# Patient Record
Sex: FEMALE | Race: BLACK OR AFRICAN AMERICAN | Employment: UNEMPLOYED | URBAN - METROPOLITAN AREA
[De-identification: names, ages, dates, MRNs, and addresses within clinical notes are randomized per-mention and may not be internally consistent; named-entity substitution may affect disease eponyms.]

---

## 2017-04-10 ENCOUNTER — APPOINTMENT (OUTPATIENT)
Dept: GENERAL RADIOLOGY | Age: 57
End: 2017-04-10
Attending: PHYSICIAN ASSISTANT
Payer: COMMERCIAL

## 2017-04-10 ENCOUNTER — HOSPITAL ENCOUNTER (EMERGENCY)
Age: 57
Discharge: HOME | End: 2017-04-10
Attending: EMERGENCY MEDICINE
Payer: COMMERCIAL

## 2017-04-10 VITALS
TEMPERATURE: 97.9 F | HEIGHT: 66 IN | DIASTOLIC BLOOD PRESSURE: 81 MMHG | HEART RATE: 85 BPM | WEIGHT: 190 LBS | SYSTOLIC BLOOD PRESSURE: 135 MMHG | OXYGEN SATURATION: 100 % | RESPIRATION RATE: 16 BRPM | BODY MASS INDEX: 30.53 KG/M2

## 2017-04-10 DIAGNOSIS — T25.221A BURN, FOOT, SECOND DEGREE, RIGHT, INITIAL ENCOUNTER: Primary | ICD-10-CM

## 2017-04-10 DIAGNOSIS — M25.561 ACUTE PAIN OF RIGHT KNEE: ICD-10-CM

## 2017-04-10 PROCEDURE — 99282 EMERGENCY DEPT VISIT SF MDM: CPT

## 2017-04-10 PROCEDURE — 75810000057 HC BURN CR DRS/DEB SM<5%TBSA

## 2017-04-10 PROCEDURE — 73562 X-RAY EXAM OF KNEE 3: CPT

## 2017-04-10 RX ORDER — IBUPROFEN 200 MG
200 TABLET ORAL
COMMUNITY
End: 2018-06-07

## 2017-04-10 RX ORDER — TRAMADOL HYDROCHLORIDE 50 MG/1
50 TABLET ORAL
Qty: 20 TAB | Refills: 0 | Status: SHIPPED | OUTPATIENT
Start: 2017-04-10 | End: 2018-06-07

## 2017-04-10 NOTE — DISCHARGE INSTRUCTIONS
Clean wound daily with antibacterial soap and water. Apply xeroform over the open area only. (Can add additional neosporin under the xeroform if dressings are sticking to the wound). Cover with gauze. Change dressing at least 2 - 3 times daily. Return to ER if wound becomes red, hot, high fevers occur, or significant drainage. Follow up with your PCP or in the ER weekly until healed. Burns: Care Instructions  Your Care Instructions    Thomas--even minor ones--can be very painful. A minor burn may heal within several days, while a more serious burn may take weeks or even months to heal completely. You may notice that the burned area feels tight and hard while it is healing. It is important to continue to move the area as the burn heals to prevent loss of motion or loss of function in the area. When your skin is damaged by a burn, you have a greater risk of infection. Keep the wound clean and change the bandages regularly to prevent infection and help the burn heal.  Burns can leave permanent scars. Taking good care of the burn as it heals may help prevent bad scars. The doctor has checked you carefully, but problems can develop later. If you notice any problems or new symptoms, get medical treatment right away. Follow-up care is a key part of your treatment and safety. Be sure to make and go to all appointments, and call your doctor if you are having problems. It's also a good idea to know your test results and keep a list of the medicines you take. How can you care for yourself at home? · If your doctor told you how to care for your burn, follow your doctor's instructions. If you did not get instructions, follow this general advice:  ¨ Wash the burn with clean water 2 times a day. Don't use hydrogen peroxide or alcohol, which can slow healing. ¨ Gently pat the burn dry after you wash it.   ¨ You may cover the burn with a thin layer of petroleum jelly, such as Vaseline, and a nonstick bandage. ¨ Apply more petroleum jelly and replace the bandage as needed. · Protect your burn while it is healing. Cover your burn if you are going out in the cold or the sun. ¨ Wear long sleeves if the burn is on your hands or arms. ¨ Wear a hat if the burn is on your face. ¨ Wear socks and shoes if the burn is on your feet. · Do not break blisters open. This increases the chance of infection. If a blister breaks open by itself, blot up the liquid, and leave the skin that covered the blister. This helps protect the new skin. · If your doctor prescribed antibiotics, take them as directed. Do not stop taking them just because you feel better. You need to take the full course of antibiotics. For pain and itching  · Take pain medicines exactly as directed. ¨ If the doctor gave you a prescription medicine for pain, take it as prescribed. ¨ If you are not taking a prescription pain medicine, ask your doctor if you can take an over-the-counter medicine. · If the burn itches, try not to scratch it. Try an over-the-counter antihistamine such as diphenhydramine (Benadryl) or loratadine (Claritin). Read and follow all instructions on the label. When should you call for help? Call your doctor now or seek immediate medical care if:  · Your pain gets worse. · You have symptoms of infection, such as:  ¨ Increased pain, swelling, warmth, or redness near the burn. ¨ Red streaks leading from the burn. ¨ Pus draining from the burn. ¨ A fever. Watch closely for changes in your health, and be sure to contact your doctor if:  · You do not get better as expected. Where can you learn more? Go to http://anais-stef.info/. Enter F098 in the search box to learn more about \"Burns: Care Instructions. \"  Current as of: May 27, 2016  Content Version: 11.2  © 2578-5202 [employer].  Care instructions adapted under license by [employer] (which disclaims liability or warranty for this information). If you have questions about a medical condition or this instruction, always ask your healthcare professional. [address] any warranty or liability for your use of this information. Joint Pain: Care Instructions  Your Care Instructions  Many people have small aches and pains from overuse or injury to muscles and joints. Joint injuries often happen during sports or recreation, work tasks, or projects around the home. An overuse injury can happen when you put too much stress on a joint or when you do an activity that stresses the joint over and over, such as using the computer or rowing a boat. You can take action at home to help your muscles and joints get better. You should feel better in 1 to 2 weeks, but it can take 3 months or more to heal completely. Follow-up care is a key part of your treatment and safety. Be sure to make and go to all appointments, and call your doctor if you are having problems. It's also a good idea to know your test results and keep a list of the medicines you take. How can you care for yourself at home? · Do not put weight on the injured joint for at least a day or two. · For the first day or two after an injury, do not take hot showers or baths, and do not use hot packs. The heat could make swelling worse. · Put ice or a cold pack on the sore joint for 10 to 20 minutes at a time. Try to do this every 1 to 2 hours for the next 3 days (when you are awake) or until the swelling goes down. Put a thin cloth between the ice and your skin. · Wrap the injury in an elastic bandage. Do not wrap it too tightly because this can cause more swelling. · Prop up the sore joint on a pillow when you ice it or anytime you sit or lie down during the next 3 days. Try to keep it above the level of your heart. This will help reduce swelling.   · Take an over-the-counter pain medicine, such as acetaminophen (Tylenol), ibuprofen (Advil, Motrin), or naproxen (Surya). Read and follow all instructions on the label. · After 1 or 2 days of rest, begin moving the joint gently. While the joint is still healing, you can begin to exercise using activities that do not strain or hurt the painful joint. When should you call for help? Call your doctor now or seek immediate medical care if:  · You have signs of infection, such as:  ¨ Increased pain, swelling, warmth, and redness. ¨ Red streaks leading from the joint. ¨ A fever. Watch closely for changes in your health, and be sure to contact your doctor if:  · Your movement or symptoms are not getting better after 1 to 2 weeks of home treatment. Where can you learn more? Go to http://anais-stef.info/. Enter P205 in the search box to learn more about \"Joint Pain: Care Instructions. \"  Current as of: May 23, 2016  Content Version: 11.2  © 1873-4600 [employer]. Care instructions adapted under license by [employer] (which disclaims liability or warranty for this information). If you have questions about a medical condition or this instruction, always ask your healthcare professional. [place] [address] any warranty or liability for your use of this information.
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
## 2017-04-10 NOTE — LETTER
NOTIFICATION OF RETURN TO WORK / SCHOOL 
 
4/10/2017 Ms. Demetrio Falcon [address] [place] [address] To Whom it may concern, Please excuse Demetrio Falcon from work 04/10/17 for medical reasons. She may return to work as of 4/11/17 but will need to keep her foot elevated at regular intervals throughout the day to prevent swelling. [facility]

## 2017-04-10 NOTE — ED TRIAGE NOTES
Roast pan fell on foot last night. C/o of right foot pain. Was able to ambulate into triage area without difficulty.

## 2018-03-07 ENCOUNTER — HOSPITAL ENCOUNTER (OUTPATIENT)
Dept: LAB | Age: 58
Discharge: HOME | End: 2018-03-07

## 2018-03-07 LAB — SENTARA SPECIMEN COL,SENBCF: NORMAL

## 2018-03-07 PROCEDURE — 99001 SPECIMEN HANDLING PT-LAB: CPT | Performed by: INTERNAL MEDICINE

## 2018-03-14 ENCOUNTER — OFFICE VISIT (OUTPATIENT)
Dept: ORTHOPEDIC SURGERY | Age: 58
End: 2018-03-14

## 2018-03-14 VITALS
SYSTOLIC BLOOD PRESSURE: 135 MMHG | HEIGHT: 66 IN | BODY MASS INDEX: 29.86 KG/M2 | TEMPERATURE: 98.1 F | WEIGHT: 185.8 LBS | DIASTOLIC BLOOD PRESSURE: 70 MMHG | RESPIRATION RATE: 18 BRPM | OXYGEN SATURATION: 100 % | HEART RATE: 95 BPM

## 2018-03-14 DIAGNOSIS — M25.561 CHRONIC PAIN OF BOTH KNEES: ICD-10-CM

## 2018-03-14 DIAGNOSIS — G89.29 CHRONIC PAIN OF BOTH KNEES: ICD-10-CM

## 2018-03-14 DIAGNOSIS — M25.562 CHRONIC PAIN OF BOTH KNEES: ICD-10-CM

## 2018-03-14 DIAGNOSIS — M17.0 PRIMARY OSTEOARTHRITIS OF BOTH KNEES: Primary | ICD-10-CM

## 2018-03-14 RX ORDER — CHOLECALCIFEROL (VITAMIN D3) 125 MCG
CAPSULE ORAL
COMMUNITY
End: 2018-06-07

## 2018-03-14 RX ORDER — TRIAMCINOLONE ACETONIDE 40 MG/ML
40 INJECTION, SUSPENSION INTRA-ARTICULAR; INTRAMUSCULAR ONCE
Qty: 1 ML | Refills: 0
Start: 2018-03-14 | End: 2018-03-14

## 2018-03-14 NOTE — PROGRESS NOTES
Elian Camarena  1960   Chief Complaint   Patient presents with    Knee Pain     Gurdeep        HISTORY OF PRESENT ILLNESS  Elian Camarena is a 62 y.o. female who presents today for evaluation of b/l knee pain. she rates her pain 7/10 today. Pain has been present for about two weeks. Patient describes the pain as aching that is Intermittent in nature. Symptoms are worse with going up and down stairs, prolonged walking and standing, Activity and is better with  Rest. Associated symptoms include stiffness, Swelling, grinding. Since problem started, it: has worsened. Pain does not wake patient up at night. Has taken no recent medications for the problem. H/o of arthroscopy about 5 years ago. 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 Has tried following treatments: Injections:YES; Brace:NO; Therapy:NO; Cane/Crutch:NO       No Known Allergies     Past Medical History:   Diagnosis Date    Arthritis       Social History     Social History    Marital status: UNKNOWN     Spouse name: N/A    Number of children: N/A    Years of education: N/A     Occupational History    Not on file. Social History Main Topics    Smoking status: Never Smoker    Smokeless tobacco: Never Used    Alcohol use No    Drug use: No    Sexual activity: Not on file     Other Topics Concern    Not on file     Social History Narrative      Past Surgical History:   Procedure Laterality Date    HX KNEE ARTHROSCOPY        History reviewed. No pertinent family history. Current Outpatient Prescriptions   Medication Sig    naproxen sodium (ALEVE) 220 mg cap Take  by mouth.  triamcinolone acetonide (KENALOG) 40 mg/mL injection 1 mL by IntraMUSCular route once for 1 dose.  ibuprofen (ADVIL) 200 mg tablet Take 200 mg by mouth DIALYSIS PRN for Pain. Indications: Pain    traMADol (ULTRAM) 50 mg tablet Take 1 Tab by mouth every six (6) hours as needed for Pain. Max Daily Amount: 200 mg.      No current facility-administered medications for this visit. REVIEW OF SYSTEM   Patient denies: Weight loss, Fever/Chills, HA, Visual changes, Fatigue, Chest pain, SOB, Abdominal pain, N/V/D/C, Blood in stool or urine, Edema. Pertinent positive as above in HPI. All others were negative    PHYSICAL EXAM:   Visit Vitals    /70    Pulse 95    Temp 98.1 °F (36.7 °C) (Oral)    Resp 18    Ht 5' 6\" (1.676 m)    Wt 185 lb 12.8 oz (84.3 kg)    SpO2 100%    BMI 29.99 kg/m2     The patient is a well-developed, well-nourished female   in no acute distress. The patient is alert and oriented times three. The patient is alert and oriented times three. Mood and affect are normal.  LYMPHATIC: lymph nodes are not enlarged and are within normal limits  SKIN: normal in color and non tender to palpation. There are no bruises or abrasions noted. NEUROLOGICAL: Motor sensory exam is within normal limits. Reflexes are equal bilaterally. There is normal sensation to pinprick and light touch  MUSCULOSKELETAL:  Examination Left knee Right knee   Skin Intact Intact   Range of motion 0-130 0-130   Effusion + +   Medial joint line tenderness + +   Lateral joint line tenderness - -   Tenderness Pes Bursa - -   Tenderness insertion MCL - -   Tenderness insertion LCL - -   Radhas - -   Patella crepitus + +   Patella grind - -   Lachman - -   Pivot shift - -   Anterior drawer - -   Posterior drawer - -   Varus stress - -   Valgus stress - -   Neurovascular Intact Intact   Calf Swelling and Tenderness to Palpation - -   Amy's Test - -   Hamstring Cord Tightness - -        PROCEDURE: After sterile prep, 4 cc of Xylocaine and 1 cc of Kenalog were injected into the bilateral  knees.        [facility]  OFFICE PROCEDURE PROGRESS NOTE        Chart reviewed for the following:  IJose MD, have reviewed the History, Physical and updated the Allergic reactions for [address] performed immediately prior to start of procedure:  Fly Wu MD, have performed the following reviews on Bakari Lopez prior to the start of the procedure:            * Patient was identified by name and date of birth   * Agreement on procedure being performed was verified  * Risks and Benefits explained to the patient  * Procedure site verified and marked as necessary  * Patient was positioned for comfort  * Consent was signed and verified     Time: 2:56 PM    Date of procedure: 3/14/2018    Procedure performed by:  Orestes Blum MD    Provider assisted by: (see medication administration)    How tolerated by patient: tolerated the procedure well with no complications    Comments: none      IMAGING: XR of the b/l knees dated 3/14/18 was reviewed and read: marked decreased side on the medial side b/l R>L and moderate patellofemoral joint syndrome      IMPRESSION:      ICD-10-CM ICD-9-CM    1. Primary osteoarthritis of both knees M17.0 715.16 TRIAMCINOLONE ACETONIDE INJ      triamcinolone acetonide (KENALOG) 40 mg/mL injection      DRAIN/INJECT LARGE JOINT/BURSA   2. Chronic pain of both knees M25.561 719.46 AMB POC XRAY, KNEE; 1/2 VIEWS    M25.562 338.29 AMB POC XRAY, KNEE; 1/2 VIEWS    G89.29          PLAN:  1. Patient's b/l knee pain is coming from XR documented degenerative changes. Provided with a note so that she can have a first floor apartment. Risk factors include: n/a  2. Yes cortisone injection indicated today B/L KNEES  3. No Physical/Occupational Therapy indicated today  4. No diagnostic test indicated today  5. No durable medical equipment indicated today  6. No referral indicated today   7. No medications indicated today  8.  No Narcotic indicated today      RTC 4 weeks if pain continues  Follow-up Disposition: Not on File    Scribed by Luis [address]) as dictated by Orestes Blum MD    I, Dr. Orestes Blum, confirm that all documentation is accurate.     Rody Casas M.D.   [facility]

## 2018-03-14 NOTE — LETTER
3/14/2018 3:06 PM 
 
Ms. Diana Martinez [address] [place] [address] To Whom It May Concern: 
 
Diana Martinez is currently under the care of [address] [facility] Dereje Aguilar. Due to orthopaedic conditions, please allow patient to live in a downstairs apartment. It is difficult for her to go up and down stairs. If there are questions or concerns please have the patient contact our office. Sincerely, Susan Murphy MD

## 2018-04-04 ENCOUNTER — OFFICE VISIT (OUTPATIENT)
Dept: ORTHOPEDIC SURGERY | Age: 58
End: 2018-04-04

## 2018-04-04 VITALS
TEMPERATURE: 96.4 F | DIASTOLIC BLOOD PRESSURE: 81 MMHG | SYSTOLIC BLOOD PRESSURE: 146 MMHG | HEIGHT: 66 IN | WEIGHT: 186.4 LBS | OXYGEN SATURATION: 100 % | HEART RATE: 67 BPM | RESPIRATION RATE: 18 BRPM | BODY MASS INDEX: 29.96 KG/M2

## 2018-04-04 DIAGNOSIS — M25.561 RIGHT KNEE PAIN, UNSPECIFIED CHRONICITY: Primary | ICD-10-CM

## 2018-04-04 DIAGNOSIS — M17.0 PRIMARY OSTEOARTHRITIS OF BOTH KNEES: ICD-10-CM

## 2018-04-04 DIAGNOSIS — S83.241A TEAR OF MEDIAL MENISCUS OF RIGHT KNEE, CURRENT, UNSPECIFIED TEAR TYPE, INITIAL ENCOUNTER: ICD-10-CM

## 2018-04-04 RX ORDER — ASPIRIN 81 MG/1
TABLET ORAL
Refills: 0 | COMMUNITY
Start: 2018-03-05 | End: 2018-06-07

## 2018-04-04 RX ORDER — ERGOCALCIFEROL 1.25 MG/1
CAPSULE ORAL
Refills: 0 | COMMUNITY
Start: 2018-03-05

## 2018-04-04 RX ORDER — PHENOL/SODIUM PHENOLATE
AEROSOL, SPRAY (ML) MUCOUS MEMBRANE
Refills: 0 | COMMUNITY
Start: 2018-03-05 | End: 2021-03-17

## 2018-04-04 RX ORDER — HYDROCHLOROTHIAZIDE 25 MG/1
TABLET ORAL
Refills: 0 | COMMUNITY
Start: 2018-03-05 | End: 2021-03-17

## 2018-04-04 RX ORDER — SIMVASTATIN 20 MG/1
TABLET, FILM COATED ORAL
Refills: 0 | COMMUNITY
Start: 2018-03-05 | End: 2019-10-09

## 2018-04-04 NOTE — PROGRESS NOTES
Peter Farah  1960   Chief Complaint   Patient presents with    Knee Pain     Gurdeep        HISTORY OF PRESENT ILLNESS  Peter Farah is a 62 y.o. female who presents today for reevaluation of bilateral knees. At last visit she had a cortisone injection in both knees. She states the left knee is doing much better with minimal discomfort. However the right knee did not have any relief from the injection. She has a sharp stabbing pain in her knee. She notes that in the last 2 months this pain has significantly worsened. Pain is a 7/10. Patient denies any fever, chills, chest pain, shortness of breath or calf pain. There are no new illness or injuries to report since last seen in the office. No changes in medications, allergies, social or family history. PHYSICAL EXAM:   Visit Vitals    /81    Pulse 67    Temp 96.4 °F (35.8 °C) (Oral)    Resp 18    Ht 5' 6\" (1.676 m)    Wt 186 lb 6.4 oz (84.6 kg)    SpO2 100%    BMI 30.09 kg/m2     The patient is a well-developed, well-nourished female   in no acute distress. The patient is alert and oriented times three. The patient is alert and oriented times three. Mood and affect are normal.  LYMPHATIC: lymph nodes are not enlarged and are within normal limits  SKIN: normal in color and non tender to palpation. There are no bruises or abrasions noted. NEUROLOGICAL: Motor sensory exam is within normal limits. Reflexes are equal bilaterally.  There is normal sensation to pinprick and light touch  MUSCULOSKELETAL:  Examination Left knee Right knee   Skin Intact Intact   Range of motion 0-120 0-120   Effusion - +   Medial joint line tenderness - +   Lateral joint line tenderness - -   Tenderness Pes Bursa - -   Tenderness insertion MCL - -   Tenderness insertion LCL - -   Radhas - +   Patella crepitus + +   Patella grind - -   Lachman - -   Pivot shift - -   Anterior drawer - -   Posterior drawer - -   Varus stress - -   Valgus stress - - Neurovascular Intact Intact   Calf Swelling and Tenderness to Palpation - -   Amy's Test - -   Hamstring Cord Tightness - -          IMPRESSION:      ICD-10-CM ICD-9-CM    1. Right knee pain, unspecified chronicity M25.561 719.46 MRI KNEE RT WO CONT   2. Primary osteoarthritis of both knees M17.0 715.16 PROCEDURE AUTHORIZATION TO    3. Tear of medial meniscus of right knee, current, unspecified tear type, initial encounter S83.241A 836.0         PLAN:   1. Pt with persistent right knee pain despite cortisone injection. Will order MRI r/o MMT. Discussed with patient that this may be her arthritis still. Will authorize Lorrine Quant for bilateral knees secondary to degenerative arthritis with joint space narrowing in medial compartment. May proceed with euflexxa depending on results of MRI  Risk factors include: BMI>30, HTN  2. No cortisone injection indicated today   3. No Physical/Occupational Therapy indicated today  4. Yes diagnostic test indicated today: MRI righ tknee  5. No durable medical equipment indicated today  6. No referral indicated today   7. No medications indicated today:   8.  No Narcotic indicated today       RTC following MRI    Follow-up Disposition: Not on [address] [facility], NELSON Wil[facility] [address] [facility]

## 2018-04-12 ENCOUNTER — HOSPITAL ENCOUNTER (OUTPATIENT)
Dept: MRI IMAGING | Age: 58
Discharge: HOME | End: 2018-04-12
Attending: ORTHOPAEDIC SURGERY
Payer: COMMERCIAL

## 2018-04-12 DIAGNOSIS — M25.561 RIGHT KNEE PAIN, UNSPECIFIED CHRONICITY: ICD-10-CM

## 2018-04-12 PROCEDURE — 73721 MRI JNT OF LWR EXTRE W/O DYE: CPT

## 2018-04-24 ENCOUNTER — OFFICE VISIT (OUTPATIENT)
Dept: ORTHOPEDIC SURGERY | Age: 58
End: 2018-04-24

## 2018-04-24 VITALS
OXYGEN SATURATION: 100 % | TEMPERATURE: 97.2 F | SYSTOLIC BLOOD PRESSURE: 137 MMHG | RESPIRATION RATE: 18 BRPM | HEART RATE: 68 BPM | HEIGHT: 66 IN | WEIGHT: 181.4 LBS | BODY MASS INDEX: 29.15 KG/M2 | DIASTOLIC BLOOD PRESSURE: 76 MMHG

## 2018-04-24 DIAGNOSIS — M17.11 PRIMARY OSTEOARTHRITIS OF RIGHT KNEE: Primary | ICD-10-CM

## 2018-04-24 RX ORDER — HYALURONATE SODIUM 10 MG/ML
2 SYRINGE (ML) INTRAARTICULAR ONCE
Qty: 2 ML | Refills: 0
Start: 2018-04-24 | End: 2018-04-24

## 2018-04-24 NOTE — PROGRESS NOTES
Janeth Somers  1960   Chief Complaint   Patient presents with    Knee Pain     Right        HISTORY OF PRESENT ILLNESS  Janeth Somers is a 62 y.o. female who presents today for reevaluation of bilateral knees and to review MRI results. Patient rates pain as 6/10 today. She continues to have pain with prolonged walking and standing. She had limited relief from previous cortisone injection in the right knee. Notes a sharp, stabbing pain. Patient denies any fever, chills, chest pain, shortness of breath or calf pain. There are no new illness or injuries to report since last seen in the office. No changes in medications, allergies, social or family history. PHYSICAL EXAM:   Visit Vitals    /76    Pulse 68    Temp 97.2 °F (36.2 °C) (Oral)    Resp 18    Ht 5' 6\" (1.676 m)    Wt 181 lb 6.4 oz (82.3 kg)    SpO2 100%    BMI 29.28 kg/m2     The patient is a well-developed, well-nourished female   in no acute distress. The patient is alert and oriented times three. The patient is alert and oriented times three. Mood and affect are normal.  LYMPHATIC: lymph nodes are not enlarged and are within normal limits  SKIN: normal in color and non tender to palpation. There are no bruises or abrasions noted. NEUROLOGICAL: Motor sensory exam is within normal limits. Reflexes are equal bilaterally.  There is normal sensation to pinprick and light touch  MUSCULOSKELETAL:  Examination Left knee Right knee   Skin Intact Intact   Range of motion 0-120 0-120   Effusion - +   Medial joint line tenderness - +   Lateral joint line tenderness - -   Tenderness Pes Bursa - -   Tenderness insertion MCL - -   Tenderness insertion LCL - -   Radhas - +   Patella crepitus + +   Patella grind - -   Lachman - -   Pivot shift - -   Anterior drawer - -   Posterior drawer - -   Varus stress - -   Valgus stress - -   Neurovascular Intact Intact   Calf Swelling and Tenderness to Palpation - -   Amy's Test - -   Hamstring Cord Tightness - -     PROCEDURE: After sterile prep, under ultrasound guidance 2 cc of Euflexxa were injected into the right knee. [address] [facility]  OFFICE PROCEDURE PROGRESS NOTE        Chart reviewed for the following:  Natacha Stone MD, have reviewed the History, Physical and updated the Allergic reactions for [address] performed immediately prior to start of procedure:  Natacha Stone MD, have performed the following reviews on Billy Rumble prior to the start of the procedure:            * Patient was identified by name and date of birth   * Agreement on procedure being performed was verified  * Risks and Benefits explained to the patient  * Procedure site verified and marked as necessary  * Patient was positioned for comfort  * Consent was signed and verified     Time: 1:39 PM    Date of procedure: 4/24/2018    Procedure performed by:  Hector Richards MD    Provider assisted by: (see medication administration)    How tolerated by patient: tolerated the procedure well with no complications    Comments: none      IMAGING: MRI of the right knee dated 4/12/18 was reviewed and read:   IMPRESSION:  1. Patellofemoral space findings similar to prior, suggestive of maltracking  issues. Localized cartilage loss and marrow edema likely from repetitive  impaction. Mild impingement edema of the soft tissues. 2. Full-thickness cartilage loss in the medial compartment, progressed from  prior. 3. Postsurgical blunting versus tearing involving the posterior horn of the  medial meniscus near its root. There is no rupture.  -Also likely minor undersurface fibrillation within the body of the medial  meniscus. IMPRESSION:      ICD-10-CM ICD-9-CM    1.  Primary osteoarthritis of right knee M17.11 715.16 EUFLEXXA INJECTION PER DOSE      sodium hyaluronate (SUPARTZ FX/HYALGAN/GENIVSC) 10 mg/mL syrg injection      US GUIDE INJ/ASP/ARTHRO LG JNT/BURSA          PLAN:   1. I discussed the results of the MRI and the treatment options with the patient. Patient has authorization for Euflexxa in the right knee. We will begin the injections today. Risk factors include: BMI>30, HTN  2. No cortisone injection indicated today  3. No Physical/Occupational Therapy indicated today  4. No diagnostic test indicated today:   5. No durable medical equipment indicated today  6. No referral indicated today   7. No medications indicated today:   8. No Narcotic indicated today       RTC 1 week for Euflexxa #2    Follow-up Disposition: Not on File    Scribed by Rochelle Macdonald [address] [facility] [address]) as dictated by Yessica Enriquez MD    I, Dr. Yessica Enriquez, confirm that all documentation is accurate.     Yessica Enriquez M.D.   [facility] [address] and Spine Specialist

## 2018-05-01 ENCOUNTER — OFFICE VISIT (OUTPATIENT)
Dept: ORTHOPEDIC SURGERY | Age: 58
End: 2018-05-01

## 2018-05-01 ENCOUNTER — HOSPITAL ENCOUNTER (OUTPATIENT)
Dept: MAMMOGRAPHY | Age: 58
Discharge: HOME | End: 2018-05-01
Attending: INTERNAL MEDICINE
Payer: COMMERCIAL

## 2018-05-01 VITALS
OXYGEN SATURATION: 100 % | WEIGHT: 184.2 LBS | SYSTOLIC BLOOD PRESSURE: 145 MMHG | DIASTOLIC BLOOD PRESSURE: 77 MMHG | HEART RATE: 62 BPM | TEMPERATURE: 97.2 F | HEIGHT: 66 IN | BODY MASS INDEX: 29.6 KG/M2 | RESPIRATION RATE: 18 BRPM

## 2018-05-01 DIAGNOSIS — M17.11 PRIMARY OSTEOARTHRITIS OF RIGHT KNEE: Primary | ICD-10-CM

## 2018-05-01 DIAGNOSIS — Z12.31 VISIT FOR SCREENING MAMMOGRAM: ICD-10-CM

## 2018-05-01 PROCEDURE — 77067 SCR MAMMO BI INCL CAD: CPT

## 2018-05-01 RX ORDER — HYALURONATE SODIUM 10 MG/ML
2 SYRINGE (ML) INTRAARTICULAR ONCE
Qty: 2 ML | Refills: 0
Start: 2018-05-01 | End: 2018-05-01

## 2018-05-01 NOTE — PROGRESS NOTES
Patient: Erlin Baig                MRN: [id number]       SSN: [id number]  YOB: 1960        AGE: 62 y.o. SEX: female  Body mass index is 29.73 kg/(m^2). PCP: Hamida Zhao MD  05/01/18    Chief Complaint   Patient presents with    Knee Pain     Right       HISTORY:  Erlin Baig is a 62 y.o. female who is seen for reevaluation of Right knee and here for 2nd injection of euflexxa. PROCEDURE:  Patient's Right knee, after timeout under sterile conditions, was injected with 2 cc of Euflexxa. [address]  OFFICE PROCEDURE PROGRESS NOTE        Chart reviewed for the following:   I, Luz Saint, PA-C, have reviewed the History, Physical and updated the Allergic reactions for [address] performed immediately prior to start of procedure:   I, Luz Saint, PA-C, have performed the following reviews on Erlin Baig prior to the start of the procedure:            * Patient was identified by name and date of birth   * Agreement on procedure being performed was verified  * Risks and Benefits explained to the patient  * Procedure site verified and marked as necessary  * Patient was positioned for comfort  * Consent was signed and verified     Time: 8:33 AM    Date of procedure: 5/1/2018    Procedure performed by:  Luz Saint, PA-C    Provider assisted by: None     How tolerated by patient: tolerated the procedure well with no complications    Comments: none    IMPRESSION:     ICD-10-CM ICD-9-CM    1. Primary osteoarthritis of right knee M17.11 715.16 ME DRAIN/INJECT LARGE JOINT/BURSA      EUFLEXXA INJECTION PER DOSE      sodium hyaluronate (SUPARTZ FX/HYALGAN/GENIVSC) 10 mg/mL syrg injection        PLAN:  Ms. Treasure Scott will return in one week for her third Euflexxa injection.       Scribed by Jade Magnet ([address]) as dictated by Luz Saint, PA-C Luz Saint, [address] and Spine Specialist

## 2018-05-08 ENCOUNTER — OFFICE VISIT (OUTPATIENT)
Dept: ORTHOPEDIC SURGERY | Age: 58
End: 2018-05-08

## 2018-05-08 VITALS
OXYGEN SATURATION: 99 % | RESPIRATION RATE: 16 BRPM | SYSTOLIC BLOOD PRESSURE: 146 MMHG | BODY MASS INDEX: 29.83 KG/M2 | TEMPERATURE: 97.3 F | HEART RATE: 70 BPM | WEIGHT: 185.6 LBS | HEIGHT: 66 IN | DIASTOLIC BLOOD PRESSURE: 82 MMHG

## 2018-05-08 DIAGNOSIS — M17.11 PRIMARY OSTEOARTHRITIS OF RIGHT KNEE: Primary | ICD-10-CM

## 2018-05-08 RX ORDER — HYALURONATE SODIUM 10 MG/ML
2 SYRINGE (ML) INTRAARTICULAR ONCE
Qty: 2 ML | Refills: 0
Start: 2018-05-08 | End: 2018-05-08

## 2018-05-08 NOTE — PROGRESS NOTES
Patient: Debbie Andres                MRN: [id number]       SSN: [id number]  YOB: 1960        AGE: 62 y.o. SEX: female  Body mass index is 29.96 kg/(m^2). PCP: Lauren Rachel MD  05/08/18    Chief Complaint   Patient presents with    Knee Pain     R KNEE PAIN        HISTORY:  Debbie Andres is a 62 y.o. female who is seen for reevaluation of Right knee and here for 3rd and final injection of Euflexxa. PROCEDURE:  Under ultrasound guidance, patient's Right knee, after timeout under sterile conditions, was injected with 2 cc of Euflexxa. [facility]  OFFICE PROCEDURE PROGRESS NOTE        Chart reviewed for the following:   Shubham De Los Santos MD, have reviewed the History, Physical and updated the Allergic reactions for [address] performed immediately prior to start of procedure:   Shubham De Los Santos MD, have performed the following reviews on Debbie Andres prior to the start of the procedure:            * Patient was identified by name and date of birth   * Agreement on procedure being performed was verified  * Risks and Benefits explained to the patient  * Procedure site verified and marked as necessary  * Patient was positioned for comfort  * Consent was signed and verified     Time: 8:33 AM       Date of procedure: 5/8/2018    Procedure performed by:  Jay Biggs MD    Provider assisted by: None     How tolerated by patient: tolerated the procedure well with no complications    Comments: none    IMPRESSION:     ICD-10-CM ICD-9-CM    1. Primary osteoarthritis of right knee M17.11 715.16 EUFLEXXA INJECTION PER DOSE      sodium hyaluronate (SUPARTZ FX/HYALGAN/GENIVSC) 10 mg/mL syrg injection      US GUIDE INJ/ASP/ARTHRO LG JNT/BURSA        PLAN: Ms. Neal Gates has completed her Euflexxa injection series. she will return as needed.       Scribed by Eduardo Costello ([facility]) as dictated by Mikayla Rod Yan Callahan MD    I, Dr. Lauro Back, confirm that all documentation is accurate.     Lauro Back M.D.   [facility] Specialist

## 2018-05-15 ENCOUNTER — OFFICE VISIT (OUTPATIENT)
Dept: ORTHOPEDIC SURGERY | Age: 58
End: 2018-05-15

## 2018-05-15 VITALS
TEMPERATURE: 97.3 F | DIASTOLIC BLOOD PRESSURE: 82 MMHG | RESPIRATION RATE: 16 BRPM | SYSTOLIC BLOOD PRESSURE: 134 MMHG | HEART RATE: 79 BPM | WEIGHT: 187.6 LBS | OXYGEN SATURATION: 100 % | BODY MASS INDEX: 30.15 KG/M2 | HEIGHT: 66 IN

## 2018-05-15 DIAGNOSIS — M17.11 PRIMARY OSTEOARTHRITIS OF RIGHT KNEE: Primary | ICD-10-CM

## 2018-05-15 RX ORDER — METHYLPREDNISOLONE 4 MG/1
TABLET ORAL
Qty: 21 DOSE PACK | Refills: 0 | Status: SHIPPED | OUTPATIENT
Start: 2018-05-15 | End: 2018-06-07

## 2018-05-15 NOTE — PROGRESS NOTES
Gilberto Crockett  1960   Chief Complaint   Patient presents with    Knee Pain     R KNEE PAIN         HISTORY OF PRESENT ILLNESS  Gilberto Crockett is a 62 y.o. female who presents today for reevaluation of right knee pain. Patient rates pain as 6/10 today. At last OV, patient completed a series of Euflexxa injections, about one week ago. The pain in the right knee persists. She continues to have pain with prolonged walking and standing. Has tried previous cortisone injection in the right knee with limited relief. Patient denies any fever, chills, chest pain, shortness of breath or calf pain. There are no new illness or injuries to report since last seen in the office. No changes in medications, allergies, social or family history. PHYSICAL EXAM:   Visit Vitals    /82    Pulse 79    Temp 97.3 °F (36.3 °C) (Oral)    Resp 16    Ht 5' 6\" (1.676 m)    Wt 187 lb 9.6 oz (85.1 kg)    SpO2 100%    BMI 30.28 kg/m2     The patient is a well-developed, well-nourished female   in no acute distress. The patient is alert and oriented times three. The patient is alert and oriented times three. Mood and affect are normal.  LYMPHATIC: lymph nodes are not enlarged and are within normal limits  SKIN: normal in color and non tender to palpation. There are no bruises or abrasions noted. NEUROLOGICAL: Motor sensory exam is within normal limits. Reflexes are equal bilaterally.  There is normal sensation to pinprick and light touch  MUSCULOSKELETAL:  Examination Left knee Right knee   Skin Intact Intact   Range of motion 0-120 0-120   Effusion - +   Medial joint line tenderness - +   Lateral joint line tenderness - -   Tenderness Pes Bursa - -   Tenderness insertion MCL - -   Tenderness insertion LCL - -   Radhas - +   Patella crepitus + +   Patella grind - -   Lachman - -   Pivot shift - -   Anterior drawer - -   Posterior drawer - -   Varus stress - -   Valgus stress - -   Neurovascular Intact Intact   Calf Swelling and Tenderness to Palpation - -   Amy's Test - -   Hamstring Cord Tightness - -       IMAGING: MRI of the right knee dated 4/12/18 was reviewed and read:   IMPRESSION:  1. Patellofemoral space findings similar to prior, suggestive of maltracking  issues. Localized cartilage loss and marrow edema likely from repetitive  impaction. Mild impingement edema of the soft tissues. 2. Full-thickness cartilage loss in the medial compartment, progressed from  prior. 3. Postsurgical blunting versus tearing involving the posterior horn of the  medial meniscus near its root. There is no rupture.  -Also likely minor undersurface fibrillation within the body of the medial  meniscus. IMPRESSION:      ICD-10-CM ICD-9-CM    1. Primary osteoarthritis of right knee M17.11 715.16 methylPREDNISolone (MEDROL DOSEPACK) 4 mg tablet          PLAN:   1. Patient has persistent pain despite previous cortisone and Euflexxa series. Discussed knee replacement today. Risk factors include: BMI>30, HTN  2. No cortisone injection indicated today  3. No Physical/Occupational Therapy indicated today  4. No diagnostic test indicated today:   5. No durable medical equipment indicated today  6. Yes referral indicated today [facility]  7. Yes medications indicated today: MOBIC  8. No Narcotic indicated today       RTC prn    Follow-up Disposition: Not on File    Scribed by Carolina [facility]) as dictated by Corby Hawkins MD    I, Dr. Corby Hawkins, confirm that all documentation is accurate.     Corby Hawkins M.D.   [facility] and Spine Specialist

## 2018-05-21 ENCOUNTER — OFFICE VISIT (OUTPATIENT)
Dept: ORTHOPEDIC SURGERY | Age: 58
End: 2018-05-21

## 2018-05-21 VITALS
TEMPERATURE: 98.6 F | WEIGHT: 186.6 LBS | BODY MASS INDEX: 29.99 KG/M2 | SYSTOLIC BLOOD PRESSURE: 147 MMHG | HEART RATE: 83 BPM | OXYGEN SATURATION: 99 % | HEIGHT: 66 IN | DIASTOLIC BLOOD PRESSURE: 90 MMHG | RESPIRATION RATE: 16 BRPM

## 2018-05-21 DIAGNOSIS — M17.11 ARTHRITIS OF KNEE, RIGHT: ICD-10-CM

## 2018-05-21 DIAGNOSIS — M25.561 RIGHT KNEE PAIN, UNSPECIFIED CHRONICITY: Primary | ICD-10-CM

## 2018-05-21 NOTE — PROGRESS NOTES
HISTORY OF PRESENT ILLNESS: Jorge Luis Schwarz is a 51-year-old female who is here for consultation regarding right knee pain. She has had progressive pain despite extensive conservative treatment. She points to diffuse pain in the right knee, particularly in the weightbearing area. Her pain is aggravated with weightbearing activities. She has not utilized any ambulatory assist. She has tried multiple arthritis medicines, including Tylenol Arthritis, as well as other anti-inflammatory medication without relief of her pain. She had a right arthroscope a couple years ago, which helped her a little bit but only for a short period of time. She has now had progressive pain despite three Cortisone shots, as well as three gel injections. She has noticed a slight deformity of the right knee. She has not worn a routine brace on the right knee. Her pain is limiting her daily activities. PHYSICAL EXAMINATION:  Clinical examination today reveals a 51-year-old, slightly overweight, -American female in discomfort. She does ambulate with an antalgic gait with decreased stance phase on the right leg. With reference to her right knee, she had slight varus deformity of the right knee which is not quite correctable to the neutral position passively. She has palpable medial and lateral joint line tenderness. She has crepitus in the patellofemoral area, as well as the medial compartment of the right knee with flexion and extension. She has good collateral, as well as cruciate ligament stability of the right knee. Lachman test is negative. Anterior and posterior drawer tests are negative. Radhas test results in pain along the medial joint line but no palpable click. She has no right calf tenderness or swelling. Neurovascular testing is intact to the right lower extremity. She has good range of motion of the right knee from almost full extension and flexion to about 125? Sarthak Saunas      RADIOGRAPHS: Her previous MRI scan is reviewed and reveals significant arthritis, particularly involving the medial compartment with one area of exposed bone. In addition, her current x-rays today reveal fairly significant arthritis. She has almost bone-on-bone opposition of the medial compartment of the right knee on standing view radiographs. There is a radiographic genu varus deformity. She has osteophytes in the medial femoral condyle, as well as the medial tibial plateau and increased sclerosis in the medial compartment. IMPRESSION: A 80-year-old female with progressive osteoarthritis of the right knee with genu varus deformity. RECOMMENDATIONS:  This patients pain has not been responsive to conservative treatment as outlined above. Clinically and radiographically, she is a candidate for knee arthroplasty surgery. I discussed with the patient partial versus total knee arthroplasty, but she has some lateral subluxation, and I am concerned about the intercondylar notch area. Risks and benefits of total knee arthroplasty surgery were discussed with the patient preoperatively and include but are not limited to infection, bleeding, nerve damage, deep venous thrombosis, pulmonary emboli, failure of the prosthesis, as well failure of the procedure. We discussed hospitalization and rehabilitation postoperatively. The patient understands. She would like to proceed with surgery pending appropriate preoperative clearances. All of her questions were answered today. Vitals:    05/21/18 1531   BP: 147/90   Pulse: 83   Resp: 16   Temp: 98.6 °F (37 °C)   TempSrc: Oral   SpO2: 99%   Weight: 186 lb 9.6 oz (84.6 kg)   Height: 5' 6\" (1.676 m)       There is no problem list on file for this patient. There are no active problems to display for this patient.     Current Outpatient Prescriptions   Medication Sig Dispense Refill    methylPREDNISolone (MEDROL DOSEPACK) 4 mg tablet Per dose pack instructions 21 Dose Pack 0    aspirin delayed-release 81 mg tablet take 1 tablet by mouth once daily  0    VITAMIN D2 50,000 unit capsule take 1 capsule by mouth every week  0    hydroCHLOROthiazide (HYDRODIURIL) 25 mg tablet take 1 tablet by mouth once daily  0    Omeprazole delayed release (PRILOSEC D/R) 20 mg tablet take 1 tablet by mouth once daily  0    simvastatin (ZOCOR) 20 mg tablet take 1 tablet by mouth at bedtime  0    naproxen sodium (ALEVE) 220 mg cap Take  by mouth.  ibuprofen (ADVIL) 200 mg tablet Take 200 mg by mouth DIALYSIS PRN for Pain. Indications: Pain      traMADol (ULTRAM) 50 mg tablet Take 1 Tab by mouth every six (6) hours as needed for Pain. Max Daily Amount: 200 mg. 20 Tab 0     No Known Allergies  Past Medical History:   Diagnosis Date    Arthritis      Past Surgical History:   Procedure Laterality Date    HX KNEE ARTHROSCOPY       History reviewed. No pertinent family history.   Social History   Substance Use Topics    Smoking status: Never Smoker    Smokeless tobacco: Never Used    Alcohol use No

## 2018-05-25 DIAGNOSIS — Z01.818 PREOP EXAMINATION: Primary | ICD-10-CM

## 2018-05-30 ENCOUNTER — HOSPITAL ENCOUNTER (OUTPATIENT)
Dept: LAB | Age: 58
Discharge: HOME | End: 2018-05-30

## 2018-05-30 ENCOUNTER — HOSPITAL ENCOUNTER (OUTPATIENT)
Dept: GENERAL RADIOLOGY | Age: 58
Discharge: HOME | End: 2018-05-30
Payer: COMMERCIAL

## 2018-05-30 ENCOUNTER — HOSPITAL ENCOUNTER (OUTPATIENT)
Dept: PREADMISSION TESTING | Age: 58
Discharge: HOME | End: 2018-05-30
Payer: COMMERCIAL

## 2018-05-30 DIAGNOSIS — Z01.818 PREOP EXAMINATION: ICD-10-CM

## 2018-05-30 LAB
ABO + RH BLD: NORMAL
ATRIAL RATE: 70 BPM
BLOOD GROUP ANTIBODIES SERPL: NORMAL
CALCULATED P AXIS, ECG09: 38 DEGREES
CALCULATED R AXIS, ECG10: -27 DEGREES
CALCULATED T AXIS, ECG11: 49 DEGREES
DIAGNOSIS, 93000: NORMAL
P-R INTERVAL, ECG05: 140 MS
Q-T INTERVAL, ECG07: 414 MS
QRS DURATION, ECG06: 86 MS
QTC CALCULATION (BEZET), ECG08: 447 MS
SENTARA SPECIMEN COL,SENBCF: NORMAL
SPECIMEN EXP DATE BLD: NORMAL
VENTRICULAR RATE, ECG03: 70 BPM

## 2018-05-30 PROCEDURE — 86900 BLOOD TYPING SEROLOGIC ABO: CPT | Performed by: ORTHOPAEDIC SURGERY

## 2018-05-30 PROCEDURE — 93005 ELECTROCARDIOGRAM TRACING: CPT

## 2018-05-30 PROCEDURE — 71046 X-RAY EXAM CHEST 2 VIEWS: CPT

## 2018-05-30 PROCEDURE — 99001 SPECIMEN HANDLING PT-LAB: CPT | Performed by: ORTHOPAEDIC SURGERY

## 2018-05-30 NOTE — PROGRESS NOTES
Hannah Vera attended the Joint Replacement Pre-Operative class on 5/30/18. Topics discussed included surgery preparation, what to expect the day of surgery, medications, physical and occupational therapy, and discharge planning. It was discussed that this is considered an elective surgery and that prior to the surgery they need to make decisions such as arranging for help at home once they are discharged. She was given a knee patient education notebook to take home. Opportunity was given to ask questions and phone number of the Orthopaedic Nurse Clinician was given for any questions or concerns that may arise later.

## 2018-05-31 ENCOUNTER — OFFICE VISIT (OUTPATIENT)
Dept: ORTHOPEDIC SURGERY | Age: 58
End: 2018-05-31

## 2018-05-31 ENCOUNTER — DOCUMENTATION ONLY (OUTPATIENT)
Dept: ORTHOPEDIC SURGERY | Age: 58
End: 2018-05-31

## 2018-05-31 VITALS
OXYGEN SATURATION: 99 % | SYSTOLIC BLOOD PRESSURE: 147 MMHG | BODY MASS INDEX: 29.89 KG/M2 | DIASTOLIC BLOOD PRESSURE: 76 MMHG | HEIGHT: 66 IN | WEIGHT: 186 LBS | RESPIRATION RATE: 16 BRPM | HEART RATE: 78 BPM | TEMPERATURE: 98.6 F

## 2018-05-31 DIAGNOSIS — Z01.818 PRE-OP EXAM: ICD-10-CM

## 2018-05-31 DIAGNOSIS — M17.11 PRIMARY OSTEOARTHRITIS OF RIGHT KNEE: Primary | ICD-10-CM

## 2018-05-31 LAB
A-G RATIO,AGRAT: 1.8 RATIO (ref 1.1–2.6)
ABSOLUTE LYMPHOCYTE COUNT, 10803: 2.6 K/UL (ref 1–4.8)
ALBUMIN SERPL-MCNC: 4.8 G/DL (ref 3.5–5)
ALP SERPL-CCNC: 81 U/L (ref 25–115)
ALT SERPL-CCNC: 18 U/L (ref 5–40)
ANION GAP SERPL CALC-SCNC: 14 MMOL/L
APTT PPP: 26 SEC (ref 22–36)
AST SERPL W P-5'-P-CCNC: 11 U/L (ref 10–37)
AVG GLU, 10930: 133 MG/DL (ref 91–123)
BASOPHILS # BLD: 0 K/UL (ref 0–0.2)
BASOPHILS NFR BLD: 0 % (ref 0–2)
BILIRUB SERPL-MCNC: 0.6 MG/DL (ref 0.2–1.2)
BILIRUB UR QL: NEGATIVE
BUN SERPL-MCNC: 10 MG/DL (ref 6–22)
CALCIUM SERPL-MCNC: 9.5 MG/DL (ref 8.4–10.5)
CHLORIDE SERPL-SCNC: 101 MMOL/L (ref 98–110)
CO2 SERPL-SCNC: 29 MMOL/L (ref 20–32)
CREAT SERPL-MCNC: 0.7 MG/DL (ref 0.5–1.2)
EOSINOPHIL # BLD: 0.1 K/UL (ref 0–0.5)
EOSINOPHIL NFR BLD: 1 % (ref 0–6)
EPITHELIAL,EPSU: ABNORMAL /HPF (ref 0–2)
ERYTHROCYTE [DISTWIDTH] IN BLOOD BY AUTOMATED COUNT: 14.2 % (ref 10–15.5)
GFRAA, 66117: >60
GFRNA, 66118: >60
GLOBULIN,GLOB: 2.7 G/DL (ref 2–4)
GLUCOSE SERPL-MCNC: 92 MG/DL (ref 70–99)
GLUCOSE UR QL: NEGATIVE MG/DL
GRANULOCYTES,GRANS: 62 % (ref 40–75)
HBA1C MFR BLD HPLC: 6.2 % (ref 4.8–5.9)
HCT VFR BLD AUTO: 43.5 % (ref 35.1–48)
HGB BLD-MCNC: 14.1 G/DL (ref 11.7–16)
HGB UR QL STRIP: NEGATIVE
INR PPP: 1 (ref 0.89–1.29)
KETONES UR QL STRIP.AUTO: NEGATIVE MG/DL
LEUKOCYTE ESTERASE: NEGATIVE
LYMPHOCYTES, LYMLT: 28 % (ref 20–45)
MCH RBC QN AUTO: 28 PG (ref 26–34)
MCHC RBC AUTO-ENTMCNC: 32 G/DL (ref 31–36)
MCV RBC AUTO: 88 FL (ref 80–95)
MONOCYTES # BLD: 0.7 K/UL (ref 0.1–1)
MONOCYTES NFR BLD: 7 % (ref 3–12)
NEUTROPHILS # BLD AUTO: 5.9 K/UL (ref 1.8–7.7)
NITRITE UR QL STRIP.AUTO: NEGATIVE
PH UR STRIP: 6 PH (ref 5–8)
PLATELET # BLD AUTO: 360 K/UL (ref 140–440)
PMV BLD AUTO: 10 FL (ref 9–13)
POTASSIUM SERPL-SCNC: 3.8 MMOL/L (ref 3.5–5.5)
PROT SERPL-MCNC: 7.5 G/DL (ref 6.4–8.3)
PROT UR QL STRIP: NEGATIVE MG/DL
PROTHROMBIN TIME: 10.4 SEC (ref 9–13)
RBC # BLD AUTO: 4.96 M/UL (ref 3.8–5.2)
RBC #/AREA URNS HPF: ABNORMAL /HPF
RESULT: NORMAL
SODIUM SERPL-SCNC: 144 MMOL/L (ref 133–145)
SP GR UR: 1.02 (ref 1–1.03)
UROBILINOGEN UR STRIP-MCNC: <2 MG/DL
WBC # BLD AUTO: 9.4 K/UL (ref 4–11)
WBC URNS QL MICRO: ABNORMAL /HPF (ref 0–2)

## 2018-05-31 RX ORDER — OXYCODONE AND ACETAMINOPHEN 5; 325 MG/1; MG/1
1 TABLET ORAL ONCE
Status: CANCELLED | OUTPATIENT
Start: 2018-05-31 | End: 2018-06-01

## 2018-05-31 NOTE — PROGRESS NOTES
History and Physical Performed today and documented in chart    Clint Estrada [place]  5/31/2018.  3:08 PM

## 2018-05-31 NOTE — H&P
[address] [place] [address]  [phone]           HISTORY & PHYSICAL      Patient: Timothy Earl                MRN: [id number]       SSN: [id number]  YOB: 1960        AGE: 62 y.o. SEX: female  Body mass index is 30.02 kg/(m^2). PCP: Werner Prince MD  05/31/18      CC: Right  knee end stage OA  Problem List Items Addressed This Visit     None      Visit Diagnoses     Primary osteoarthritis of right knee    -  Primary    Pre-op exam                HPI:  The patient is a pleasant 62 y.o. whom has end stage OA of their Right knee and has failed conservative treatment including but not limited to NSAIDS, cortisone injections, viscosupplementation, PT, and pain medicine. Due to the current findings and affected activities of daily living, surgical intervention is indicated. The alternatives, risks, complications, as well as expected outcome were discussed. These include but are not limited to infection, blood loss, need for blood transfusion, neurovascular damage, DVT, PE,  post-op stiffness and pain, leg length discrepancy, dislocation, anesthetic complications, prothesis longevity, need for more surgery, MI, stroke, and even death. The patient understands and wishes to proceed with surgery.       Past Medical History:   Diagnosis Date    Arthritis          Current Outpatient Prescriptions:     methylPREDNISolone (MEDROL DOSEPACK) 4 mg tablet, Per dose pack instructions, Disp: 21 Dose Pack, Rfl: 0    aspirin delayed-release 81 mg tablet, take 1 tablet by mouth once daily, Disp: , Rfl: 0    VITAMIN D2 50,000 unit capsule, take 1 capsule by mouth every week, Disp: , Rfl: 0    hydroCHLOROthiazide (HYDRODIURIL) 25 mg tablet, take 1 tablet by mouth once daily, Disp: , Rfl: 0    Omeprazole delayed release (PRILOSEC D/R) 20 mg tablet, take 1 tablet by mouth once daily, Disp: , Rfl: 0    simvastatin (ZOCOR) 20 mg tablet, take 1 tablet by mouth at bedtime, Disp: , Rfl: 0    naproxen sodium (ALEVE) 220 mg cap, Take  by mouth., Disp: , Rfl:     ibuprofen (ADVIL) 200 mg tablet, Take 200 mg by mouth DIALYSIS PRN for Pain. Indications: Pain, Disp: , Rfl:     traMADol (ULTRAM) 50 mg tablet, Take 1 Tab by mouth every six (6) hours as needed for Pain. Max Daily Amount: 200 mg., Disp: 20 Tab, Rfl: 0    No Known Allergies    Social History     Social History    Marital status: UNKNOWN     Spouse name: N/A    Number of children: N/A    Years of education: N/A     Occupational History    Not on file. Social History Main Topics    Smoking status: Never Smoker    Smokeless tobacco: Never Used    Alcohol use No    Drug use: No    Sexual activity: Not on file     Other Topics Concern    Not on file     Social History Narrative       Past Surgical History:   Procedure Laterality Date    HX KNEE ARTHROSCOPY         Family History:  Non-contributory. PE:  Visit Vitals    /76    Pulse 78    Temp 98.6 °F (37 °C) (Oral)    Resp 16    Ht 5' 6\" (1.676 m)    Wt 186 lb (84.4 kg)    SpO2 99%    BMI 30.02 kg/m2     A&O X3, NAD, well develop, well nourished  Heart: S1-S2, rrr  Lungs: CTA bilat  Abd: soft, nt, nt, + bs in all quadrants  Ext:  Pos distal pulses to DP, PT      X-ray: right knee shows end stage OA    Labs: pending labs and clearances    A:  Right  knee end stage OA    P:  At this point we will move forward with surgery. Again, the alternatives, risks, complications, as well as expected outcome were discussed and the patient wishes to proceed with surgery. Pt has been instructed to stop aspirin, nsaids, rheumatologic medications and blood thinners. They have also been instructed to continue on any heart and bp meds and to take them the morning of surgery with sips of water.    The patient will require in patient admission due to above stated medical conditions as well the the surgical challenges given the anatomy and bone quality.          Izella Ill PA-C Dr. Paz Scheuermann

## 2018-06-01 RX ORDER — LEVOFLOXACIN 750 MG/1
750 TABLET ORAL DAILY
Qty: 5 TAB | Refills: 0 | Status: SHIPPED | OUTPATIENT
Start: 2018-06-01 | End: 2018-06-07

## 2018-06-05 ENCOUNTER — ANESTHESIA EVENT (OUTPATIENT)
Dept: SURGERY | Age: 58
DRG: 470 | End: 2018-06-05
Payer: COMMERCIAL

## 2018-06-06 ENCOUNTER — ANESTHESIA (OUTPATIENT)
Dept: SURGERY | Age: 58
DRG: 470 | End: 2018-06-06
Payer: COMMERCIAL

## 2018-06-06 ENCOUNTER — APPOINTMENT (OUTPATIENT)
Dept: GENERAL RADIOLOGY | Age: 58
DRG: 470 | End: 2018-06-06
Attending: ORTHOPAEDIC SURGERY
Payer: COMMERCIAL

## 2018-06-06 ENCOUNTER — DOCUMENTATION ONLY (OUTPATIENT)
Dept: ORTHOPEDIC SURGERY | Age: 58
End: 2018-06-06

## 2018-06-06 ENCOUNTER — HOSPITAL ENCOUNTER (INPATIENT)
Age: 58
LOS: 1 days | Discharge: HOME HEALTH | DRG: 470 | End: 2018-06-07
Attending: ORTHOPAEDIC SURGERY | Admitting: ORTHOPAEDIC SURGERY
Payer: COMMERCIAL

## 2018-06-06 DIAGNOSIS — M17.11 ARTHRITIS OF KNEE, RIGHT: Primary | ICD-10-CM

## 2018-06-06 PROCEDURE — C1713 ANCHOR/SCREW BN/BN,TIS/BN: HCPCS | Performed by: ORTHOPAEDIC SURGERY

## 2018-06-06 PROCEDURE — 77030020753 HC CUF TRNQT 1BLA STRY -B: Performed by: ORTHOPAEDIC SURGERY

## 2018-06-06 PROCEDURE — 74011250637 HC RX REV CODE- 250/637: Performed by: ORTHOPAEDIC SURGERY

## 2018-06-06 PROCEDURE — 77030010783 HC BOWL MX BN CEM J&J -B: Performed by: ORTHOPAEDIC SURGERY

## 2018-06-06 PROCEDURE — 97165 OT EVAL LOW COMPLEX 30 MIN: CPT

## 2018-06-06 PROCEDURE — 97161 PT EVAL LOW COMPLEX 20 MIN: CPT

## 2018-06-06 PROCEDURE — 74011250636 HC RX REV CODE- 250/636

## 2018-06-06 PROCEDURE — 77030038020 HC MANFLD NEPTUNE STRY -B: Performed by: ORTHOPAEDIC SURGERY

## 2018-06-06 PROCEDURE — 77030026438 HC STYL ET INTUB CARD -A: Performed by: NURSE ANESTHETIST, CERTIFIED REGISTERED

## 2018-06-06 PROCEDURE — 77030018836 HC SOL IRR NACL ICUM -A: Performed by: ORTHOPAEDIC SURGERY

## 2018-06-06 PROCEDURE — 74011250636 HC RX REV CODE- 250/636: Performed by: NURSE ANESTHETIST, CERTIFIED REGISTERED

## 2018-06-06 PROCEDURE — 77030013708 HC HNDPC SUC IRR PULS STRY –B: Performed by: ORTHOPAEDIC SURGERY

## 2018-06-06 PROCEDURE — 74011250637 HC RX REV CODE- 250/637: Performed by: NURSE ANESTHETIST, CERTIFIED REGISTERED

## 2018-06-06 PROCEDURE — 76210000016 HC OR PH I REC 1 TO 1.5 HR: Performed by: ORTHOPAEDIC SURGERY

## 2018-06-06 PROCEDURE — 74011000250 HC RX REV CODE- 250: Performed by: ORTHOPAEDIC SURGERY

## 2018-06-06 PROCEDURE — 65270000029 HC RM PRIVATE

## 2018-06-06 PROCEDURE — 76060000035 HC ANESTHESIA 2 TO 2.5 HR: Performed by: ORTHOPAEDIC SURGERY

## 2018-06-06 PROCEDURE — 77030008683 HC TU ET CUF COVD -A: Performed by: NURSE ANESTHETIST, CERTIFIED REGISTERED

## 2018-06-06 PROCEDURE — 77030020782 HC GWN BAIR PAWS FLX 3M -B: Performed by: ORTHOPAEDIC SURGERY

## 2018-06-06 PROCEDURE — 77030029494 HC CLD THER UNIT ICMN DJOR -B: Performed by: ORTHOPAEDIC SURGERY

## 2018-06-06 PROCEDURE — 3E0T3BZ INTRODUCTION OF ANESTHETIC AGENT INTO PERIPHERAL NERVES AND PLEXI, PERCUTANEOUS APPROACH: ICD-10-PCS | Performed by: ANESTHESIOLOGY

## 2018-06-06 PROCEDURE — 74011000250 HC RX REV CODE- 250: Performed by: NURSE ANESTHETIST, CERTIFIED REGISTERED

## 2018-06-06 PROCEDURE — 77030025371 HC PIN FIX QD SIG J&J -D: Performed by: ORTHOPAEDIC SURGERY

## 2018-06-06 PROCEDURE — 77030012012 HC AIRWY OP SFT TELE -A: Performed by: NURSE ANESTHETIST, CERTIFIED REGISTERED

## 2018-06-06 PROCEDURE — 74011250636 HC RX REV CODE- 250/636: Performed by: ORTHOPAEDIC SURGERY

## 2018-06-06 PROCEDURE — 77030019605: Performed by: ORTHOPAEDIC SURGERY

## 2018-06-06 PROCEDURE — 74011000250 HC RX REV CODE- 250

## 2018-06-06 PROCEDURE — 77030012893: Performed by: ORTHOPAEDIC SURGERY

## 2018-06-06 PROCEDURE — 77030027138 HC INCENT SPIROMETER -A

## 2018-06-06 PROCEDURE — 74011000258 HC RX REV CODE- 258: Performed by: ORTHOPAEDIC SURGERY

## 2018-06-06 PROCEDURE — 77030028224 HC PDNG CST BSNM -A: Performed by: ORTHOPAEDIC SURGERY

## 2018-06-06 PROCEDURE — 76010000171 HC OR TIME 2 TO 2.5 HR INTENSV-TIER 1: Performed by: ORTHOPAEDIC SURGERY

## 2018-06-06 PROCEDURE — C9290 INJ, BUPIVACAINE LIPOSOME: HCPCS | Performed by: ORTHOPAEDIC SURGERY

## 2018-06-06 PROCEDURE — 64450 NJX AA&/STRD OTHER PN/BRANCH: CPT | Performed by: ANESTHESIOLOGY

## 2018-06-06 PROCEDURE — 77030031139 HC SUT VCRL2 J&J -A: Performed by: ORTHOPAEDIC SURGERY

## 2018-06-06 PROCEDURE — 77030011640 HC PAD GRND REM COVD -A: Performed by: ORTHOPAEDIC SURGERY

## 2018-06-06 PROCEDURE — 77030006835 HC BLD SAW SAG STRY -B: Performed by: ORTHOPAEDIC SURGERY

## 2018-06-06 PROCEDURE — 0SRC0J9 REPLACEMENT OF RIGHT KNEE JOINT WITH SYNTHETIC SUBSTITUTE, CEMENTED, OPEN APPROACH: ICD-10-PCS | Performed by: ORTHOPAEDIC SURGERY

## 2018-06-06 PROCEDURE — L1830 KO IMMOB CANVAS LONG PRE OTS: HCPCS | Performed by: ORTHOPAEDIC SURGERY

## 2018-06-06 PROCEDURE — 77030003666 HC NDL SPINAL BD -A: Performed by: ORTHOPAEDIC SURGERY

## 2018-06-06 PROCEDURE — 77030018835 HC SOL IRR LR ICUM -A: Performed by: ORTHOPAEDIC SURGERY

## 2018-06-06 PROCEDURE — 97530 THERAPEUTIC ACTIVITIES: CPT

## 2018-06-06 PROCEDURE — 73560 X-RAY EXAM OF KNEE 1 OR 2: CPT

## 2018-06-06 PROCEDURE — 76942 ECHO GUIDE FOR BIOPSY: CPT | Performed by: ANESTHESIOLOGY

## 2018-06-06 DEVICE — CEMENT BNE 40GM FULL DOSE PMMA W/ GENT HI VISC RADPQ LNG: Type: IMPLANTABLE DEVICE | Site: KNEE | Status: FUNCTIONAL

## 2018-06-06 DEVICE — COMPONENT PAT DIA32MM POLYETH DOME CEM MEDIALIZED ATTUNE: Type: IMPLANTABLE DEVICE | Site: KNEE | Status: FUNCTIONAL

## 2018-06-06 DEVICE — COMPONENT FEM SZ 5 R KNEE POST STBL CEM ATTUNE: Type: IMPLANTABLE DEVICE | Site: KNEE | Status: FUNCTIONAL

## 2018-06-06 RX ORDER — SODIUM CHLORIDE 900 MG/100ML
INJECTION INTRAVENOUS
Status: DISCONTINUED
Start: 2018-06-06 | End: 2018-06-06

## 2018-06-06 RX ORDER — NEOMYCIN AND POLYMYXIN B SULFATES 40; 200000 MG/ML; [USP'U]/ML
SOLUTION IRRIGATION AS NEEDED
Status: DISCONTINUED | OUTPATIENT
Start: 2018-06-06 | End: 2018-06-06 | Stop reason: HOSPADM

## 2018-06-06 RX ORDER — PROPOFOL 10 MG/ML
INJECTION, EMULSION INTRAVENOUS AS NEEDED
Status: DISCONTINUED | OUTPATIENT
Start: 2018-06-06 | End: 2018-06-06

## 2018-06-06 RX ORDER — MIDAZOLAM HYDROCHLORIDE 1 MG/ML
2 INJECTION, SOLUTION INTRAMUSCULAR; INTRAVENOUS
Status: DISCONTINUED | OUTPATIENT
Start: 2018-06-06 | End: 2018-06-06 | Stop reason: HOSPADM

## 2018-06-06 RX ORDER — SIMVASTATIN 20 MG/1
20 TABLET, FILM COATED ORAL
Status: DISCONTINUED | OUTPATIENT
Start: 2018-06-06 | End: 2018-06-07 | Stop reason: HOSPADM

## 2018-06-06 RX ORDER — FENTANYL CITRATE 50 UG/ML
50 INJECTION, SOLUTION INTRAMUSCULAR; INTRAVENOUS
Status: DISCONTINUED | OUTPATIENT
Start: 2018-06-06 | End: 2018-06-06 | Stop reason: HOSPADM

## 2018-06-06 RX ORDER — OXYCODONE AND ACETAMINOPHEN 5; 325 MG/1; MG/1
1 TABLET ORAL
Status: DISCONTINUED | OUTPATIENT
Start: 2018-06-06 | End: 2018-06-07 | Stop reason: HOSPADM

## 2018-06-06 RX ORDER — GABAPENTIN 300 MG/1
300 CAPSULE ORAL 2 TIMES DAILY
Status: DISCONTINUED | OUTPATIENT
Start: 2018-06-06 | End: 2018-06-07 | Stop reason: HOSPADM

## 2018-06-06 RX ORDER — ONDANSETRON 2 MG/ML
INJECTION INTRAMUSCULAR; INTRAVENOUS AS NEEDED
Status: DISCONTINUED | OUTPATIENT
Start: 2018-06-06 | End: 2018-06-06 | Stop reason: HOSPADM

## 2018-06-06 RX ORDER — DEXAMETHASONE SODIUM PHOSPHATE 4 MG/ML
INJECTION, SOLUTION INTRA-ARTICULAR; INTRALESIONAL; INTRAMUSCULAR; INTRAVENOUS; SOFT TISSUE AS NEEDED
Status: DISCONTINUED | OUTPATIENT
Start: 2018-06-06 | End: 2018-06-06 | Stop reason: HOSPADM

## 2018-06-06 RX ORDER — ONDANSETRON 2 MG/ML
4 INJECTION INTRAMUSCULAR; INTRAVENOUS ONCE
Status: DISCONTINUED | OUTPATIENT
Start: 2018-06-06 | End: 2018-06-06 | Stop reason: HOSPADM

## 2018-06-06 RX ORDER — FAMOTIDINE 20 MG/1
20 TABLET, FILM COATED ORAL ONCE
Status: COMPLETED | OUTPATIENT
Start: 2018-06-06 | End: 2018-06-06

## 2018-06-06 RX ORDER — PROPOFOL 10 MG/ML
INJECTION, EMULSION INTRAVENOUS AS NEEDED
Status: DISCONTINUED | OUTPATIENT
Start: 2018-06-06 | End: 2018-06-06 | Stop reason: HOSPADM

## 2018-06-06 RX ORDER — DEXTROSE 50 % IN WATER (D50W) INTRAVENOUS SYRINGE
25-50 AS NEEDED
Status: DISCONTINUED | OUTPATIENT
Start: 2018-06-06 | End: 2018-06-06 | Stop reason: HOSPADM

## 2018-06-06 RX ORDER — GABAPENTIN 100 MG/1
300 CAPSULE ORAL
Qty: 6 CAP | Refills: 0 | Status: SHIPPED | OUTPATIENT
Start: 2018-06-06 | End: 2018-07-19

## 2018-06-06 RX ORDER — PANTOPRAZOLE SODIUM 40 MG/1
40 TABLET, DELAYED RELEASE ORAL
Status: DISCONTINUED | OUTPATIENT
Start: 2018-06-06 | End: 2018-06-07 | Stop reason: HOSPADM

## 2018-06-06 RX ORDER — SODIUM CHLORIDE 9 MG/ML
INJECTION, SOLUTION INTRAVENOUS
Status: DISCONTINUED | OUTPATIENT
Start: 2018-06-06 | End: 2018-06-06 | Stop reason: HOSPADM

## 2018-06-06 RX ORDER — SODIUM CHLORIDE 0.9 % (FLUSH) 0.9 %
5-10 SYRINGE (ML) INJECTION EVERY 8 HOURS
Status: DISCONTINUED | OUTPATIENT
Start: 2018-06-06 | End: 2018-06-07 | Stop reason: HOSPADM

## 2018-06-06 RX ORDER — ROPIVACAINE HYDROCHLORIDE 2 MG/ML
30 INJECTION, SOLUTION EPIDURAL; INFILTRATION; PERINEURAL
Status: COMPLETED | OUTPATIENT
Start: 2018-06-06 | End: 2018-06-06

## 2018-06-06 RX ORDER — SUCCINYLCHOLINE CHLORIDE 20 MG/ML
INJECTION INTRAMUSCULAR; INTRAVENOUS AS NEEDED
Status: DISCONTINUED | OUTPATIENT
Start: 2018-06-06 | End: 2018-06-06 | Stop reason: HOSPADM

## 2018-06-06 RX ORDER — INSULIN LISPRO 100 [IU]/ML
INJECTION, SOLUTION INTRAVENOUS; SUBCUTANEOUS ONCE
Status: DISCONTINUED | OUTPATIENT
Start: 2018-06-06 | End: 2018-06-06 | Stop reason: HOSPADM

## 2018-06-06 RX ORDER — MIDAZOLAM HYDROCHLORIDE 1 MG/ML
INJECTION, SOLUTION INTRAMUSCULAR; INTRAVENOUS AS NEEDED
Status: DISCONTINUED | OUTPATIENT
Start: 2018-06-06 | End: 2018-06-06 | Stop reason: HOSPADM

## 2018-06-06 RX ORDER — SODIUM CHLORIDE, SODIUM LACTATE, POTASSIUM CHLORIDE, CALCIUM CHLORIDE 600; 310; 30; 20 MG/100ML; MG/100ML; MG/100ML; MG/100ML
INJECTION, SOLUTION INTRAVENOUS
Status: DISCONTINUED | OUTPATIENT
Start: 2018-06-06 | End: 2018-06-06 | Stop reason: HOSPADM

## 2018-06-06 RX ORDER — ROCURONIUM BROMIDE 10 MG/ML
INJECTION, SOLUTION INTRAVENOUS AS NEEDED
Status: DISCONTINUED | OUTPATIENT
Start: 2018-06-06 | End: 2018-06-06 | Stop reason: HOSPADM

## 2018-06-06 RX ORDER — SODIUM CHLORIDE 0.9 % (FLUSH) 0.9 %
5-10 SYRINGE (ML) INJECTION AS NEEDED
Status: DISCONTINUED | OUTPATIENT
Start: 2018-06-06 | End: 2018-06-07 | Stop reason: HOSPADM

## 2018-06-06 RX ORDER — LIDOCAINE HYDROCHLORIDE 10 MG/ML
0.1 INJECTION, SOLUTION EPIDURAL; INFILTRATION; INTRACAUDAL; PERINEURAL AS NEEDED
Status: DISCONTINUED | OUTPATIENT
Start: 2018-06-06 | End: 2018-06-06 | Stop reason: HOSPADM

## 2018-06-06 RX ORDER — CEFAZOLIN SODIUM 2 G/50ML
2 SOLUTION INTRAVENOUS EVERY 8 HOURS
Status: COMPLETED | OUTPATIENT
Start: 2018-06-06 | End: 2018-06-07

## 2018-06-06 RX ORDER — CEFAZOLIN SODIUM 2 G/50ML
2 SOLUTION INTRAVENOUS ONCE
Status: COMPLETED | OUTPATIENT
Start: 2018-06-06 | End: 2018-06-06

## 2018-06-06 RX ORDER — METHOCARBAMOL 500 MG/1
500 TABLET, FILM COATED ORAL
Qty: 30 TAB | Refills: 0 | Status: SHIPPED | OUTPATIENT
Start: 2018-06-06 | End: 2019-10-09

## 2018-06-06 RX ORDER — ONDANSETRON 2 MG/ML
4 INJECTION INTRAMUSCULAR; INTRAVENOUS
Status: DISCONTINUED | OUTPATIENT
Start: 2018-06-06 | End: 2018-06-07 | Stop reason: HOSPADM

## 2018-06-06 RX ORDER — SODIUM CHLORIDE, SODIUM LACTATE, POTASSIUM CHLORIDE, CALCIUM CHLORIDE 600; 310; 30; 20 MG/100ML; MG/100ML; MG/100ML; MG/100ML
50 INJECTION, SOLUTION INTRAVENOUS CONTINUOUS
Status: DISCONTINUED | OUTPATIENT
Start: 2018-06-06 | End: 2018-06-06 | Stop reason: HOSPADM

## 2018-06-06 RX ORDER — SODIUM CHLORIDE 9 MG/ML
125 INJECTION, SOLUTION INTRAVENOUS CONTINUOUS
Status: DISCONTINUED | OUTPATIENT
Start: 2018-06-06 | End: 2018-06-07 | Stop reason: HOSPADM

## 2018-06-06 RX ORDER — FENTANYL CITRATE 50 UG/ML
INJECTION, SOLUTION INTRAMUSCULAR; INTRAVENOUS AS NEEDED
Status: DISCONTINUED | OUTPATIENT
Start: 2018-06-06 | End: 2018-06-06 | Stop reason: HOSPADM

## 2018-06-06 RX ORDER — CEPHALEXIN 500 MG/1
500 CAPSULE ORAL 4 TIMES DAILY
Qty: 4 CAP | Refills: 0 | Status: SHIPPED | OUTPATIENT
Start: 2018-06-06 | End: 2018-06-07

## 2018-06-06 RX ORDER — OXYCODONE AND ACETAMINOPHEN 5; 325 MG/1; MG/1
1 TABLET ORAL ONCE
Status: COMPLETED | OUTPATIENT
Start: 2018-06-06 | End: 2018-06-06

## 2018-06-06 RX ORDER — METHOCARBAMOL 500 MG/1
500 TABLET, FILM COATED ORAL
Status: DISCONTINUED | OUTPATIENT
Start: 2018-06-06 | End: 2018-06-07 | Stop reason: HOSPADM

## 2018-06-06 RX ORDER — MAGNESIUM SULFATE 100 %
4 CRYSTALS MISCELLANEOUS AS NEEDED
Status: DISCONTINUED | OUTPATIENT
Start: 2018-06-06 | End: 2018-06-06 | Stop reason: HOSPADM

## 2018-06-06 RX ORDER — ACETAMINOPHEN 325 MG/1
650 TABLET ORAL
Status: DISCONTINUED | OUTPATIENT
Start: 2018-06-06 | End: 2018-06-07 | Stop reason: HOSPADM

## 2018-06-06 RX ORDER — DOCUSATE SODIUM 100 MG/1
100 CAPSULE, LIQUID FILLED ORAL 2 TIMES DAILY
Status: DISCONTINUED | OUTPATIENT
Start: 2018-06-06 | End: 2018-06-07 | Stop reason: HOSPADM

## 2018-06-06 RX ORDER — OXYCODONE AND ACETAMINOPHEN 5; 325 MG/1; MG/1
1 TABLET ORAL
Qty: 40 TAB | Refills: 0 | Status: SHIPPED | OUTPATIENT
Start: 2018-06-06 | End: 2019-10-09

## 2018-06-06 RX ORDER — FENTANYL CITRATE 50 UG/ML
100 INJECTION, SOLUTION INTRAMUSCULAR; INTRAVENOUS
Status: DISCONTINUED | OUTPATIENT
Start: 2018-06-06 | End: 2018-06-06 | Stop reason: HOSPADM

## 2018-06-06 RX ORDER — SODIUM CHLORIDE 9 MG/ML
1000 INJECTION, SOLUTION INTRAVENOUS ONCE
Status: COMPLETED | OUTPATIENT
Start: 2018-06-06 | End: 2018-06-06

## 2018-06-06 RX ORDER — LIDOCAINE HYDROCHLORIDE 20 MG/ML
INJECTION, SOLUTION EPIDURAL; INFILTRATION; INTRACAUDAL; PERINEURAL AS NEEDED
Status: DISCONTINUED | OUTPATIENT
Start: 2018-06-06 | End: 2018-06-06 | Stop reason: HOSPADM

## 2018-06-06 RX ORDER — NALOXONE HYDROCHLORIDE 0.4 MG/ML
0.4 INJECTION, SOLUTION INTRAMUSCULAR; INTRAVENOUS; SUBCUTANEOUS AS NEEDED
Status: DISCONTINUED | OUTPATIENT
Start: 2018-06-06 | End: 2018-06-07 | Stop reason: HOSPADM

## 2018-06-06 RX ORDER — SODIUM CHLORIDE 0.9 % (FLUSH) 0.9 %
5-10 SYRINGE (ML) INJECTION AS NEEDED
Status: DISCONTINUED | OUTPATIENT
Start: 2018-06-06 | End: 2018-06-06 | Stop reason: HOSPADM

## 2018-06-06 RX ADMIN — PROPOFOL 30 MG: 10 INJECTION, EMULSION INTRAVENOUS at 11:10

## 2018-06-06 RX ADMIN — CEFAZOLIN SODIUM 2 G: 2 SOLUTION INTRAVENOUS at 17:16

## 2018-06-06 RX ADMIN — SUCCINYLCHOLINE CHLORIDE 100 MG: 20 INJECTION INTRAMUSCULAR; INTRAVENOUS at 10:37

## 2018-06-06 RX ADMIN — DOCUSATE SODIUM 100 MG: 100 CAPSULE, LIQUID FILLED ORAL at 17:16

## 2018-06-06 RX ADMIN — Medication 10 ML: at 21:50

## 2018-06-06 RX ADMIN — GABAPENTIN 300 MG: 300 CAPSULE ORAL at 17:16

## 2018-06-06 RX ADMIN — ROPIVACAINE HYDROCHLORIDE 60 MG: 2 INJECTION, SOLUTION EPIDURAL; INFILTRATION at 09:50

## 2018-06-06 RX ADMIN — DEXAMETHASONE SODIUM PHOSPHATE 4 MG: 4 INJECTION, SOLUTION INTRA-ARTICULAR; INTRALESIONAL; INTRAMUSCULAR; INTRAVENOUS; SOFT TISSUE at 10:33

## 2018-06-06 RX ADMIN — LIDOCAINE HYDROCHLORIDE 60 MG: 20 INJECTION, SOLUTION EPIDURAL; INFILTRATION; INTRACAUDAL; PERINEURAL at 10:37

## 2018-06-06 RX ADMIN — PROPOFOL 150 MG: 10 INJECTION, EMULSION INTRAVENOUS at 10:37

## 2018-06-06 RX ADMIN — MIDAZOLAM HYDROCHLORIDE 2 MG: 1 INJECTION, SOLUTION INTRAMUSCULAR; INTRAVENOUS at 09:43

## 2018-06-06 RX ADMIN — PROPOFOL 50 MG: 10 INJECTION, EMULSION INTRAVENOUS at 10:42

## 2018-06-06 RX ADMIN — ONDANSETRON HYDROCHLORIDE 4 MG: 2 INJECTION INTRAMUSCULAR; INTRAVENOUS at 14:55

## 2018-06-06 RX ADMIN — RIVAROXABAN 10 MG: 10 TABLET, FILM COATED ORAL at 18:40

## 2018-06-06 RX ADMIN — Medication 10 ML: at 16:03

## 2018-06-06 RX ADMIN — SIMVASTATIN 20 MG: 20 TABLET, FILM COATED ORAL at 21:25

## 2018-06-06 RX ADMIN — LIDOCAINE HYDROCHLORIDE 0.1 ML: 10 INJECTION, SOLUTION EPIDURAL; INFILTRATION; INTRACAUDAL; PERINEURAL at 09:45

## 2018-06-06 RX ADMIN — FENTANYL CITRATE 50 MCG: 50 INJECTION, SOLUTION INTRAMUSCULAR; INTRAVENOUS at 10:26

## 2018-06-06 RX ADMIN — CEFAZOLIN SODIUM 2 G: 2 SOLUTION INTRAVENOUS at 10:24

## 2018-06-06 RX ADMIN — OXYCODONE HYDROCHLORIDE AND ACETAMINOPHEN 1 TABLET: 5; 325 TABLET ORAL at 21:25

## 2018-06-06 RX ADMIN — FENTANYL CITRATE 50 MCG: 50 INJECTION, SOLUTION INTRAMUSCULAR; INTRAVENOUS at 10:37

## 2018-06-06 RX ADMIN — ONDANSETRON 4 MG: 2 INJECTION INTRAMUSCULAR; INTRAVENOUS at 12:10

## 2018-06-06 RX ADMIN — OXYCODONE HYDROCHLORIDE AND ACETAMINOPHEN 1 TABLET: 5; 325 TABLET ORAL at 09:05

## 2018-06-06 RX ADMIN — MIDAZOLAM HYDROCHLORIDE 2 MG: 1 INJECTION, SOLUTION INTRAMUSCULAR; INTRAVENOUS at 10:26

## 2018-06-06 RX ADMIN — SODIUM CHLORIDE, SODIUM LACTATE, POTASSIUM CHLORIDE, AND CALCIUM CHLORIDE 50 ML/HR: 600; 310; 30; 20 INJECTION, SOLUTION INTRAVENOUS at 09:05

## 2018-06-06 RX ADMIN — SODIUM CHLORIDE, SODIUM LACTATE, POTASSIUM CHLORIDE, CALCIUM CHLORIDE: 600; 310; 30; 20 INJECTION, SOLUTION INTRAVENOUS at 10:26

## 2018-06-06 RX ADMIN — SODIUM CHLORIDE 1000 ML: 9 INJECTION, SOLUTION INTRAVENOUS at 13:00

## 2018-06-06 RX ADMIN — VANCOMYCIN HYDROCHLORIDE 1000 MG: 1 INJECTION, POWDER, LYOPHILIZED, FOR SOLUTION INTRAVENOUS at 10:45

## 2018-06-06 RX ADMIN — SODIUM CHLORIDE 125 ML/HR: 900 INJECTION, SOLUTION INTRAVENOUS at 14:54

## 2018-06-06 RX ADMIN — TRANEXAMIC ACID 1000 MG: 100 INJECTION, SOLUTION INTRAVENOUS at 12:00

## 2018-06-06 RX ADMIN — OXYCODONE HYDROCHLORIDE AND ACETAMINOPHEN 1 TABLET: 5; 325 TABLET ORAL at 17:16

## 2018-06-06 RX ADMIN — FENTANYL CITRATE 50 MCG: 50 INJECTION INTRAMUSCULAR; INTRAVENOUS at 09:42

## 2018-06-06 RX ADMIN — FAMOTIDINE 20 MG: 20 TABLET ORAL at 09:05

## 2018-06-06 RX ADMIN — PANTOPRAZOLE SODIUM 40 MG: 40 TABLET, DELAYED RELEASE ORAL at 16:04

## 2018-06-06 RX ADMIN — METHOCARBAMOL 500 MG: 500 TABLET ORAL at 16:02

## 2018-06-06 RX ADMIN — ROCURONIUM BROMIDE 5 MG: 10 INJECTION, SOLUTION INTRAVENOUS at 10:35

## 2018-06-06 NOTE — PROGRESS NOTES
Problem: Self Care Deficits Care Plan (Adult)  Goal: *Acute Goals and Plan of Care (Insert Text)  Outcome: Resolved/Met Date Met: 06/06/18  Occupational Therapy EVALUATION/discharge    Patient: Nallely Romano (24 y.o. female)  Date: 6/6/2018  Primary Diagnosis: Osteoarthritis of right knee, unspecified osteoarthritis type [M17.11]  Procedure(s) (LRB):  RIGHT TOTAL KNEE ARTHROPLASTY/C-ARM/DEPUY ATTUNE/2 SA'S/ADDUCTOR BLOCK (Right) Day of Surgery   Precautions:   Fall, WBAT    ASSESSMENT AND RECOMMENDATIONS:  Based on the objective data described below, the patient is able to perform basic self care tasks without assistance, while seated. Supervision to CGA given for functional standing and transfers. Nausea and dizziness noted when seated/standing at EOB. Will defer to PT for mobility training. Patient has a supportive sister at home to assist her prn. Discussed use of a seat in the shower for safety and a bedside commode/RW was already ordered for her prior to surgery. Skilled occupational therapy is not indicated at this time. Discharge Recommendations: None  Further Equipment Recommendations for Discharge: N/A      Barriers to Learning/Limitations: None  Compensate with: visual, verbal, tactile, kinesthetic cues/model     COMPLEXITY     Eval Complexity: History: LOW Complexity : Brief history review ; Examination: LOW Complexity : 1-3 performance deficits relating to physical, cognitive , or psychosocial skils that result in activity limitations and / or participation restrictions ; Decision Making:LOW Complexity : No comorbidities that affect functional and no verbal or physical assistance needed to complete eval tasks  Assessment: Low Complexity        G-CODES:     Self Care  Current  CI= 1-19%   Goal  CI= 1-19%   D/C  CI= 1-19%. The severity rating is based on the Level of Assistance required for Functional Mobility and ADLs. SUBJECTIVE:   Patient stated I'm nauseous.     OBJECTIVE DATA SUMMARY:     Past Medical History:   Diagnosis Date    Arthritis      Past Surgical History:   Procedure Laterality Date    HX KNEE ARTHROSCOPY       Prior Level of Function/Home Situation: Pt was independent with basic self care tasks and functional mobility PTA. Home Situation  Home Environment: Private residence  # Steps to Enter: 1  One/Two Story Residence: Two story  # of Interior Steps: 15  Interior Rails: Right  Living Alone: No  Support Systems: Family member(s)  Patient Expects to be Discharged to[de-identified] Private residence  Current DME Used/Available at Home: Shower chair  Tub or Shower Type: Shower (with built in seat)  [x]     Right hand dominant   []     Left hand dominant  Cognitive/Behavioral Status:  Neurologic State: Alert  Orientation Level: Oriented X4  Cognition: Appropriate decision making; Follows commands  Safety/Judgement: Awareness of environment; Fall prevention    Skin: Intact on UEs    Edema: None noted in UEs    Vision/Perceptual:    Acuity: Within Defined Limits      Coordination:  Coordination: Within functional limits (B LE)  Fine Motor Skills-Upper: Left Intact; Right Intact    Gross Motor Skills-Upper: Left Intact; Right Intact    Balance:  Sitting: Intact  Standing: Impaired; With support  Standing - Static: Fair  Standing - Dynamic : Fair    Strength:  Strength: WFL BUEs    Tone & Sensation:  Tone: Normal (B UE)  Sensation: Intact (B UE)    Range of Motion:  AROM: WFL BUEs    Functional Mobility and Transfers for ADLs:  Bed Mobility:  Supine to Sit: Contact guard assistance  Sit to Supine: Minimum assistance  Scooting: Stand-by assistance  Transfers:  Sit to Stand: Contact guard assistance   Toilet Transfer: Contact guard assistance    ADL Assessment:  Feeding: Independent    Oral Facial Hygiene/Grooming: Independent    Bathing: Supervision    Upper Body Dressing: Independent    Lower Body Dressing: Supervision    Toileting: Modified independent    Pain:  Pt reports 8/10 pain or discomfort prior to treatment, in right knee. Nursing aware.    Pt reports 8/10 pain or discomfort post treatment, in right knee. Patient resting in bed at end of session. Activity Tolerance:   Good    Please refer to the flowsheet for vital signs taken during this treatment. After treatment:   []  Patient left in no apparent distress sitting up in chair  [x]  Patient left in no apparent distress in bed  [x]  Call bell left within reach  []  Nursing notified  [x]  Caregiver (family) present  []  Bed alarm activated    COMMUNICATION/EDUCATION:   Communication/Collaboration:  [x]      Home safety education was provided and the patient/caregiver indicated understanding. [x]      Patient/family have participated as able and agree with findings and recommendations. []      Patient is unable to participate in plan of care at this time.     Randy Burnett MS OTR/L  Time Calculation: 15 mins

## 2018-06-06 NOTE — BRIEF OP NOTE
BRIEF OPERATIVE NOTE    Date of Procedure: 6/6/2018   Preoperative Diagnosis: Osteoarthritis of right knee, unspecified osteoarthritis type [M17.11]  Postoperative Diagnosis: Osteoarthritis of right knee, unspecified osteoarthritis type [M17.11]    Procedure(s):  RIGHT TOTAL KNEE ARTHROPLASTY/C-ARM/DEPUY ATTUNE/2 SA'S/ADDUCTOR BLOCK  Surgeon(s) and Role:     * Colleen Hamman, MD - Primary         Surgical Assistant: Samantha Dover    Surgical Staff:  Circ-1: Taiwo Serrato RN  Circ-Relief: Naima Atkins RN  Scrub Tech-1: Damaris Pro  Scrub Tech-Relief: Maren Riverabes  Surg Asst-1: Charlotte Daniele  Event Time In   Incision Start 1056   Incision Close      Anesthesia: General   Estimated Blood Loss: 75 ml  Specimens: * No specimens in log *   Findings: osteoarthritis right knee  Complications: none  Implants:   Implant Name Type Inv.  Item Serial No.  Lot No. LRB No. Used Action   CEMENT [facility] 40GM -- [employer] - [id number]  CEMENT [facility] 40GM -- [employer]  [facility] [employer] [id number] Right 2 Implanted   PAT MICHELLE [employer] 32MM -- ATTUNE - [id number]  PAT MICHELLE [employer] 32MM -- ATTUNE  [employer] [id number] Right 1 Implanted   FEM PS SZ 5 RT MICHELLE -- ATTUNE - [id number]  FEM PS SZ 5 RT MICHELLE -- ATTUNE  [employer] [id number] Right 1 Implanted   ATTUNE TIBIAL BASE ROTATING PLATFORM SIZE 4    [employer] [id number] Right 1 Implanted   ATTUNE TIBIAL INSERT ROTATING PLATFORM POSTERIOR STABILIZED SIZE 5       [employer] [id number] Right 1 Implanted

## 2018-06-06 NOTE — ANESTHESIA PROCEDURE NOTES
Peripheral Block    Start time: 6/6/2018 9:40 AM  End time: 6/6/2018 9:50 AM  Performed by: Felipe Barker  Authorized by: Felipe Barker       Pre-procedure: Indications: at surgeon's request and post-op pain management    Preanesthetic Checklist: patient identified, risks and benefits discussed, site marked, timeout performed, anesthesia consent given and patient being monitored    Timeout Time: 09:41          Block Type:   Block Type:   Adductor canal  Laterality:  Right  Monitoring:  Standard ASA monitoring, continuous pulse ox, heart rate, frequent vital sign checks, responsive to questions and oxygen  Injection Technique:  Single shot  Procedures: ultrasound guided and nerve stimulator    Patient Position: supine  Prep: chlorhexidine    Location:  Mid thigh  Needle Type:  Stimuplex  Needle Gauge:  21 G  Needle Localization:  Nerve stimulator, ultrasound guidance and infiltration  Motor Response: minimal motor response >0.4 mA    Medication Injected:  0.2%  ropivacaine  Volume (mL):  30  Add'l Medication Injected:  1.0%  lidocaine  Volume (mL):  2    Assessment:  Number of attempts:  1  Injection Assessment:  Incremental injection every 5 mL, negative aspiration for CSF, no paresthesia, ultrasound image on chart, local visualized surrounding nerve on ultrasound, negative aspiration for blood and no intravascular symptoms  Patient tolerance:  Patient tolerated the procedure well with no immediate complications

## 2018-06-06 NOTE — IP AVS SNAPSHOT
[facility] 
 
 
 [address] [facility] [address] [facility] Patient: Gallito Bush MRN: [id number] OWB:[id number] About your hospitalization You were admitted on:  June 6, 2018 You last received care in the:  [facility] [address] [facility] 1980 You were discharged on:  June 7, 2018 Why you were hospitalized Your primary diagnosis was:  Not on File Your diagnoses also included: Arthritis Of Knee, Right Follow-up Information Follow up With Details Comments Contact Info Enmanuel Martinez MD On 6/14/2018 Appointment at 1:15pm [address] [facility] [address] [place] [address] 
[phone] Karo Ramos MD On 6/21/2018 Appointment at 2:30pm with Mesfin Flor [address] [place] [address] 
[phone] Your Scheduled Appointments Thursday June 21, 2018  2:30 PM EDT  
POST OP with Ann Palmer, [facility] ([facility]) Luz Maria Reaves, [address] [place] [address]  
[phone] Discharge Orders None A check carolina indicates which time of day the medication should be taken. My Medications START taking these medications Instructions Each Dose to Equal  
 Morning Noon Evening Bedtime  
 cephALEXin 500 mg capsule Commonly known as:  Junius Alpers Your last dose was: Your next dose is: Take 1 Cap by mouth four (4) times daily for 1 day. 500 mg  
    
   
   
   
  
 gabapentin 100 mg capsule Commonly known as:  NEURONTIN Your last dose was: Your next dose is: Take 3 Caps by mouth Before breakfast and dinner. 300 mg Ice [facility] Your last dose was: Your next dose is:    
   
   
 Apply polar care to right knee every day while awake. methocarbamol 500 mg tablet Commonly known as:  ROBAXIN  
   
 Your last dose was: Your next dose is: Take 1 Tab by mouth three (3) times daily as needed. 500 mg  
    
   
   
   
  
 oxyCODONE-acetaminophen 5-325 mg per tablet Commonly known as:  PERCOCET Your last dose was: Your next dose is: Take 1 Tab by mouth every four (4) hours as needed for Pain. Max Daily Amount: 6 Tabs. 1 Tab  
    
   
   
   
  
 rivaroxaban 10 mg tablet Commonly known as:  Alex Holloway Your last dose was: Your next dose is: Take 1 Tab by mouth daily (with dinner). 10 mg CONTINUE taking these medications Instructions Each Dose to Equal  
 Morning Noon Evening Bedtime  
 hydroCHLOROthiazide 25 mg tablet Commonly known as:  HYDRODIURIL Your last dose was: Your next dose is:    
   
   
 take 1 tablet by mouth once daily Omeprazole delayed release 20 mg tablet Commonly known as:  PRILOSEC D/R Your last dose was: Your next dose is:    
   
   
 take 1 tablet by mouth once daily  
     
   
   
   
  
 simvastatin 20 mg tablet Commonly known as:  ZOCOR Your last dose was: Your next dose is:    
   
   
 take 1 tablet by mouth at bedtime VITAMIN D2 50,000 unit capsule Generic drug:  ergocalciferol Your last dose was: Your next dose is:    
   
   
 take 1 capsule by mouth every week STOP taking these medications ADVIL 200 mg tablet Generic drug:  ibuprofen ALEVE 220 mg Cap Generic drug:  naproxen sodium  
   
  
 aspirin delayed-release 81 mg tablet  
   
  
 levoFLOXacin 750 mg tablet Commonly known as:  LEVAQUIN  
   
  
 methylPREDNISolone 4 mg tablet Commonly known as:  MEDROL DOSEPACK  
   
  
 traMADol 50 mg tablet Commonly known as:  ULTRAM  
   
  
  
  
Where to Get Your Medications These medications were sent to [facility]. Marlin Cruz, [address] [facility]. [address] Phone:  [phone]  
  gabapentin 100 mg capsule Information on where to get these meds will be given to you by the nurse or doctor. ! Ask your nurse or doctor about these medications  
  cephALEXin 500 mg capsule Ice Bag Misc  
 methocarbamol 500 mg tablet  
 oxyCODONE-acetaminophen 5-325 mg per tablet  
 rivaroxaban 10 mg tablet Opioid Education Prescription Opioids: What You Need to Know: 
 
 
PATIENT INSTRUCTIONS: 
 
 
F-face looks uneven A-arms unable to move or move unevenly S-speech slurred or non-existent T-time-call 911 as soon as signs and symptoms begin-DO NOT go Back to bed or wait to see if you get better-TIME IS BRAIN. The discharge information has been reviewed with the patient. The patient verbalized understanding. [employer] Activation Thank you for requesting access to [employer]. Please follow the instructions below to securely access and download your online medical record.  [employer] allows you to send messages to your doctor, view your test results, renew your prescriptions, schedule appointments, and more. How Do I Sign Up? 1. In your internet browser, go to [email or web address]. 2. Click on the First Time User? Click Here link in the Sign In box. You will see the New Member Sign Up page. 3. Enter your [employer] Access Code exactly as it appears below. You will not need to use this code after youve completed the sign-up process. If you do not sign up before the expiration date, you must request a new code. [employer] Access Code: [email or web address] Expires: 2012  9:42 AM (This is the date your [employer] access code will ) 4. Enter the last four digits of your Social Security Number (xxxx) and Date of Birth (mm/dd/yyyy) as indicated and click Submit. You will be taken to the next sign-up page. 5. Create a [employer] ID. This will be your [employer] login ID and cannot be changed, so think of one that is secure and easy to remember. 6. Create a [employer] password. You can change your password at any time. 7. Enter your Password Reset Question and Answer. This can be used at a later time if you forget your password. 8. Enter your e-mail address. You will receive e-mail notification when new information is available in [address] 9. Click Sign Up. You can now view and download portions of your medical record. 10. Click the Download Summary menu link to download a portable copy of your medical information. Additional Information If you have questions, please visit the Frequently Asked Questions section of the [employer] website at [email or web address] Remember, [employer] is NOT to be used for urgent needs. For medical emergencies, dial 911. Armband removed and shredded Discharge Instructions for Total Knee Replacement Patients · The dressing on your knee will be changed by the [facility] professional at the appropriate time. Keep your incision clean and dry. Do not apply any ointments to the incision. · You may shower as long as you keep you incision dry. When showering, leave your dressing on. The dressing is waterproof as long as the edges are sealed. · Notify your surgeon if: 
· Your temperature is greater than 100.5 · You have pain not controlled by your pain medication · You have increased drainage from your incision · You have increased redness or swelling in your leg · You have chest pain, shortness of breath, or any other problems · Do your exercises as instructed by the home physical therapist. 
 
· Bend and straighten your operative leg every hour. Walk once an hour during normal walking hours. · During periods of inactivity or rest, your leg should be elevated with a rolled towel or folded pillow under the heel to keep the knee straight. · Do not place anything under the knee. · Do not sit in a recliner chair with the footrest elevated. · You may use ice to your knee for 20-30 minutes after exercise and as needed. Do not apply the ice pack directly to your skin. Use a barrier such as your pant leg or a thin towel. · If you have LANDRY hose (the white support stockings), remove them at bedtime and re-apply the hose in the morning for the next 2 weeks. Best of luck with your new knee and [address] YOU for choosing the [address]! Introducing Charlie Morataya As a Tisha Zendejas patient, I wanted to make you aware of our electronic visit tool called Charlie Morataya. Tisha Zendejas 24/7 allows you to connect within minutes with a medical provider 24 hours a day, seven days a week via a mobile device or tablet or logging into a secure website from your computer. You can access Charlie Earlbill from anywhere in the [place].  
 
A virtual visit might be right for you when you have a simple condition and feel like you just dont want to get out of bed, or cant get away from work for an appointment, when your regular [facility] provider is not available (evenings, weekends or holidays), or when youre out of town and need minor care. Electronic visits cost only $49 and if the [employer] 24/7 provider determines a prescription is needed to treat your condition, one can be electronically transmitted to a nearby pharmacy*. Please take a moment to enroll today if you have not already done so. The enrollment process is free and takes just a few minutes. To enroll, please download the Agendia/PayTango ken to your tablet or phone, or visit [email or web address] to enroll on your computer. And, as an [address] [place] [address] patient with a [employer] account, the results of your visits will be scanned into your electronic medical record and your primary care provider will be able to view the scanned results. We urge you to continue to see your regular [facility] provider for your ongoing medical care. And while your primary care provider may not be the one available when you seek a [employer] virtual visit, the peace of mind you get from getting a real diagnosis real time can be priceless. For more information on Charlie [employer], view our Frequently Asked Questions (FAQs) at [email or web address]. Sincerely, 
 
Frandy Burch MD 
Chief Medical Officer [employer] *:  certain medications cannot be prescribed via [employer] Providers Seen During Your Hospitalization Provider Specialty Primary office phone Nitish Lopez MD Orthopedic Surgery [phone] Your Primary Care Physician (PCP) Primary Care Physician Office Phone Office Fax Camacho Higgins [phone] You are allergic to the following No active allergies Recent Documentation Height Weight BMI OB Status Smoking Status 1.676 m 83.5 kg 29.7 kg/m2 Menopause Never Smoker Emergency Contacts Name Discharge Info Relation Home Work Mobile [facility] DISCHARGE CAREGIVER [3] Sister [23] [phone] Patti Durant DISCHARGE CAREGIVER [3] Child [2] [phone] Patient Belongings The following personal items are in your possession at time of discharge: 
  Dental Appliances: None  Visual Aid: None      Home Medications: None   Jewelry: None  Clothing: Other (comment), Shirt, Undergarments, Footwear (skirt)    Other Valuables: None Discharge Instructions Attachments/References CEPHALEXIN (BY MOUTH) (ENGLISH) RIVAROXABAN (BY MOUTH) (ENGLISH) METHOCARBAMOL (BY MOUTH) (ENGLISH) OXYCODONE/ACETAMINOPHEN (BY MOUTH) (ENGLISH) GABAPENTIN (BY MOUTH) (ENGLISH) Patient Handouts Cephalexin (By mouth) Cephalexin (wua-d-FXV-in) Treats infections. This medicine is a cephalosporin antibiotic. Brand Name(s): Erik Islas There may be other brand names for this medicine. When This Medicine Should Not Be Used: This medicine is not right for everyone. Do not use this medicine if you had an allergic reaction to cephalexin or another cephalosporin medicine. How to Use This Medicine:  
Capsule, Tablet, Tablet for Suspension, Liquid · Your doctor will tell you how much medicine to use. Do not use more than directed. · Read and follow the patient instructions that come with this medicine. Talk to your doctor or pharmacist if you have any questions. · You may take your medicine with food or milk to avoid stomach upset. · Oral liquid: Shake well just before use. Measure the oral liquid medicine with a marked measuring spoon, oral syringe, or medicine cup. · Take all of the medicine in your prescription to clear up your infection, even if you feel better after the first few doses. · Missed dose: Take a dose as soon as you remember.  If it is almost time for your next dose, wait until then and take a regular dose. Do not take extra medicine to make up for a missed dose. · Capsule or tablet: Store at room temperature away from heat, moisture, and direct light. · Oral liquid: Store in the refrigerator for 14 days. After 14 days, throw away any unused medicine. Do not freeze. Drugs and Foods to Avoid: Ask your doctor or pharmacist before using any other medicine, including over-the-counter medicines, vitamins, and herbal products. · Some medicines and foods can affect how cephalexin works. Tell your doctor if you are also using ¨ Metformin ¨ Probenecid Warnings While Using This Medicine: · Tell your doctor if you are pregnant or breastfeeding, or if you have kidney disease, liver disease, or a history of digestive problems, such as colitis. Tell your doctor if you are allergic to penicillin. · This medicine can cause diarrhea. Call your doctor if the diarrhea becomes severe, does not stop, or is bloody. Do not take any medicine to stop diarrhea until you have talked to your doctor. Diarrhea can occur 2 months or more after you stop taking this medicine. · Tell any doctor or dentist who treats you that you are using this medicine. This medicine may affect certain medical test results. · Call your doctor if your symptoms do not improve or if they get worse. · Keep all medicine out of the reach of children. Never share your medicine with anyone. Possible Side Effects While Using This Medicine:  
Call your doctor right away if you notice any of these side effects: · Allergic reaction: Itching or hives, swelling in your face or hands, swelling or tingling in your mouth or throat, chest tightness, trouble breathing · Blistering, peeling, red skin rash · Severe diarrhea, especially if bloody or ongoing · Severe stomach pain, vomiting If you notice these less serious side effects, talk with your doctor: · Mild diarrhea or nausea If you notice other side effects that you think are caused by this medicine, tell your doctor. Call your doctor for medical advice about side effects. You may report side effects to FDA at 3-962-TLZ-3880 © 2017 [address] Information is for End User's use only and may not be sold, redistributed or otherwise used for commercial purposes. The above information is an  only. It is not intended as medical advice for individual conditions or treatments. Talk to your doctor, nurse or pharmacist before following any medical regimen to see if it is safe and effective for you. Rivaroxaban (By mouth) Rivaroxaban (ujv-k-ABK-a-ban) Treats and prevents blood clots, which lowers the risk of stroke, deep vein thrombosis (DVT), pulmonary embolism (PE), and similar conditions. This medicine is a blood thinner. Brand Name(s): Xarelto, Xarelto Starter Pack There may be other brand names for this medicine. When This Medicine Should Not Be Used: This medicine is not right for everyone. Do not use it if you had an allergic reaction to rivaroxaban, or you have severe bleeding. How to Use This Medicine:  
Tablet · Take this medicine as directed, and take it at the same time each day. · 10-milligram (mg) tablet: Take with or without food. · 15-mg or 20-mg tablet: Take with food. · If you cannot swallow the tablets, you may crush the tablet and mix it with applesauce. Eat some food after you swallow the mixture. · Tube feeding: You may crush the tablet and mix the medicine in 50 milliliters (mL) of water before giving it via the tube. This must be followed by a feeding. · This medicine should come with a Medication Guide. Ask your pharmacist for a copy if you do not have one. · Missed dose: ¨ Ask your doctor or pharmacist if you are not sure what to do if you miss a dose. ¨ Once-daily dose:  If you miss a dose or forget to use your medicine, use it as soon as you can on the same day. Do not use extra medicine to make up for a missed dose. ¨ Twice-daily dose to treat a blood clot (15-mg tablet): If you miss a dose or forget to use your medicine, use it as soon as you can on the same day. You may take 2 doses at the same time to make up for the missed dose. This is only for people who take a total of 30 mg per day. · Store the medicine in a closed container at room temperature, away from heat, moisture, and direct light. Drugs and Foods to Avoid: Ask your doctor or pharmacist before using any other medicine, including over-the-counter medicines, vitamins, and herbal products. · Some foods and medicines can affect how rivaroxaban works. Tell your doctor if you are using any of the following: ¨ NSAID medicine (including aspirin, celecoxib, diclofenac, ibuprofen, naproxen) ¨ Ketoconazole, itraconazole, lopinavir, ritonavir, indinavir, conivaptan, carbamazepine, phenytoin, rifampin, Nina's wort ¨ Another blood thinner (including clopidogrel, enoxaparin, heparin, warfarin) Warnings While Using This Medicine: · Tell your doctor if you are pregnant or breastfeeding, or if you have kidney disease, liver disease, bleeding problems, or an artificial heart valve. · This medicine may increase your risk of bleeding. Be careful to avoid injuries that could cause bleeding. Stay away from rough sports or other situations where you could be bruised, cut, or hurt. Brush and floss your teeth gently. Be careful when using sharp objects, including razors and fingernail clippers. Avoid picking your nose. If you need to blow your nose, blow it gently. · This medicine may cause nerve damage if you have a medical procedure done to your back, including anesthesia or a spinal puncture. This is more likely to happen if you have a history of back injury, back surgery, problems with your spine, or procedures or punctures to your back.  Tell your doctor if you are also taking another blood thinner, because this also increases the risk. · Do not stop using this medicine suddenly without asking your doctor. You might have a higher risk of stroke for a short time after you stop using this medicine. · Tell any doctor or dentist who treats you that you are using this medicine. · Your doctor will do lab tests at regular visits to check on the effects of this medicine. Keep all appointments. · Keep all medicine out of the reach of children. Never share your medicine with anyone. Possible Side Effects While Using This Medicine:  
Call your doctor right away if you notice any of these side effects: · Allergic reaction: Itching or hives, swelling in your face or hands, swelling or tingling in your mouth or throat, chest tightness, trouble breathing · Blistering, peeling, or red skin rash · Decrease in how much or how often you urinate · Heavy menstrual bleeding, or vaginal bleeding · Red or brown urine, bloody or black, tarry stools · Unusual bleeding or bruising, including frequent nosebleeds · Vomiting blood or material that looks like coffee grounds If you notice other side effects that you think are caused by this medicine, tell your doctor. Call your doctor for medical advice about side effects. You may report side effects to FDA at 0-752-FDA-4415 © 2017 [facility] Information is for End User's use only and may not be sold, redistributed or otherwise used for commercial purposes. The above information is an  only. It is not intended as medical advice for individual conditions or treatments. Talk to your doctor, nurse or pharmacist before following any medical regimen to see if it is safe and effective for you. Methocarbamol (By mouth) Methocarbamol (meth-oh-CHRISTIANNE-ba-mol) Treats muscle pain and spasms. Brand Name(s): Robaxin, Robaxin-750 There may be other brand names for this medicine. When This Medicine Should Not Be Used: This medicine is not right for everyone. Do not use it if you had an allergic reaction to methocarbamol. How to Use This Medicine:  
Tablet · Take your medicine as directed. Your dose may need to be changed several times to find what works best for you. · Missed dose: Take a dose as soon as you remember. If it is almost time for your next dose, wait until then and take a regular dose. Do not take extra medicine to make up for a missed dose. · Store the medicine in a closed container at room temperature, away from heat, moisture, and direct light. Drugs and Foods to Avoid: Ask your doctor or pharmacist before using any other medicine, including over-the-counter medicines, vitamins, and herbal products. · Some medicines can affect how methocarbamol works. Tell your doctor if you are using pyridostigmine. · Do not drink alcohol while you are using this medicine. · Tell your doctor if you use anything else that makes you sleepy. Some examples are allergy medicine, narcotic pain medicine, and alcohol. Warnings While Using This Medicine: · Tell your doctor if you are pregnant or breastfeeding, or if you have kidney disease, liver disease, or myasthenia gravis. · This medicine may make you dizzy or drowsy. Do not drive or do anything that could be dangerous until you know how this medicine affects you. · Tell any doctor or dentist who treats you that you are using this medicine. This medicine may affect certain medical test results. · Your doctor will check your progress and the effects of this medicine at regular visits. Keep all appointments. · Keep all medicine out of the reach of children. Never share your medicine with anyone. Possible Side Effects While Using This Medicine:  
Call your doctor right away if you notice any of these side effects: · Allergic reaction: Itching or hives, swelling in your face or hands, swelling or tingling in your mouth or throat, chest tightness, trouble breathing · Blurred vision, redness, pain, or swelling of the eyes · Dark urine or pale stools, nausea, vomiting, loss of appetite, stomach pain, yellow skin or eyes · Fever · Lightheadedness, dizziness, fainting · Seizures · Slow heartbeat · Unusual bleeding, bruising, or weakness If you notice these less serious side effects, talk with your doctor: · Confusion, trouble sleeping · Headache · Warmth or redness in your face, neck, arms, or upper chest 
If you notice other side effects that you think are caused by this medicine, tell your doctor. Call your doctor for medical advice about side effects. You may report side effects to FDA at 1-137-LJD-9781 © 2017 [facility] Information is for End User's use only and may not be sold, redistributed or otherwise used for commercial purposes. The above information is an  only. It is not intended as medical advice for individual conditions or treatments. Talk to your doctor, nurse or pharmacist before following any medical regimen to see if it is safe and effective for you. Oxycodone/Acetaminophen (By mouth) Acetaminophen (o-kuqx-x-MIN-oh-fen), Oxycodone Hydrochloride (kh-r-PDQ-done kervin-droe-KLOR-anna) Treats moderate to moderately severe pain. This medicine is a narcotic pain reliever. Brand Name(s): Endocet, Percocet, Primlev, Xartemis XR There may be other brand names for this medicine. When This Medicine Should Not Be Used: This medicine is not right for everyone. Do not use it if you had an allergic reaction to acetaminophen or oxycodone, or if you have serious breathing problems or paralytic ileus. How to Use This Medicine:  
Capsule, Liquid, Tablet, Long Acting Tablet · Your doctor will tell you how much medicine to use. Do not use more than directed. · An overdose can be dangerous.  Follow directions carefully so you do not get too much medicine at one time. · Oral liquid: Measure the oral liquid medicine with a marked measuring spoon, oral syringe, or medicine cup. · Swallow the extended-release tablet whole. Do not crush, break, or chew it. Do not lick or wet the tablet before placing it in your mouth. Do not give this medicine through a feeding tube. · This medicine should come with a Medication Guide. Ask your pharmacist for a copy if you do not have one. · Missed dose: If you miss a dose of this medicine, skip the missed dose and go back to your regular dosing schedule. Do not double doses. · Store the medicine in a closed container at room temperature, away from heat, moisture, and direct light. Ask your pharmacist about the best way to dispose of medicine you do not use. Drugs and Foods to Avoid: Ask your doctor or pharmacist before using any other medicine, including over-the-counter medicines, vitamins, and herbal products. · Do not use Xartemis XR if you are using or have used an MAO inhibitor in the past 14 days. · Some medicines can affect how this medicine works. Tell your doctor if you are using any of the following: ¨ Carbamazepine, erythromycin, ketoconazole, lamotrigine, mirtazapine, naltrexone, phenytoin, propranolol, rifampin, ritonavir, tramadol, trazodone, or zidovudine ¨ Birth control pills ¨ Diuretic (water pill) ¨ Medicine to treat depression ¨ Phenothiazine medicine ¨ Triptan medicine to treat migraine headaches · Do not drink alcohol while you are using this medicine. Acetaminophen can damage your liver, and alcohol can increase this risk. Do not take acetaminophen without asking your doctor if you have 3 or more drinks of alcohol every day. · Tell your doctor if you use anything else that makes you sleepy. Some examples are allergy medicine, narcotic pain medicine, and alcohol.  Tell your doctor if you are using buprenorphine, butorphanol, nalbuphine, pentazocine, a benzodiazepine, or a muscle relaxer. Warnings While Using This Medicine: · Tell your doctor if you are pregnant or breastfeeding, or if you have kidney disease, liver disease, heart disease, low blood pressure, breathing problems or lung disease (such as asthma, COPD), thyroid problems, [place] disease, pancreas or gallbladder problems, prostate problems, trouble urinating, or a stomach problems, or a history of head injury or brain damage, seizures, or alcohol or drug abuse. Tell your doctor if you are allergic to codeine. · This medicine may cause the following problems: 
¨ High risk of overdose, which can lead to death ¨ Respiratory depression (serious breathing problem that can be life-threatening) ¨ Liver problems ¨ Serious skin reactions ¨ Serotonin syndrome (when used with certain medicines) · This medicine may make you dizzy or drowsy. Do not drive or do anything that could be dangerous until you know how this medicine affects you. Sit or lie down if you feel dizzy. Stand up carefully. · This medicine contains acetaminophen. Read the labels of all other medicines you are using to see if they also contain acetaminophen, or ask your doctor or pharmacist. Alexandra Holman not use more than 4 grams (4,000 milligrams) total of acetaminophen in one day. · This medicine can be habit-forming. Do not use more than your prescribed dose. Call your doctor if you think your medicine is not working. · Do not stop using this medicine suddenly. Your doctor will need to slowly decrease your dose before you stop it completely. · This medicine could cause infertility. Talk with your doctor before using this medicine if you plan to have children. · This medicine may cause constipation, especially with long-term use. Ask your doctor if you should use a laxative to prevent and treat constipation. · Keep all medicine out of the reach of children. Never share your medicine with anyone. Possible Side Effects While Using This Medicine:  
Call your doctor right away if you notice any of these side effects: · Allergic reaction: Itching or hives, swelling in your face or hands, swelling or tingling in your mouth or throat, chest tightness, trouble breathing · Anxiety, restlessness, fast heartbeat, fever, muscle spasms, twitching, diarrhea, seeing or hearing things that are not there · Blistering, peeling, red skin rash · Blue lips, fingernails, or skin · Dark urine or pale stools, loss of appetite, stomach pain, yellow skin or eyes · Extreme weakness, shallow breathing, uneven heartbeat, seizures, sweating, or cold or clammy skin · Severe confusion, lightheadedness, dizziness, or fainting · Severe constipation, nausea, or vomiting · Trouble breathing or slow breathing If you notice these less serious side effects, talk with your doctor:  
· Headache · Mild constipation, nausea, or vomiting · Mild sleepiness or drowsiness If you notice other side effects that you think are caused by this medicine, tell your doctor. Call your doctor for medical advice about side effects. You may report side effects to FDA at 6-086-FDA-6497 © 2017 [address] Information is for End User's use only and may not be sold, redistributed or otherwise used for commercial purposes. The above information is an  only. It is not intended as medical advice for individual conditions or treatments. Talk to your doctor, nurse or pharmacist before following any medical regimen to see if it is safe and effective for you. Gabapentin (By mouth) Gabapentin (ceasar-a-PEN-tin) Treats seizures and pain caused by shingles. Brand Name(s): ACTIVE-PAC with Gabapentin, Convenience Benja, Cyclo/Sabino 10/300 Pack, [employer], Sabino-V, Gralise, Gralise Starter Pack, Neurontin, [employer] Kit There may be other brand names for this medicine. When This Medicine Should Not Be Used: This medicine is not right for everyone. Do not use it if you had an allergic reaction to gabapentin. How to Use This Medicine:  
Capsule, Liquid, Tablet · Take your medicine as directed. Your dose may need to be changed several times to find what works best for you. If you have epilepsy, do not allow more than 12 hours to pass between doses. · Capsule: Swallow the capsule whole with plenty of water. Do not open, crush, or chew it. · Gralise® tablet: Swallow the tablet whole . Do not crush, break, or chew it. · Neurontin® tablet: If you break a tablet into 2 pieces, use the second half as your next dose. If you don't use it within 28 days, throw it away. · Measure the oral liquid medicine with a marked measuring spoon, oral syringe, or medicine cup. · This medicine should come with a Medication Guide. Ask your pharmacist for a copy if you do not have one. · Missed dose: Take a dose as soon as you remember. If it is almost time for your next dose, wait until then and take a regular dose. Do not take extra medicine to make up for a missed dose. · Store the medicine in a closed container at room temperature, away from heat, moisture, and direct light. Store the Neurontin® oral liquid in the refrigerator. Do not freeze. Drugs and Foods to Avoid: Ask your doctor or pharmacist before using any other medicine, including over-the-counter medicines, vitamins, and herbal products. · Some medicines can affect how gabapentin works. Tell your doctor if you also use any of the following: ¨ Hydrocodone ¨ Morphine · If you take an antacid, wait at least 2 hours before you take gabapentin. · Tell your doctor if you use anything else that makes you sleepy. Some examples are allergy medicine, narcotic pain medicine, and alcohol. Warnings While Using This Medicine: · Tell your doctor if you are pregnant or breastfeeding, or if you have kidney problems or are receiving dialysis.  Tell your doctor if you have a history of depression or mental health problems. · This medicine may increase depression or thoughts of suicide. Tell your doctor right away if you start to feel more depressed or think about hurting yourself. · This medicine may cause a serious allergic reaction called multiorgan hypersensitivity, which can damage organs and be life-threatening. · Do not stop using this medicine suddenly. Your doctor will need to slowly decrease your dose before you stop it completely. If you take this medicine to prevent seizures, your seizures may return or occur more often if you stop this medicine suddenly. · This medicine may make you dizzy or drowsy. Do not drive or do anything else that could be dangerous until you know how this medicine affects you. · Tell any doctor or dentist who treats you that you are using this medicine. This medicine may affect certain medical test results. · Your doctor will check your progress and the effects of this medicine at regular visits. Keep all appointments. · Keep all medicine out of the reach of children. Never share your medicine with anyone. Possible Side Effects While Using This Medicine:  
Call your doctor right away if you notice any of these side effects: · Allergic reaction: Itching or hives, swelling in your face or hands, swelling or tingling in your mouth or throat, chest tightness, trouble breathing · Behavior problems, aggression, restlessness, trouble concentrating, moodiness (especially in children) · Blistering, peeling, red skin rash · Change in how much or how often you urinate, bloody or cloudy urine, 
· Chest pain, fast heartbeat, trouble breathing · Dark urine or pale stools, nausea, vomiting, loss of appetite, stomach pain, yellow skin or eyes · Fever, rash, swollen or tender glands in the neck, armpit, or groin · Problems with coordination, shakiness, unsteadiness · Rapid weight gain, swelling in your hands, ankles, or feet · Unusual moods or behaviors, thoughts of hurting yourself, feeling depressed If you notice these less serious side effects, talk with your doctor: · Dizziness, drowsiness, sleepiness, tiredness If you notice other side effects that you think are caused by this medicine, tell your doctor. Call your doctor for medical advice about side effects. You may report side effects to FDA at 4-471-FDA-5450 © 2017 [facility] Information is for End User's use only and may not be sold, redistributed or otherwise used for commercial purposes. The above information is an  only. It is not intended as medical advice for individual conditions or treatments. Talk to your doctor, nurse or pharmacist before following any medical regimen to see if it is safe and effective for you. Please provide this summary of care documentation to your next provider. Signatures-by signing, you are acknowledging that this After Visit Summary has been reviewed with you and you have received a copy. Patient Signature:  ____________________________________________________________ Date:  ____________________________________________________________  
  
Valencia Abbott Provider Signature:  ____________________________________________________________ Date:  ____________________________________________________________

## 2018-06-06 NOTE — ANESTHESIA PREPROCEDURE EVALUATION
Anesthetic History   No history of anesthetic complications            Review of Systems / Medical History  Patient summary reviewed and pertinent labs reviewed    Pulmonary  Within defined limits                 Neuro/Psych   Within defined limits           Cardiovascular    Hypertension: well controlled          Hyperlipidemia    Exercise tolerance: >4 METS     GI/Hepatic/Renal  Within defined limits              Endo/Other        Arthritis     Other Findings   Comments: Documentation of current medication  Current medications obtained, documented and obtained? YES      Risk Factors for Postoperative nausea/vomiting:       History of postoperative nausea/vomiting? NO       Female? YES       Motion sickness? NO       Intended opioid administration for postoperative analgesia? YES      Smoking Abstinence:  Current Smoker? NO  Elective Surgery? YES  Seen preoperatively by anesthesiologist or proxy prior to day of surgery? YES  Pt abstained from smoking 24 hours prior to anesthesia?  YES    Preventive care/screening for High Blood Pressure:  Aged 18 years and older: YES  Screened for high blood pressure: YES  Patients with high blood pressure referred to primary care provider   for BP management: YES                 Physical Exam    Airway  Mallampati: I  TM Distance: > 6 cm  Neck ROM: normal range of motion   Mouth opening: Normal     Cardiovascular  Regular rate and rhythm,  S1 and S2 normal,  no murmur, click, rub, or gallop             Dental  No notable dental hx       Pulmonary  Breath sounds clear to auscultation               Abdominal  GI exam deferred       Other Findings            Anesthetic Plan    ASA: 2  Anesthesia type: general      Post-op pain plan if not by surgeon: peripheral nerve block single    Induction: Intravenous  Anesthetic plan and risks discussed with: Patient

## 2018-06-06 NOTE — ROUTINE PROCESS
Bedside and Verbal shift change report [facility] by Wilbert Mccoy (offgoing nurse). Report included the following information SBAR, Kardex, Intake/Output and MAR.

## 2018-06-06 NOTE — PROGRESS NOTES
Problem: Mobility Impaired (Adult and Pediatric)  Goal: *Acute Goals and Plan of Care (Insert Text)  Physical Therapy Goals  Initiated 6/6/2018 and to be accomplished within 7 day(s)  1. Patient will move from supine to sit and sit to supine , scoot up and down and roll side to side in bed with supervision/set-up. 2.  Patient will transfer from bed to chair and chair to bed with supervision/set-up using the least restrictive device. 3.  Patient will perform sit to stand with supervision/set-up. 4.  Patient will ambulate with supervision/set-up for 150 feet with the least restrictive device. 5.  Patient will ascend/descend 10 stairs with 1 handrail(s) with minimal assistance/contact guard assist.    Outcome: Progressing Towards Goal  physical Therapy EVALUATION    Patient: Som Fowler (84 y.o. female)  Date: 6/6/2018  Primary Diagnosis: Osteoarthritis of right knee, unspecified osteoarthritis type [M17.11]  Procedure(s) (LRB):  RIGHT TOTAL KNEE ARTHROPLASTY/C-ARM/DEPUY ATTUNE/2 SA'S/ADDUCTOR BLOCK (Right) Day of Surgery   Precautions:   Fall, WBAT    ASSESSMENT :  PT orders received and patient cleared by nursing to participate with therapy. Patient is a 62 y.o. female admitted to the hospital due to s/p R TKA Dr. Daren Shelton 6/6/18. Patient consents to PT evaluation and treatment. Pt seen with OT for increased mobility and safety. Performed and educated pt on supine therex. Pt has good strength with SLR without KI donned. Educated pt on towel roll under ankle only and OOB 3-5 times a day with nursing assistance. Pt complained of nausea throughout session and dizziness with standing. BP at end of session 153/89. Nursing informed about symptoms. Pt performed supine to sit CGA and sit to supine min A. Pt performed sit to stand transfers CGA. Ambulated 3 feet toward HOB without knee buckling with RW CGA. Pt ended session in supine with ice donned and towel roll under ankle with all needs met.  PT called to  Darya An via telephone to give an update. Pt is not safe for d/c home tonight due to nausea and inability to progress mobility at this time. Pt tonight will continue to mobilize with nursing to and from bathroom and [facility] pending symptoms. PT will see pt in the morning to continue progressing pt with mobility for safe d/c home. Patient will benefit from skilled intervention to address the above impairments and increase functional independence. Patients rehabilitation potential is considered to be Good  Factors which may influence rehabilitation potential include:   []         None noted  []         Mental ability/status  []         Medical condition  []         Home/family situation and support systems  []         Safety awareness  []         Pain tolerance/management  []         Other:      PLAN :  Recommendations and Planned Interventions:  [x]           Bed Mobility Training             [x]    Neuromuscular Re-Education  [x]           Transfer Training                   []    Orthotic/Prosthetic Training  [x]           Gait Training                          []    Modalities  [x]           Therapeutic Exercises          []    Edema Management/Control  [x]           Therapeutic Activities            [x]    Patient and Family Training/Education  []           Other (comment):    Frequency/Duration: Patient will be followed by physical therapy 1-2 times per day to address goals. Discharge Recommendations: Home Health  Further Equipment Recommendations for Discharge: Pt has a RW and BSC ordered but not delivered     SUBJECTIVE:   Patient stated I feel sick.     OBJECTIVE DATA SUMMARY:     Past Medical History:   Diagnosis Date    Arthritis      Past Surgical History:   Procedure Laterality Date    HX KNEE ARTHROSCOPY       Barriers to Learning/Limitations: yes;  altered mental status (i.e.Sedation, Confusion)  Compensate with: Visual Cues, Verbal Cues and Tactile Cues  Prior Level of Function/Home Situation: Independent with mobility including gait no AD. Home Situation  Home Environment: Private residence  # Steps to Enter: 1  One/Two Story Residence: Two story  # of Interior Steps: 15  Interior Rails: Right  Living Alone: No  Support Systems: Family member(s)  Patient Expects to be Discharged to[de-identified] Private residence  Current DME Used/Available at Home: Shower chair  Tub or Shower Type: Shower (with built in seat)  Critical Behavior:  Neurologic State: Alert  Psychosocial  Patient Behaviors: Calm; Cooperative  Family  Behaviors: Calm;Supportive  Strength:    Strength:  (B LE R 3 to 3+/5, L 4+ to 5/5)  Tone & Sensation:   Tone: Normal (B LE)  Sensation: Intact (B LE)   Range Of Motion:  AROM:  (B LE WFL except R knee 5-90 degrees)  Functional Mobility:  Bed Mobility:  Supine to Sit: Contact guard assistance  Sit to Supine: Minimum assistance  Scooting: Stand-by assistance  Transfers:  Sit to Stand: Contact guard assistance  Stand to Sit: Contact guard assistance  Balance:   Sitting: Intact  Standing: Impaired; With support  Standing - Static: Fair  Standing - Dynamic : Fair  Ambulation/Gait Training:  Distance (ft): 3 Feet (ft) (lateral [facility] )  Assistive Device: Walker, rolling  Ambulation - Level of Assistance: Contact guard assistance;Minimal assistance  Base of Support: Shift to left  Speed/Aditi: Slow;Shuffled  Therapeutic Exercises: Ankle pumps, SLR, and heel slides x 10 reps  Pain:  Pre: 8/10 R knee  Post: 8/10 R knee  Activity Tolerance:   poor  Please refer to the flowsheet for vital signs taken during this treatment.   After treatment:   [] Patient left in no apparent distress sitting up in chair  [] Patient left sitting on EOB  [x] Patient left in no apparent distress in bed  [] Patient declined to be OOB at this time due to    [x] Call bell left within reach  [x] Nursing notified(Cris)  [x] Caregiver present  [] Bed alarm activated  [x] Personal items in reach     COMMUNICATION/EDUCATION:   [x] Fall prevention education was provided and the patient/caregiver indicated understanding. [x]         Patient/family have participated as able in goal setting and plan of care. [x]         Patient/family agree to work toward stated goals and plan of care. []         Patient understands intent and goals of therapy, but is neutral about his/her participation. []         Patient is unable to participate in goal setting and plan of care. Thank you for this referral.  Norbert Marmolejo, PT, DPT   Time Calculation: 25 mins      Mobility  Current  CJ= 20-39%   Goal  CI= 1-19%. The severity rating is based on the Level of Assistance required for Functional Mobility and ADLs.     Eval Complexity: History: LOW Complexity : Zero comorbidities / personal factors that will impact the outcome / POCExam:HIGH Complexity : 4+ Standardized tests and measures addressing body structure, function, activity limitation and / or participation in recreation  Presentation: LOW Complexity : Stable, uncomplicated  Clinical Decision Making:Low Complexity   Overall Complexity:LOW

## 2018-06-06 NOTE — H&P (VIEW-ONLY)
[address] [place] [address]  [phone]           HISTORY & PHYSICAL      Patient: Penny Bustillos                MRN: [id number]       SSN: [id number]  YOB: 1960        AGE: 62 y.o. SEX: female  Body mass index is 30.02 kg/(m^2). PCP: Susan Galicia MD  05/31/18      CC: Right  knee end stage OA  Problem List Items Addressed This Visit     None      Visit Diagnoses     Primary osteoarthritis of right knee    -  Primary    Pre-op exam                HPI:  The patient is a pleasant 62 y.o. whom has end stage OA of their Right knee and has failed conservative treatment including but not limited to NSAIDS, cortisone injections, viscosupplementation, PT, and pain medicine. Due to the current findings and affected activities of daily living, surgical intervention is indicated. The alternatives, risks, complications, as well as expected outcome were discussed. These include but are not limited to infection, blood loss, need for blood transfusion, neurovascular damage, DVT, PE,  post-op stiffness and pain, leg length discrepancy, dislocation, anesthetic complications, prothesis longevity, need for more surgery, MI, stroke, and even death. The patient understands and wishes to proceed with surgery.       Past Medical History:   Diagnosis Date    Arthritis          Current Outpatient Prescriptions:     methylPREDNISolone (MEDROL DOSEPACK) 4 mg tablet, Per dose pack instructions, Disp: 21 Dose Pack, Rfl: 0    aspirin delayed-release 81 mg tablet, take 1 tablet by mouth once daily, Disp: , Rfl: 0    VITAMIN D2 50,000 unit capsule, take 1 capsule by mouth every week, Disp: , Rfl: 0    hydroCHLOROthiazide (HYDRODIURIL) 25 mg tablet, take 1 tablet by mouth once daily, Disp: , Rfl: 0    Omeprazole delayed release (PRILOSEC D/R) 20 mg tablet, take 1 tablet by mouth once daily, Disp: , Rfl: 0    simvastatin (ZOCOR) 20 mg tablet, take 1 tablet by mouth at bedtime, Disp: , Rfl: 0    naproxen sodium (ALEVE) 220 mg cap, Take  by mouth., Disp: , Rfl:     ibuprofen (ADVIL) 200 mg tablet, Take 200 mg by mouth DIALYSIS PRN for Pain. Indications: Pain, Disp: , Rfl:     traMADol (ULTRAM) 50 mg tablet, Take 1 Tab by mouth every six (6) hours as needed for Pain. Max Daily Amount: 200 mg., Disp: 20 Tab, Rfl: 0    No Known Allergies    Social History     Social History    Marital status: UNKNOWN     Spouse name: N/A    Number of children: N/A    Years of education: N/A     Occupational History    Not on file. Social History Main Topics    Smoking status: Never Smoker    Smokeless tobacco: Never Used    Alcohol use No    Drug use: No    Sexual activity: Not on file     Other Topics Concern    Not on file     Social History Narrative       Past Surgical History:   Procedure Laterality Date    HX KNEE ARTHROSCOPY         Family History:  Non-contributory. PE:  Visit Vitals    /76    Pulse 78    Temp 98.6 °F (37 °C) (Oral)    Resp 16    Ht 5' 6\" (1.676 m)    Wt 186 lb (84.4 kg)    SpO2 99%    BMI 30.02 kg/m2     A&O X3, NAD, well develop, well nourished  Heart: S1-S2, rrr  Lungs: CTA bilat  Abd: soft, nt, nt, + bs in all quadrants  Ext:  Pos distal pulses to DP, PT      X-ray: right knee shows end stage OA    Labs: pending labs and clearances    A:  Right  knee end stage OA    P:  At this point we will move forward with surgery. Again, the alternatives, risks, complications, as well as expected outcome were discussed and the patient wishes to proceed with surgery. Pt has been instructed to stop aspirin, nsaids, rheumatologic medications and blood thinners. They have also been instructed to continue on any heart and bp meds and to take them the morning of surgery with sips of water.    The patient will require in patient admission due to above stated medical conditions as well the the surgical challenges given the anatomy and bone quality.          Leatrice Siemens PA-C Dr. Buck Bookbinder

## 2018-06-06 NOTE — ANESTHESIA POSTPROCEDURE EVALUATION
Post-Anesthesia Evaluation and Assessment    Patient: Jorge Luis Schwarz MRN: [id number]  SSN: [id number]    YOB: 1960  Age: 62 y.o. Sex: female       Cardiovascular Function/Vital Signs  Visit Vitals    /71    Pulse 72    Temp 36.9 °C (98.5 °F)    Resp 17    Ht 5' 6\" (1.676 m)    Wt 83.5 kg (184 lb)    SpO2 97%    BMI 29.7 kg/m2       Patient is status post general, regional anesthesia for Procedure(s):  RIGHT TOTAL KNEE ARTHROPLASTY/C-ARM/DEPUY ATTUNE/2 SA'S/ADDUCTOR BLOCK. Nausea/Vomiting: None    Postoperative hydration reviewed and adequate. Pain:  Pain Scale 1: Numeric (0 - 10) (06/06/18 1316)  Pain Intensity 1: 4 (06/06/18 1316)   Managed    Neurological Status:   Neuro (WDL): Within Defined Limits (06/06/18 1252)   At baseline    Mental Status and Level of Consciousness: Arousable    Pulmonary Status:   O2 Device: Room air (06/06/18 1316)   Adequate oxygenation and airway patent    Complications related to anesthesia: None    Post-anesthesia assessment completed.  No concerns      Signed By: Morales Rushing MD     June 6, 2018

## 2018-06-06 NOTE — PROGRESS NOTES
Rounded on patient post total hip replacement. Activity: Reinforced importance of getting OOB for all meals, going to bathroom to help prevent blood clots. VTE prophylaxis: Instructed patient to use their SCD's when not up and walking. To use while in bed and in the chair. Educated re: ankle pumps to assist with circulation when in hospital and at home. Medications: Reviewed pain medications patient is taking and the importance of keeping pain under control to help with getting OOB and therapy. Reminded the patient to always eat a snack with their pain medication to help to prevent nausea. Encouraged patient to monitor for constipation and to take a stool softner/laxative while recovering and on pain medication. Discussed protein consumption to promote healing  Constipation: Educated patient to take a stool softener and/or mild laxative to prevent constipation while on a narcotic. Patient educated that narcotics and decrease mobility can increase constipation so patient educated to continue to take stool softener and or laxative while on narcotics along with drinking 8 glasses of water a day (unless on fluid restriction). Incentive Spirometry: Reinforced use of incentive spirometer with return demonstration by patient. Wound Care: Dressing to surgical site opsite visible covered by ace wrap and ice machine. Patient instructed not to take dressing off at home. Patient given CHG wash to use in hospital and at home. Stressed importance of using a clean towel and washcloth daily. Reminded to put on clean clothes and night clothes daily. Ice Protocol: Ice in place per protocol. Patient Safety: Call light and personal belongings in reach. in reach. Patient and family reminded to call for help toget OOB or when leaving bathroom for safety.  Phone and other items also within reach per patient's request.     Diet: Educated patient on the importance of eating three well balanced meals a day with protein to promote bone/muscle healing. Reminded patient to drink lots of fluids to protect kidneys from all the medications being taken currently with recovery. Patient given educational material to remind them to continue doing everything at home to prevent complications and have a successful recovery. Patient and family verbalized understand of all information and education discussed. Patient and family given the opportunity for asking questions. Patient did not get into chair with PT due to BP drop and nausea per patient. Patient given bland snacks (crackers, jello, and ginger ale) to put on stomach.

## 2018-06-06 NOTE — OP NOTES
[facility] REPORT    Antionette Barnard  MR#: [id number]  : 1960  ACCOUNT #: [de-identified]   DATE OF SERVICE: 2018    PREOPERATIVE DIAGNOSIS:  Severe arthritis, right knee with genu varus deformity. POSTOPERATIVE DIAGNOSIS:  Severe arthritis, right knee with genu varus deformity. PROCEDURE:  Right total knee arthroplasty with the DePuy Attune system. SURGEON:  Maite Pope MD    ASSISTANT:  Wendy Augustin. ANESTHESIA:  Right adductor canal nerve block and general anesthesia. COMPLICATIONS:  None. ESTIMATED BLOOD LOSS:  75 mL. INTRAOPERATIVE TRANSFUSIONS:  None. DRAINS:  None. COMPLICATIONS:  None. SPECIMENS REMOVED:  Right knee bone    IMPLANTS:  DePuy Attune 5 right posterior stabilized femoral component, size 4 tibial rotating platform tibial tray, size 32 dome patella. Polyethylene is a posterior stabilized rotating platform size 5 x 7 mm thick, for a total of 11 mm thick polyethylene. OPERATIVE COURSE:  Sight, identification, and consent and confirmation were performed in the preoperative holding area. The patient was taken to the operating room, placed a right adductor canal nerve block followed by general anesthesia. Following adequate anesthesia, a time-out and site identification were again performed. A tourniquet was placed on right proximal thigh. Her right knee and leg were prepped in usual sterile manner with her right leg draped free. The right leg and knee were wrapped in a sterile Esmarch bandage. The leg was elevated. The knee was flexed and tourniquet was inflated to 250 mmHg. An anterior longitudinal approach was made to the right knee. Incision was carried through skin and subcutaneous tissue. Bleeders were coagulated with electrocautery. A medial parapatellar incision was used to enter the right knee. Patella was everted, knee was flexed.   She was found to have severe osteoarthritis of the right knee, particularly in the medial compartment and patellofemoral joint. The remaining portion of medial lateral meniscus was anterior cruciate ligament, sharply excised. Care was taken to protect the patellar tendon insertion during the ensuing dissection. The femoral canal was opened with initial ream.  Distal femoral cutting jig was placed on the anterior aspect of the right distal femur, set for 5 degree distal femoral valgus resection. Distal femoral cut was performed and checked for accuracy. Right distal femur sized to a 5 Attune femoral component. #5 cutting block was placed in the right distal femur, set in proper rotation. The anterior flange with posterior femoral condylar cuts were performed. In addition, the anterior and posterior chamfer cuts were made. The external tibia alignment jig was placed on the anterior aspect of the right tibia and set for anatomic posterior slope. A minimal tibial resection performed, taking care to maintain its cut in a neutral varus valgus orientation. Following this was appropriate soft tissue releases. Flexion and extension balance measured. A 10 mm spacer was chosen. With a spacer block, the patient was found to have good clinical alignment of the right knee, it was measured from the hip to the center of the knee and to the ankle with the knee in extension. Flexion and extension balance were satisfactory. The patella clamp was placed around the right patella. The patella was measured. A flat patellar cut was made. Patella was sized to a 32 mm all poly patella. 3 drill holes were made for the patellar post.  The final depth of the patella was within 1 mm of the ridge of patella. The trial was now placed in the right knee. The knee was placed through range of motion. Prior to this, the box was cut out for the posterior stabilized femoral component. This was accomplished with a reciprocating saw.     Following this, the trial components were placed in right knee and the knee was placed through range of motion. She is found to have very good range of motion in right knee from full extension, flexion to 130+ degrees. Clinical alignment was satisfactory, was measured from the hip, through the center of the knee to the ankle with the knee in extension. Flexion and extension balance is satisfactory. Patellar trial was satisfactory. There is no need for lateral retinacular release. The tibia was sized prior to this to a size 4 tibial tray. The drill holes then subsequently made for the tibial stem punch that was used to make the outline for the cruciate portion of the tibial stem. The right knee was now irrigated with copious amounts of saline solution with  irrigant. Meanwhile, antibiotic polymethyl cement was mixed. The final size 32 mm dome poly patella was cemented in place. Following this, a size 4 rotating platform tibial tray was pinned in the right proximal tibia. Following this, a size 5 right posterior stabilized J and J Attune femoral component was cemented on the right distal femur. Cement was allowed to polymerize. Excess cement was removed from the components. The tourniquet was deflated at 65 minutes. There was good capillary refill of the right foot following release of the tourniquet. Bleeders were coagulated with electrocautery. The right knee was again irrigated with copious amounts of saline solution via jet lavage irrigant. The final size 5, 11 mm thick posterior stabilized tibial polyethylene rotating platform was inserted in the tibial tray. The knee was reduced, placed through range of motion. She was found to have very good range of motion of right knee from full extension, flexion to 135+ degrees. Clinically alignment was satisfactory. It was measured from the hip through the center of the knee to the ankle with knee in extension. Flexion and extension balance were satisfactory.   Patellar tracking was satisfactory. There is no need for lateral retinacular release. The extensor mechanism was reapproximated with figure-of-eight #1 Vicryl sutures. Subcutaneous tissue was closed with 3-0 Vicryl suture. Skin was closed with staples. Dry sterile dressings applied. She was awakened in the operating room, extubated, and taken to recovery in stable condition. Sponge and needle counts correct x2 following the procedure. Estimated blood loss approximately 75 mL. INTRAOPERATIVE TRANSFUSIONS:  None. Neurovascular status intact in the right foot following the procedure.       MD YUSUF Ghotra / Kris Smart  D: 06/06/2018 12:24     T: 06/06/2018 13:39  JOB #: [id number]

## 2018-06-06 NOTE — PERIOP NOTES
TRANSFER - OUT REPORT:    Verbal report given to Dale Shea RN(name) on Gallito Bush  being transferred to [address] [place] [address](unit) for routine post - op       Report consisted of patients Situation, Background, Assessment and   Recommendations(SBAR). Information from the following report(s) OR Summary, Intake/Output and MAR was reviewed with the receiving nurse. Lines:   Peripheral IV 06/06/18 Left Arm (Active)   Site Assessment Clean, dry, & intact 6/6/2018 12:52 PM   Phlebitis Assessment 0 6/6/2018 12:52 PM   Infiltration Assessment 0 6/6/2018 12:52 PM   Dressing Status Clean, dry, & intact 6/6/2018 12:52 PM   Dressing Type Transparent;Tape 6/6/2018 12:52 PM   Hub Color/Line Status Pink; Infusing 6/6/2018 12:52 PM        Opportunity for questions and clarification was provided.       Patient transported with:   [employer]

## 2018-06-06 NOTE — INTERVAL H&P NOTE
H&P Update:  Angel Reyes was seen and examined. History and physical has been reviewed. The patient has been examined.  There have been no significant clinical changes since the completion of the originally dated History and Physical.    Signed By: Colleen Hamman, MD     June 6, 2018 9:47 AM

## 2018-06-07 ENCOUNTER — HOME HEALTH ADMISSION (OUTPATIENT)
Dept: HOME HEALTH SERVICES | Facility: HOME HEALTH | Age: 58
End: 2018-06-07
Payer: COMMERCIAL

## 2018-06-07 VITALS
HEIGHT: 66 IN | BODY MASS INDEX: 29.57 KG/M2 | OXYGEN SATURATION: 96 % | RESPIRATION RATE: 18 BRPM | DIASTOLIC BLOOD PRESSURE: 74 MMHG | WEIGHT: 184 LBS | SYSTOLIC BLOOD PRESSURE: 127 MMHG | TEMPERATURE: 98.4 F | HEART RATE: 82 BPM

## 2018-06-07 PROCEDURE — 74011250636 HC RX REV CODE- 250/636: Performed by: ORTHOPAEDIC SURGERY

## 2018-06-07 PROCEDURE — 74011250637 HC RX REV CODE- 250/637: Performed by: ORTHOPAEDIC SURGERY

## 2018-06-07 PROCEDURE — 97530 THERAPEUTIC ACTIVITIES: CPT

## 2018-06-07 PROCEDURE — 97116 GAIT TRAINING THERAPY: CPT

## 2018-06-07 RX ADMIN — PANTOPRAZOLE SODIUM 40 MG: 40 TABLET, DELAYED RELEASE ORAL at 16:25

## 2018-06-07 RX ADMIN — RIVAROXABAN 10 MG: 10 TABLET, FILM COATED ORAL at 16:24

## 2018-06-07 RX ADMIN — Medication 10 ML: at 13:49

## 2018-06-07 RX ADMIN — OXYCODONE HYDROCHLORIDE AND ACETAMINOPHEN 1 TABLET: 5; 325 TABLET ORAL at 06:20

## 2018-06-07 RX ADMIN — PANTOPRAZOLE SODIUM 40 MG: 40 TABLET, DELAYED RELEASE ORAL at 05:08

## 2018-06-07 RX ADMIN — Medication 10 ML: at 05:09

## 2018-06-07 RX ADMIN — CEFAZOLIN SODIUM 2 G: 2 SOLUTION INTRAVENOUS at 01:55

## 2018-06-07 RX ADMIN — OXYCODONE HYDROCHLORIDE AND ACETAMINOPHEN 1 TABLET: 5; 325 TABLET ORAL at 01:55

## 2018-06-07 RX ADMIN — ONDANSETRON HYDROCHLORIDE 4 MG: 2 INJECTION INTRAMUSCULAR; INTRAVENOUS at 08:14

## 2018-06-07 NOTE — PROGRESS NOTES
Patient is safe for home mobility and would recommend home health PT with RW, BSC, and transfer bench. Full note to follow. Thank you for this referral. Edmond Sher, PT, DPT.

## 2018-06-07 NOTE — PROGRESS NOTES
Rounded on patient post total knee replacement. Activity: Reinforced importance of getting OOB for all meals, going to bathroom to help prevent blood clots. VTE prophylaxis: Instructed patient to use their SCD's when not up and walking. To use while in bed and in the chair. Educated re: ankle pumps to assist with circulation when in hospital and at home. Medications: Reviewed pain medications patient is taking and the importance of keeping pain under control to help with getting OOB and therapy. Reminded the patient to always eat a snack with their pain medication to help to prevent nausea. Encouraged patient to monitor for constipation and to take a stool softner/laxative while recovering and on pain medication. Incentive Spirometry: Reinforced use of incentive spirometer with return demonstration by patient. Wound Care: Dressing to surgical site intact with dry ace wrap. Patient instructed not to take dressing off at home. Patient given CHG wash to use in hospital and at home. Stressed importance of using a clean towel and washcloth daily. Reminded to put on clean clothes and night clothes daily. Ice Protocol: Ice man machine in place per protocol. Patient Safety: Call light and personal belongings in reach. Patient  reminded to call for help toget OOB or when leaving bathroom for safety. Phone and other items also within reach per patient's request.     Diet: Educated patient on the importance of eating three well balanced meals a day with protein to promote bone/muscle healing. Reminded patient to drink lots of fluids to protect kidneys from all the medications being taken currently with recovery. Patient given educational material to remind them to continue doing everything at home to prevent complications and have a successful recovery. Patient  verbalized understand of all information and education discussed. Patient  given the opportunity for asking questions.     Mobility Intervention:       [] Pt dangled at edge of bed    [] Pt assisted OOB to bedside commode    [x] Pt assisted OOB to chair    [] Pt ambulated to bathroom    [] Patient was ambulated in room/hallway    Assistive Device Utilized:       [x] Rolling walker   [] Crutches   [] Straight Cane   [] Knee immobilizer   [] IV pole    After Mobilization:     [x] Patient left in no apparent distress sitting up in chair  [] Patient left in no apparent distress in bed  [x] Call bell left within reach  [x] SCDs on & machine turned on  [x] Ice applied  [] RN notified  [] Caregiver present  [] Bed alarm activated    Reason patient not mobilized:      [] Patient refused   [] Nausea/vomiting   [] Low blood pressure   [] Drowsy/lethargic    Pain Rating:     Left patient with call light, cell phone and personal belongings in reach for safety.

## 2018-06-07 NOTE — ROUTINE PROCESS
Patient is alert and oriented times three with no signs or symptoms of distress.  Dressing is clean dry and intact and pulses palpable

## 2018-06-07 NOTE — ROUTINE PROCESS
Patient is alert and oriented times three with no signs or symptoms of distress.  Dressing is clean dry and intact and pulses [id number]

## 2018-06-07 NOTE — PROGRESS NOTES
S/P right knee replacement under GA and nerve blok yesterday. Doing well, pain under control. PE: alert LEs no more numbness.

## 2018-06-07 NOTE — ROUTINE PROCESS
Mobility Intervention:       [] Pt dangled at edge of bed    [] Pt assisted OOB to bedside commode    [] Pt assisted OOB to chair    [x] Pt ambulated to bathroom    [] Patient was ambulated in room/hallway    Assistive Device Utilized:       [x] Rolling walker   [] Crutches   [] Straight Cane   [] Knee immobilizer   [] IV pole    After Mobilization:     [] Patient left in no apparent distress sitting up in chair  [x] Patient left in no apparent distress in bed  [x] Call bell left within reach  [x] SCDs on & machine turned on  [x] Ice applied  [] RN notified  [] Caregiver present  [] Bed alarm activated    Reason patient not mobilized:      [] Patient refused   [] Nausea/vomiting   [] Low blood pressure   [] Drowsy/lethargic    Pain Rating:     [] 0  [] 1  Assistive Device:        [] 2  [x] 3  [] 4  [] 5  [] 6  Assistive Device:        [] 7  [] 8  [] 9  [] 10    Comments:

## 2018-06-07 NOTE — ROUTINE PROCESS
Bedside and Verbal shift change report given to Laura Gonzalez (oncoming nurse) by Yoly goddard. Report included the following information SBAR, Kardex, Intake/Output and MAR.

## 2018-06-07 NOTE — PROGRESS NOTES
Mobility Intervention:       [] Pt dangled at edge of bed    [] Pt assisted OOB to bedside commode    [] Pt assisted OOB to chair    [] Pt ambulated to bathroom    [] Patient was ambulated in room/hallway    Assistive Device Utilized:       [] Rolling walker   [] Crutches   [] Straight Cane   [] Knee immobilizer   [] IV pole    After Mobilization: Found up in chair. [x] Patient left in no apparent distress sitting up in chair  [] Patient left in no apparent distress in bed  [x] Call bell left within reach  [x] SCDs on & machine turned on  [x] Ice applied  [] RN notified  [] Caregiver present  [] Bed alarm activated    Reason patient not mobilized:      [] Patient refused   [] Nausea/vomiting   [] Low blood pressure   [] Drowsy/lethargic    Pain Rating:     Left patient with call light, cell phone and personal belongings in reach for safety.

## 2018-06-07 NOTE — PROGRESS NOTES
[facility]    Progress Note      Patient: Carl Patrick               Sex: female          DOA: 6/6/2018         YOB: 1960      Age:  62 y.o.        LOS:  LOS: 1 day         S/P  Procedure(s):  RIGHT TOTAL KNEE ARTHROPLASTY/C-ARM/DEPUY ATTUNE/2 SA'S/ADDUCTOR BLOCK               Subjective:     Ambulating Nausea Voiding q shift. She has had some nausea since last night. She is getting zofran to treat, most recent dose at 8:20 this morning. She has participated in PT and her pain is managed on current medications. Denies cp, sob, abd pain   Objective:      Blood pressure 102/67, pulse 71, temperature 98.4 °F (36.9 °C), resp. rate 18, height 5' 6\" (1.676 m), weight 184 lb (83.5 kg), SpO2 96 %. Well developed, Well Nourished alert, cooperative, no distress  Incision clean, dry, no drainage, dressing in place  swelling and tenderness noted in right lower extremity  Sensory is intact   Motor is intact  nv intact  2+distal pulses  Neg calf tenderness  Neg for facial asymmetry     Labs:    Coagulation  Lab Results   Component Value Date    INR 1.00 05/30/2018    APTT 26 05/30/2018      Basic Metabolic Profile  Lab Results   Component Value Date     05/30/2018    CO2 29 05/30/2018    BUN 10 05/30/2018       Medications Reviewed      Assessment/Plan     Active Problems:    Arthritis of knee, right (6/6/2018)        Procedure(s):  RIGHT TOTAL KNEE ARTHROPLASTY/C-ARM/DEPUY ATTUNE/2 SA'S/ADDUCTOR BLOCK :     1. PT and/or OT Consults: YES continue PT/OT: oob to chair, gentle rom, WBAT total knee protocol                                                 2. Incision Care:  Routine Incision Care and Dressing Changes as necessary: dressing changes  as needed  3. Pain control - percocet in chart for discharge  4. DVT prophylaxis - SCD in place, xarelto in chart for discharge    4. DISCHARGE PLANNING     Intervention : Home with home health today.         Diandra GRIJALVA [facility]  [phone]

## 2018-06-07 NOTE — DISCHARGE INSTRUCTIONS
DISCHARGE SUMMARY from Nurse    The following personal items collected during your admission are returned to you:   Dental Appliance: Dental Appliances: None  Vision: Visual Aid: None  Hearing Aid:    Jewelry: Jewelry: None  Clothing: Clothing: Other (comment), Shirt, Undergarments, Footwear (skirt)  Other Valuables: Other Valuables: None  Valuables sent to safe:      PATIENT INSTRUCTIONS:    After general anesthesia or intravenous sedation, for 24 hours or while taking prescription Narcotics:  · Limit your activities  · Do not drive and operate hazardous machinery  · Do not make important personal or business decisions  · Do  not drink alcoholic beverages  · If you have not urinated within 8 hours after discharge, please contact your surgeon on call. Report the following to your surgeon:  · Excessive pain, swelling, redness or odor of or around the surgical area  · Temperature over 100.5  · Nausea and vomiting lasting longer than 4 hours or if unable to take medications  · Any signs of decreased circulation or nerve impairment to extremity: change in color, persistent  numbness, tingling, coldness or increase pain  · Any questions      Follow-up Appointments   Procedures    FOLLOW UP VISIT Appointment in: Two Weeks     Standing Status:   Standing     Number of Occurrences:   1     Order Specific Question:   Appointment in     Answer: Two Weeks       What to do at Home:    *  Please give a list of your current medications to your Primary Care Provider. *  Please update this list whenever your medications are discontinued, doses are      changed, or new medications (including over-the-counter products) are added. *  Please carry medication information at all times in case of emergency situations.     These are general instructions for a healthy lifestyle:    No smoking/ No tobacco products/ Avoid exposure to second hand smoke    Surgeon General's Warning:  Quitting smoking now greatly reduces serious risk to your health. Obesity, smoking, and sedentary lifestyle greatly increases your risk for illness    A healthy diet, regular physical exercise & weight monitoring are important for maintaining a healthy lifestyle    You may be retaining fluid if you have a history of heart failure or if you experience any of the following symptoms:  Weight gain of 3 pounds or more overnight or 5 pounds in a week, increased swelling in our hands or feet or shortness of breath while lying flat in bed. Please call your doctor as soon as you notice any of these symptoms; do not wait until your next office visit. Recognize signs and symptoms of STROKE:    F-face looks uneven    A-arms unable to move or move unevenly    S-speech slurred or non-existent    T-time-call 911 as soon as signs and symptoms begin-DO NOT go       Back to bed or wait to see if you get better-TIME IS BRAIN. The discharge information has been reviewed with the patient. The patient verbalized understanding. [employer] Activation    Thank you for requesting access to [employer]. Please follow the instructions below to securely access and download your online medical record. [employer] allows you to send messages to your doctor, view your test results, renew your prescriptions, schedule appointments, and more. How Do I Sign Up? 1. In your internet browser, go to [email or web address]. 2. Click on the First Time User? Click Here link in the Sign In box. You will see the New Member Sign Up page. 3. Enter your [employer] Access Code exactly as it appears below. You will not need to use this code after youve completed the sign-up process. If you do not sign up before the expiration date, you must request a new code. [employer] Access Code: [email or web address]  Expires: 2012  9:42 AM (This is the date your [employer] access code will )    4.  Enter the last four digits of your Social Security Number (xxxx) and Date of Birth (mm/dd/yyyy) as indicated and click Submit. You will be taken to the next sign-up page. 5. Create a [employer] ID. This will be your [employer] login ID and cannot be changed, so think of one that is secure and easy to remember. 6. Create a [employer] password. You can change your password at any time. 7. Enter your Password Reset Question and Answer. This can be used at a later time if you forget your password. 8. Enter your e-mail address. You will receive e-mail notification when new information is available in [address]. 9. Click Sign Up. You can now view and download portions of your medical record. 10. Click the Download Summary menu link to download a portable copy of your medical information. Additional Information    If you have questions, please visit the Frequently Asked Questions section of the [employer] website at [email or web address] Remember, [employer] is NOT to be used for urgent needs. For medical emergencies, dial 911. Armband removed and shredded    Discharge Instructions for Total Knee Replacement Patients    · The dressing on your knee will be changed by the Home Health professional at the appropriate time. Keep your incision clean and dry. Do not apply any ointments to the incision. · You may shower as long as you keep you incision dry. When showering, leave your dressing on. The dressing is waterproof as long as the edges are sealed. · Notify your surgeon if:  · Your temperature is greater than 100.5  · You have pain not controlled by your pain medication  · You have increased drainage from your incision  · You have increased redness or swelling in your leg  · You have chest pain, shortness of breath, or any other problems    · Do your exercises as instructed by the home physical therapist.    · Bend and straighten your operative leg every hour. Walk once an hour during normal walking hours.     · During periods of inactivity or rest, your leg should be elevated with a rolled towel or folded pillow under the heel to keep the knee straight. · Do not place anything under the knee. · Do not sit in a recliner chair with the footrest elevated. · You may use ice to your knee for 20-30 minutes after exercise and as needed. Do not apply the ice pack directly to your skin. Use a barrier such as your pant leg or a thin towel. · If you have LANDRY hose (the white support stockings), remove them at bedtime and re-apply the hose in the morning for the next 2 weeks. Best of luck with your new knee and [address] YOU for choosing the [address]!

## 2018-06-07 NOTE — PROGRESS NOTES
conducted an Initial consultation and Spiritual Assessment for Rea Felder, who is a 62 y.o.,female. Patients Primary Language is: [place]. According to the patients EMR Catholic Affiliation is: Sikh. The reason the Patient came to the hospital is:   Patient Active Problem List    Diagnosis Date Noted    Arthritis of knee, right 06/06/2018        The  provided the following Interventions:  Initiated a relationship of care and support. Patient was experiencing some pain, not severe, from knee replacement surgery. She said she will take pain medications when she gets home within the hour. Her daughter will take her home. Patient confirmed her Episcopalian affiliation as Liudmila 5. Listened empathically to patient. She did not have any spiritual concerns at this time. Offered assurance of prayer on patient's behalf. Chart reviewed. The following outcomes were achieved:  Patient shared limited information about her medical narrative. Patient expressed gratitude for the 's visit. Assessment:  Patient does not have any Episcopalian or cultural needs that will affect patients preferences in health care. Patient did not indicate any other spiritual or Episcopalian issues which require Spiritual Care Services interventions at this time. The Rev.  [address] [facility], [address] [facility] [address] [facility] 159  [facility] [phone] / [facility] [phone]

## 2018-06-07 NOTE — PROGRESS NOTES
Reason for Admission:   Osteo arthritis of knee                   RRAT Score:    7                Plan for utilizing home health: With Eladio                     Likelihood of Readmission:  Low/greeen                         Transition of Care Plan:      Patient will be staying with sister Tiffany Gautam [phone] who lives at [facility]. Adam Kay from [employer] is bringing the walker and bedside commode will be delivered to the home. Patient's sister will provided transportation while the patient is healing. Patient's PCP is Dr. Sherwood Closs Management Interventions  PCP Verified by CM:  Yes  Palliative Care Criteria Met (RRAT>21 & CHF Dx)?: No  Mode of Transport at Discharge:  (sister)  Transition of Care Consult (CM Consult): [address]: Yes  Current Support Network: Relative's Home  Confirm Follow Up Transport: Family  Plan discussed with Pt/Family/Caregiver: Yes   Resource Information Provided?: No  Discharge Location  Discharge Placement: Home with home health

## 2018-06-07 NOTE — ROUTINE PROCESS
Patient is alert and oriented times three with no signs or symptoms of distress. Dressing is clean dry and intact and pulses palpable.

## 2018-06-07 NOTE — PROGRESS NOTES
Problem: Mobility Impaired (Adult and Pediatric)  Goal: *Acute Goals and Plan of Care (Insert Text)  Physical Therapy Goals  Initiated 6/6/2018 and to be accomplished within 7 day(s)  1. Patient will move from supine to sit and sit to supine , scoot up and down and roll side to side in bed with supervision/set-up. 2.  Patient will transfer from bed to chair and chair to bed with supervision/set-up using the least restrictive device. 3.  Patient will perform sit to stand with supervision/set-up. 4.  Patient will ambulate with supervision/set-up for 150 feet with the least restrictive device. 5.  Patient will ascend/descend 4 stairs with 1 handrail(s) with supervision. Outcome: Resolved/Met Date Met: 06/07/18  physical Therapy TREATMENT/DISCHARGE    Patient: Carl Patrick (36 y.o. female)  Date: 6/7/2018  Diagnosis: Osteoarthritis of right knee, unspecified osteoarthritis type [M17.11] <principal problem not specified>  Procedure(s) (LRB):  RIGHT TOTAL KNEE ARTHROPLASTY/C-ARM/DEPUY ATTUNE/2 SA'S/ADDUCTOR BLOCK (Right) 1 Day Post-Op  Precautions: Fall, WBAT  Chart, physical therapy assessment, plan of care and goals were reviewed. ASSESSMENT:  Patient is 61 yo F admitted to hospital for TKA and presents today alert and agreeable to therapy. Patient was educated on weight bearing status, role of therapy, TE (see below), and equipment in room including role of SCDs, towel roll, and ice sleeve. Patient demonstrated intact SLR and transferred to sitting EOB for objective assessment. Patient was given demo with instruction on sit <> stand transfer and gait training. Patient transferred to standing with modified independence x3 trasnfers and ambulated total 180ft RW at supervision level. Patient negotiated 4 steps with Right HR with modified independence.   At conclusion of session patient transferred to sitting in recliner and was left resting with call bell by the side, SCDs donned, and towel roll under ankle. Patient instructed to call for assistance if they needed to get up for any reason and denied need for further assistance. Patient has met all goals, though required additional rest breaks between mobility due to nausea. RN notified that patient met all therapy goals. Progression toward goals:  [x]      Goals met  []      Improving appropriately and progressing toward goals  []      Improving slowly and progressing toward goals  []      Not making progress toward goals and plan of care will be adjusted     PLAN:  Patient will be discharged from physical therapy at this time. Rationale for discharge:  [x] Goals Achieved  [] [address]  [] Patient not participating in therapy  [] Other:  Discharge Recommendations:  Home Health  Further Equipment Recommendations for Discharge:  bedside commode, transfer bench, rolling walker and N/A     G-CODES:   Mobility  Current  CI= 1-19%   Goal  CI= 1-19%  D/C  CI= 1-19%. The severity rating is based on the Level of Assistance required for Functional Mobility and ADLs. SUBJECTIVE:   Patient stated I'm feeling okay.     OBJECTIVE DATA SUMMARY:   Critical Behavior:  Neurologic State: Alert  Orientation Level: Oriented X4  Cognition: Appropriate decision making  Safety/Judgement: Awareness of environment, Fall prevention  Functional Mobility Training:  Bed Mobility:   Scooting: Modified independent   Transfers:  Sit to Stand: Modified independent  Stand to Sit: Modified independent    Balance:  Sitting: Intact  Standing: Intact; With support  Ambulation/Gait Training:  Distance (ft): 180 Feet (ft)  Assistive Device: Walker, rolling  Ambulation - Level of Assistance: Supervision   Interventions: Verbal cues; Visual/Demos   Stairs:  Number of Stairs Trained: 4  Stairs - Level of Assistance: Modified independent   Rail Use: Right   Pain:  Pt reports 3/10 pain or discomfort prior to treatment.    Pt reports 3/10 pain or discomfort post treatment. Activity Tolerance:   Patient met all functional goals, however continues to experience constant nausea. Please refer to the flowsheet for vital signs taken during this treatment.   After treatment:   [x] Patient left in no apparent distress sitting up in chair  [] Patient left in no apparent distress in bed  [x] Call bell left within reach  [x] Nursing notified Tahira Bruno  [] Caregiver present  [] Bed alarm activated  [x] SCDs applied to FILI Gallegos, PT   Time Calculation: 33 mins

## 2018-06-07 NOTE — ROUTINE PROCESS
Mobility Intervention:       [] Pt dangled at edge of bed    [] Pt assisted OOB to bedside commode    [] Pt assisted OOB to chair    [x] Pt ambulated to bathroom    [] Patient was ambulated in room/hallway    Assistive Device Utilized:       [x] Rolling walker   [] Crutches   [] Straight Cane   [] Knee immobilizer   [] IV pole    After Mobilization:     [] Patient left in no apparent distress sitting up in chair  [x] Patient left in no apparent distress in bed  [x] Call bell left within reach  [x] SCDs on & machine turned on  [x] Ice applied  [] RN notified  [] Caregiver present  [] Bed alarm activated    Reason patient not mobilized:      [] Patient refused   [] Nausea/vomiting   [] Low blood pressure   [] Drowsy/lethargic    Pain Rating:     [] 0  [x] 1  Assistive Device:        [] 2  [] 3  [] 4  [] 5  [] 6  Assistive Device:        [] 7  [] 8  [] 9  [] 10    Comments:

## 2018-06-07 NOTE — ROUTINE PROCESS
Mobility Intervention:       [] Pt dangled at edge of bed    [] Pt assisted OOB to bedside commode    [] Pt assisted OOB to chair    [x] Pt ambulated to bathroom    [] Patient was ambulated in room/hallway    Assistive Device Utilized:       [x] Rolling walker   [] Crutches   [] Straight Cane   [] Knee immobilizer   [] IV pole    After Mobilization:     [] Patient left in no apparent distress sitting up in chair  [x] Patient left in no apparent distress in bed  [x] Call bell left within reach  [x] SCDs on & machine turned on  [x] Ice applied  [] RN notified  [] Caregiver present  [] Bed alarm activated    Reason patient not mobilized:      [] Patient refused   [] Nausea/vomiting   [] Low blood pressure   [] Drowsy/lethargic    Pain Rating:     [] 0  [] 1  Assistive Device:        [] 2  [] 3  [] 4  [] 5  [] 6  Assistive Device:        [x] 7  [] 8  [] 9  [] 10    Comments:

## 2018-06-08 ENCOUNTER — HOME CARE VISIT (OUTPATIENT)
Dept: SCHEDULING | Facility: HOME HEALTH | Age: 58
End: 2018-06-08
Payer: COMMERCIAL

## 2018-06-08 VITALS
OXYGEN SATURATION: 98 % | DIASTOLIC BLOOD PRESSURE: 76 MMHG | TEMPERATURE: 99.1 F | SYSTOLIC BLOOD PRESSURE: 132 MMHG | HEART RATE: 94 BPM | RESPIRATION RATE: 18 BRPM

## 2018-06-08 PROCEDURE — A6204 COMPOSITE DRSG >16<=48 SQ IN: HCPCS

## 2018-06-08 PROCEDURE — 400013 HH SOC

## 2018-06-08 PROCEDURE — G0299 HHS/HOSPICE OF RN EA 15 MIN: HCPCS

## 2018-06-08 NOTE — HOME CARE
(late entry) - Rec HC order 6/7 - d/c order noted 6/7 - pt is going home with sister [place] [phone].  Ports [phone] - sister Moses Cellar - [phone] cell - pt has RW and BSC - no other dme needed - D Donavan ROSSI

## 2018-06-09 ENCOUNTER — HOME CARE VISIT (OUTPATIENT)
Dept: SCHEDULING | Facility: HOME HEALTH | Age: 58
End: 2018-06-09
Payer: COMMERCIAL

## 2018-06-09 VITALS
SYSTOLIC BLOOD PRESSURE: 120 MMHG | TEMPERATURE: 98.1 F | OXYGEN SATURATION: 98 % | HEART RATE: 92 BPM | DIASTOLIC BLOOD PRESSURE: 82 MMHG

## 2018-06-09 PROCEDURE — G0151 HHCP-SERV OF PT,EA 15 MIN: HCPCS

## 2018-06-10 ENCOUNTER — HOME CARE VISIT (OUTPATIENT)
Dept: SCHEDULING | Facility: HOME HEALTH | Age: 58
End: 2018-06-10
Payer: COMMERCIAL

## 2018-06-10 PROCEDURE — G0157 HHC PT ASSISTANT EA 15: HCPCS

## 2018-06-11 ENCOUNTER — HOME CARE VISIT (OUTPATIENT)
Dept: HOME HEALTH SERVICES | Facility: HOME HEALTH | Age: 58
End: 2018-06-11
Payer: COMMERCIAL

## 2018-06-11 VITALS
TEMPERATURE: 97.4 F | OXYGEN SATURATION: 98 % | SYSTOLIC BLOOD PRESSURE: 110 MMHG | DIASTOLIC BLOOD PRESSURE: 64 MMHG | HEART RATE: 90 BPM

## 2018-06-11 NOTE — DISCHARGE SUMMARY
[address]    [facility]  MR#: [id number]  : 1960  ACCOUNT #: [de-identified]   ADMIT DATE: 2018  DISCHARGE DATE: 2018    The patient is a 59-year-old female with long history of progressive right knee pain who appears to have progressive pain despite conservative treatment. Her clinical examination was regular. She has severe arthritis of her right knee. The patient was admitted this time for elective right total knee arthroplasty. HOSPITAL COURSE:  The patient was admitted to the hospital on 2018. She was taken to the operating room and underwent an uncomplicated right total knee arthroplasty with a [employer] payworks system. There were no apparent complications. Perioperatively, she was maintained on weight adjusted Ancef IV piggyback q.8 hours until the first post day. DVT prophylaxis was obtained with bilateral thigh high LANDRY stockings as well as sequential compression stockings. In addition, she was started on early mobilization. In addition, she was started on Xarelto 10 mg at bedtime on the day of surgery. She had no evidence of deep vein thrombosis during her hospitalization. She was started with a course of physical therapy, ambulation, weightbearing as tolerated on the right leg. She did well with therapy, but on the day of surgery, she was nauseated. This limited her mobility the first day. By the following morning, she was independently ambulating with a walker prior to discharge home. She was discharged home in stable condition on the first postop day, on 2018. She will receive home physical therapy, home health care nursing. She is on analgesics plus Xarelto for DVT prophylaxis. She will follow up in my office in approximately 2 weeks postoperative and remove staples from the right knee. Any problems in the meantime, she is to notify me. DISPOSITION:  The patient was discharged home.       OLVIN VANCE MD YUSUF Brady/  D: 06/11/2018 13:47     T: 06/11/2018 15:12  JOB #: [id number]

## 2018-06-12 ENCOUNTER — HOME CARE VISIT (OUTPATIENT)
Dept: SCHEDULING | Facility: HOME HEALTH | Age: 58
End: 2018-06-12
Payer: COMMERCIAL

## 2018-06-12 PROCEDURE — G0151 HHCP-SERV OF PT,EA 15 MIN: HCPCS

## 2018-06-13 ENCOUNTER — HOME CARE VISIT (OUTPATIENT)
Dept: SCHEDULING | Facility: HOME HEALTH | Age: 58
End: 2018-06-13
Payer: COMMERCIAL

## 2018-06-13 VITALS
DIASTOLIC BLOOD PRESSURE: 90 MMHG | HEART RATE: 88 BPM | TEMPERATURE: 98.2 F | RESPIRATION RATE: 18 BRPM | OXYGEN SATURATION: 98 % | SYSTOLIC BLOOD PRESSURE: 130 MMHG

## 2018-06-13 PROCEDURE — G0151 HHCP-SERV OF PT,EA 15 MIN: HCPCS

## 2018-06-13 PROCEDURE — G0300 HHS/HOSPICE OF LPN EA 15 MIN: HCPCS

## 2018-06-14 ENCOUNTER — HOME CARE VISIT (OUTPATIENT)
Dept: HOME HEALTH SERVICES | Facility: HOME HEALTH | Age: 58
End: 2018-06-14
Payer: COMMERCIAL

## 2018-06-14 ENCOUNTER — HOME CARE VISIT (OUTPATIENT)
Dept: SCHEDULING | Facility: HOME HEALTH | Age: 58
End: 2018-06-14
Payer: COMMERCIAL

## 2018-06-14 VITALS
SYSTOLIC BLOOD PRESSURE: 120 MMHG | DIASTOLIC BLOOD PRESSURE: 80 MMHG | OXYGEN SATURATION: 97 % | HEART RATE: 79 BPM | RESPIRATION RATE: 18 BRPM | TEMPERATURE: 98.7 F

## 2018-06-14 VITALS
TEMPERATURE: 98.3 F | HEART RATE: 78 BPM | RESPIRATION RATE: 18 BRPM | DIASTOLIC BLOOD PRESSURE: 80 MMHG | OXYGEN SATURATION: 98 % | SYSTOLIC BLOOD PRESSURE: 110 MMHG

## 2018-06-14 PROCEDURE — G0151 HHCP-SERV OF PT,EA 15 MIN: HCPCS

## 2018-06-16 ENCOUNTER — HOME CARE VISIT (OUTPATIENT)
Dept: SCHEDULING | Facility: HOME HEALTH | Age: 58
End: 2018-06-16
Payer: COMMERCIAL

## 2018-06-16 VITALS
TEMPERATURE: 98.4 F | DIASTOLIC BLOOD PRESSURE: 78 MMHG | HEART RATE: 80 BPM | OXYGEN SATURATION: 98 % | SYSTOLIC BLOOD PRESSURE: 122 MMHG

## 2018-06-16 VITALS
TEMPERATURE: 97 F | SYSTOLIC BLOOD PRESSURE: 120 MMHG | OXYGEN SATURATION: 98 % | RESPIRATION RATE: 16 BRPM | DIASTOLIC BLOOD PRESSURE: 70 MMHG | HEART RATE: 76 BPM

## 2018-06-16 PROCEDURE — G0157 HHC PT ASSISTANT EA 15: HCPCS

## 2018-06-16 PROCEDURE — G0299 HHS/HOSPICE OF RN EA 15 MIN: HCPCS

## 2018-06-18 ENCOUNTER — HOME CARE VISIT (OUTPATIENT)
Dept: SCHEDULING | Facility: HOME HEALTH | Age: 58
End: 2018-06-18
Payer: COMMERCIAL

## 2018-06-18 VITALS
SYSTOLIC BLOOD PRESSURE: 120 MMHG | TEMPERATURE: 98.8 F | HEART RATE: 104 BPM | RESPIRATION RATE: 18 BRPM | DIASTOLIC BLOOD PRESSURE: 80 MMHG | OXYGEN SATURATION: 96 %

## 2018-06-18 DIAGNOSIS — M17.11 PRIMARY OSTEOARTHRITIS OF RIGHT KNEE: Primary | ICD-10-CM

## 2018-06-18 PROCEDURE — G0151 HHCP-SERV OF PT,EA 15 MIN: HCPCS

## 2018-06-20 ENCOUNTER — HOME CARE VISIT (OUTPATIENT)
Dept: SCHEDULING | Facility: HOME HEALTH | Age: 58
End: 2018-06-20
Payer: COMMERCIAL

## 2018-06-20 PROCEDURE — G0151 HHCP-SERV OF PT,EA 15 MIN: HCPCS

## 2018-06-21 ENCOUNTER — OFFICE VISIT (OUTPATIENT)
Dept: ORTHOPEDIC SURGERY | Age: 58
End: 2018-06-21

## 2018-06-21 VITALS
HEART RATE: 103 BPM | OXYGEN SATURATION: 92 % | WEIGHT: 181 LBS | RESPIRATION RATE: 16 BRPM | BODY MASS INDEX: 29.09 KG/M2 | DIASTOLIC BLOOD PRESSURE: 74 MMHG | SYSTOLIC BLOOD PRESSURE: 136 MMHG | HEIGHT: 66 IN | TEMPERATURE: 97.5 F

## 2018-06-21 DIAGNOSIS — Z96.651 STATUS POST TOTAL RIGHT KNEE REPLACEMENT: Primary | ICD-10-CM

## 2018-06-21 NOTE — PROGRESS NOTES
Patient: Erlin Baig  YOB: 1960       HISTORY:  The patient presents for reevaluation of her s/p right total knee arthroplasty on 6/6/18. Patient is improved, states pain is a 0 out of 10.  she has participated in H/H physical therapy. has been doing knee exercises at home. She is doing very well today. Patient denies any fever, chills, chest pain, shortness of breath or calf pain. There are no new illness or injuries to report since last seen in the office. PHYSICAL EXAMINATION:    Visit Vitals    /74 (BP 1 Location: Left arm, BP Patient Position: Sitting)    Pulse (!) 103    Temp 97.5 °F (36.4 °C) (Oral)    Resp 16    Ht 5' 6\" (1.676 m)    Wt 181 lb (82.1 kg)    SpO2 92%    BMI 29.21 kg/m2     The patient is a well-developed, well-nourished female in no acute distress. The patient is alert and oriented times three. The patient appears to be well groomed. Mood and affect are normal.   ORTHOPEDIC EXAM of Right knee: Inspection: Effusion present,  incisions clean, dry intact, staples in place  Walk: with a walker today  TTP: none  Range of motion: -2-95 flexion  Stability: Stable   Strength: 5/5  2+ distal pulses      IMPRESSION:  Status post Right total knee arthroplasty. PLAN:  Pt doing well post operatively  Incisions cleaned. Staples removed and steri strips applied  Stressed to patient that nothing causes an increase in pain or swelling. Patient is weight bearing as tolerated. OK to transition from walker to cane. Will set up outpt physical therapy. Given an order today  Patient not given a refill of pain medication. Patient will follow up in 2 weeks.     Nirmal Saenz [facility]

## 2018-06-22 ENCOUNTER — HOME CARE VISIT (OUTPATIENT)
Dept: SCHEDULING | Facility: HOME HEALTH | Age: 58
End: 2018-06-22
Payer: COMMERCIAL

## 2018-06-22 ENCOUNTER — HOME CARE VISIT (OUTPATIENT)
Dept: HOME HEALTH SERVICES | Facility: HOME HEALTH | Age: 58
End: 2018-06-22
Payer: COMMERCIAL

## 2018-06-22 VITALS
OXYGEN SATURATION: 98 % | RESPIRATION RATE: 18 BRPM | HEART RATE: 63 BPM | DIASTOLIC BLOOD PRESSURE: 65 MMHG | TEMPERATURE: 97.9 F | SYSTOLIC BLOOD PRESSURE: 127 MMHG

## 2018-06-22 PROCEDURE — G0151 HHCP-SERV OF PT,EA 15 MIN: HCPCS

## 2018-06-23 VITALS
SYSTOLIC BLOOD PRESSURE: 120 MMHG | HEART RATE: 78 BPM | TEMPERATURE: 98.9 F | SYSTOLIC BLOOD PRESSURE: 130 MMHG | OXYGEN SATURATION: 99 % | TEMPERATURE: 99.7 F | RESPIRATION RATE: 18 BRPM | RESPIRATION RATE: 18 BRPM | DIASTOLIC BLOOD PRESSURE: 70 MMHG | OXYGEN SATURATION: 99 % | DIASTOLIC BLOOD PRESSURE: 80 MMHG | HEART RATE: 71 BPM

## 2018-06-25 ENCOUNTER — HOSPITAL ENCOUNTER (OUTPATIENT)
Dept: PHYSICAL THERAPY | Age: 58
Discharge: HOME | End: 2018-06-25
Payer: COMMERCIAL

## 2018-06-25 PROCEDURE — 97161 PT EVAL LOW COMPLEX 20 MIN: CPT

## 2018-06-25 PROCEDURE — 97110 THERAPEUTIC EXERCISES: CPT

## 2018-06-25 NOTE — PROGRESS NOTES
PT DAILY TREATMENT NOTE     Patient Name: Billy Dowd  Date:2018  : 1960  [x]  Patient  Verified  Payor: Cassius Jay / Plan: [place] [employer] P[place] / Product Type: PPO /    In time:9:01  Out time:9:40  Total Treatment Time (min): 39  Visit #: 1 of 12    Treatment Area: Presence of right artificial knee joint [Z96.651]    SUBJECTIVE  Pain Level (0-10 scale): 3/10  Any medication changes, allergies to medications, adverse drug reactions, diagnosis change, or new procedure performed?: [x] No    [] Yes (see summary sheet for update)  Subjective functional status/changes:   [] No changes reported  The patient states that she has a chief complaint of stiffness. OBJECTIVE  21 min [x]Eval                  []Re-Eval        10 min Therapeutic Exercise:  [] See flow sheet :   Rationale: increase ROM and increase strength to improve the patients ability to improve ADL ease. 8 min Gait Training:  __200_ feet with _SPC__ device on level surfaces with __supervision_ level of assist   Rationale: To maximize ambulation efficiency. With   [] TE   [] TA   [] neuro   [] other: Patient Education: [x] Review HEP    [] Progressed/Changed HEP based on:   [] positioning   [] body mechanics   [] transfers   [] heat/ice application    [] other:      Other Objective/Functional Measures: See IE     Pain Level (0-10 scale) post treatment: 0/10    ASSESSMENT/Changes in Function: See POC. Patient will continue to benefit from skilled PT services to modify and progress therapeutic interventions, address functional mobility deficits, address ROM deficits, address strength deficits, analyze and address soft tissue restrictions, analyze and cue movement patterns, analyze and modify body mechanics/ergonomics, assess and modify postural abnormalities and instruct in home and community integration to attain remaining goals.      []  See Plan of Care  []  See progress note/recertification  []  See Discharge Summary Progress towards goals / Updated goals:  Short Term Goals: To be accomplished in 2 weeks:                         1. The patient will be independent with HEP to maximize therapeutic benefit. 2. The patient will improve right knee extension to 0 degrees to maximize ambulation efficiency. Long Term Goals: To be accomplished in 4 weeks:                         1. The patient will improve FOTO score to 60 to maximize ADL ease. 2. The patient will improve right knee flexion to 120 degrees to maximize ease of ambulation efficiency. 3. The patient will maximize strength of right quad to 5/5 MMT to maximize stability during ambulation.                          4. The patient will ambulate void of AD without compensations to maximize ambulation efficiency.      PLAN  []  Upgrade activities as tolerated     [x]  Continue plan of care  []  Update interventions per flow sheet       []  Discharge due to:_  []  Other:_      Eric Fuentes, PT 6/25/2018  9:50 AM    Future Appointments  Date Time Provider Jean Paul Louis   6/28/2018 10:30 AM Lion Coulter [facility]   7/2/2018 8:00 AM Lion Dove [facility]   7/5/2018 8:45 AM MARY Romero [facility]   7/5/2018 10:00 AM Bob Lou, PTA [facility]   7/6/2018 9:00 AM Bob Lou, PTA [facility]   7/9/2018 8:30 AM Leonila Thomas, PTA [facility]   7/11/2018 9:00 AM Bob Lou, PTA [facility]   7/13/2018 8:00 AM Leonila Thomas, PTA [facility]   7/16/2018 9:00 AM Amara Pike [facility]   7/18/2018 9:00 AM Eric Fuentes, PT [facility]   7/20/2018 8:30 AM Leonila Thomas, PTA [facility]

## 2018-06-25 NOTE — PROGRESS NOTES
[facility]  [address]  [place], [address]  [phone] fax    Plan of Care/ Statement of Necessity for Physical Therapy Services    Patient name: Dennis Burnham Start of Care: 2018   Referral source: Lisa Herrera,* : 1960    Medical Diagnosis: Presence of right artificial knee joint [Z96.651]   Onset Date:2018    Treatment Diagnosis: Right TKR   Prior Hospitalization: see medical history Provider#: [id number]   Medications: Verified on Patient summary List    Comorbidities: Arthritis, Right TKR   Prior Level of Function: The patient used a SPC at times and had difficulty sleeping, pain with ambulation and limited stair negotiation prior to onset. The Plan of Care and following information is based on the information from the initial evaluation. Assessment/ key information: The patient is a 62year old female s/p right TKR that was performed on 2018. The patient indicates that she has completed the surgery void of complications up to this point and has engaged in home health. She is using a RW for ambulation currently and is staying with her sister for care until she feels safe to transition back home. The patient has impairments related to the procedure consisting of pain, decreased ROM, decreased flexibility, decreased strength, limited ADL ease. The patient will benefit from skilled PT in order to address the aforementioned impairments.      Evaluation Complexity History MEDIUM  Complexity : 1-2 comorbidities / personal factors will impact the outcome/ POC ; Examination LOW Complexity : 1-2 Standardized tests and measures addressing body structure, function, activity limitation and / or participation in recreation  ;Presentation LOW Complexity : Stable, uncomplicated  ;Clinical Decision Making MEDIUM Complexity : FOTO score of 26-74   Overall Complexity Rating: LOW   Problem List: pain affecting function, decrease ROM, decrease strength, edema affecting function, impaired gait/ balance, decrease ADL/ functional abilitiies, decrease activity tolerance, decrease flexibility/ joint mobility and decrease transfer abilities   Treatment Plan may include any combination of the following: Therapeutic exercise, Therapeutic activities, Neuromuscular re-education, Physical agent/modality, Gait/balance training, Manual therapy, Patient education, Self Care training, Functional mobility training, Home safety training and Stair training  Patient / Family readiness to learn indicated by: asking questions, trying to perform skills and interest  Persons(s) to be included in education: patient (P)  Barriers to Learning/Limitations: None  Patient Goal (s): pain free  Patient Self Reported Health Status: good  Rehabilitation Potential: good    Short Term Goals: To be accomplished in 2 weeks:   1. The patient will be independent with HEP to maximize therapeutic benefit. 2. The patient will improve right knee extension to 0 degrees to maximize ambulation efficiency. Long Term Goals: To be accomplished in 4 weeks:   1. The patient will improve FOTO score to 60 to maximize ADL ease. 2. The patient will improve right knee flexion to 120 degrees to maximize ease of ambulation efficiency. 3. The patient will maximize strength of right quad to 5/5 MMT to maximize stability during ambulation. 4. The patient will ambulate void of AD without compensations to maximize ambulation efficiency. Frequency / Duration: Patient to be seen 3 times per week for 4 weeks.     Patient/ Caregiver education and instruction: Diagnosis, prognosis, self care, activity modification and exercises   [x]  Plan of care has been reviewed with NEHA Barr, PT 6/25/2018 9:44 AM    ________________________________________________________________________    I certify that the above Therapy Services are being furnished while the patient is under my care. I agree with the treatment plan and certify that this therapy is necessary.     [de-identified] Signature:____________________  Date:____________Time: _________    Please sign and return to [facility]  [address] [facility] [address]  [place], [address]  [phone] fax

## 2018-06-28 ENCOUNTER — HOSPITAL ENCOUNTER (OUTPATIENT)
Dept: PHYSICAL THERAPY | Age: 58
Discharge: HOME | End: 2018-06-28
Payer: COMMERCIAL

## 2018-06-28 PROCEDURE — 97110 THERAPEUTIC EXERCISES: CPT

## 2018-06-28 PROCEDURE — 97140 MANUAL THERAPY 1/> REGIONS: CPT

## 2018-06-28 PROCEDURE — 97116 GAIT TRAINING THERAPY: CPT

## 2018-06-28 PROCEDURE — 97016 VASOPNEUMATIC DEVICE THERAPY: CPT

## 2018-06-28 NOTE — PROGRESS NOTES
PT DAILY TREATMENT NOTE 3-16    Patient Name: Benita Paul  Date:2018  : 1960  [x]  Patient  Verified  Payor: Mango Montague / Plan: [place] [employer] [place] / Product Type: PPO /    In time:10:30  Out time:11:20  Total Treatment Time (min): 50  Visit #: 2 of 12    Treatment Area: Right knee pain [M25.561]    SUBJECTIVE  Pain Level (0-10 scale): 3 \"headache\"  Any medication changes, allergies to medications, adverse drug reactions, diagnosis change, or new procedure performed?: [x] No    [] Yes (see summary sheet for update)  Subjective functional status/changes:   [] No changes reported  \"It's feeling better. \"    OBJECTIVE  Modality rationale: decrease edema, decrease inflammation and decrease pain to improve the patients ability to ease soreness after therapy   Min Type Additional Details    [] Estim:  []Unatt       []IFC  []Premod                        []Other:  []w/ice   []w/heat  Position:  Location:    [] Estim: []Att    []TENS instruct  []NMES                    []Other:  []w/US   []w/ice   []w/heat  Position:  Location:    []  Traction: [] Cervical       []Lumbar                       [] Prone          []Supine                       []Intermittent   []Continuous Lbs:  [] before manual  [] after manual    []  Ultrasound: []Continuous   [] Pulsed                           []1MHz   []3MHz Location:  W/cm2:    []  Iontophoresis with dexamethasone         Location: [] Take home patch   [] In clinic    []  Ice     []  heat  []  Ice massage  []  Laser   []  Anodyne Position:  Location:    []  Laser with stim  []  Other: Position:  Location:   10 [x]  Vasopneumatic Device Pressure:       [x] lo [] med [] hi   Temperature: [x] lo [] med [] hi   [] Skin assessment post-treatment:  []intact []redness- no adverse reaction    []redness - adverse reaction:     22 min Therapeutic Exercise:  [x] See flow sheet :   Rationale: increase ROM, increase strength and improve coordination to improve the patients ability to increase ease with ADLs    10 min Gait Trainin feet with SPC device on level surfaces with supervision level of assist   Rationale: to progress to LRAD      8 min Manual Therapy:  PROM right knee, right patellar mobs   Rationale: decrease pain, increase ROM and increase tissue extensibility to ease ADL tolerance           With   [] TE   [] TA   [] neuro   [] other: Patient Education: [x] Review HEP    [] Progressed/Changed HEP based on:   [] positioning   [] body mechanics   [] transfers   [] heat/ice application    [] other:      Other Objective/Functional Measures:   First follow up session---cues sequencing and correct form throughout     Pain Level (0-10 scale) post treatment: 3    ASSESSMENT/Changes in Function:   Initiated POC per flowsheet. Patient puts forth good effort with exercises and reports HEP compliance. Very poor mini squat form--improves slightly with table as tactile target. Good gait at [facility] though presents with Trendelenburg as she fatigues. Patient will continue to benefit from skilled PT services to modify and progress therapeutic interventions, address functional mobility deficits, address ROM deficits, address strength deficits, analyze and address soft tissue restrictions, analyze and cue movement patterns, analyze and modify body mechanics/ergonomics and assess and modify postural abnormalities to attain remaining goals. []  See Plan of Care  []  See progress note/recertification  []  See Discharge Summary         Short Term Goals: To be accomplished in 2 weeks:                         1. The patient will be independent with HEP to maximize therapeutic benefit.met per patient report (2018)                         2. The patient will improve right knee extension to 0 degrees to maximize ambulation efficiency. Long Term Goals: To be accomplished in 4 weeks:                         1. The patient will improve FOTO score to 60 to maximize ADL ease. 2. The patient will improve right knee flexion to 120 degrees to maximize ease of ambulation efficiency. 3. The patient will maximize strength of right quad to 5/5 MMT to maximize stability during ambulation.                          4. The patient will ambulate void of AD without compensations to maximize ambulation efficiency.        PLAN  []  Upgrade activities as tolerated     [x]  Continue plan of care  []  Update interventions per flow sheet       []  Discharge due to:_  []  Other:_      Pepper Ovens 6/28/2018  10:33 AM    Future Appointments  Date Time Provider Jean Paul Louis   7/2/2018 8:00 AM Denisse Yeboah [address] [facility]   7/5/2018 8:45 AM MARY [address]   7/5/2018 10:00 AM Rey Mosley, PTA [facility]   7/6/2018 9:00 AM Rey Mosley, PTA [facility]   7/9/2018 8:30 AM Renan Fenton, PTA [facility]   7/11/2018 9:00 AM Rey Mosley, PTA [facility]   7/13/2018 8:00 AM Renan Fenton, PTA [facility]   7/16/2018 9:00 AM Samuel Pike [facility]   7/18/2018 9:00 AM Manda Arreola, PT [facility]   7/20/2018 8:30 AM Renan Fenton, PTA [facility]

## 2018-07-02 ENCOUNTER — HOSPITAL ENCOUNTER (OUTPATIENT)
Dept: PHYSICAL THERAPY | Age: 58
Discharge: HOME | End: 2018-07-02
Payer: COMMERCIAL

## 2018-07-02 PROCEDURE — 97140 MANUAL THERAPY 1/> REGIONS: CPT

## 2018-07-02 PROCEDURE — 97112 NEUROMUSCULAR REEDUCATION: CPT

## 2018-07-02 PROCEDURE — 97110 THERAPEUTIC EXERCISES: CPT

## 2018-07-02 PROCEDURE — 97016 VASOPNEUMATIC DEVICE THERAPY: CPT

## 2018-07-02 NOTE — PROGRESS NOTES
PT DAILY TREATMENT NOTE 3-16    Patient Name: Juan Jose Shayna  Date:2018  : 1960  [x]  Patient  Verified  Payor: Art Sparks / Plan: [place] [employer] [place] / Product Type: PPO /    In time:8:00  Out time:8:49  Total Treatment Time (min): 52  Visit #: 3 of 12    Treatment Area: Right knee pain [M25.561]    SUBJECTIVE  Pain Level (0-10 scale): 4  Any medication changes, allergies to medications, adverse drug reactions, diagnosis change, or new procedure performed?: [x] No    [] Yes (see summary sheet for update)  Subjective functional status/changes:   [] No changes reported  \"I can deal with the knee pain, but just around my knee, that hurts so bad. \"    OBJECTIVE  Modality rationale: decrease edema, decrease inflammation and decrease pain to improve the patients ability to ease ADL tolerance   Min Type Additional Details    [] Estim:  []Unatt       []IFC  []Premod                        []Other:  []w/ice   []w/heat  Position:  Location:    [] Estim: []Att    []TENS instruct  []NMES                    []Other:  []w/US   []w/ice   []w/heat  Position:  Location:    []  Traction: [] Cervical       []Lumbar                       [] Prone          []Supine                       []Intermittent   []Continuous Lbs:  [] before manual  [] after manual    []  Ultrasound: []Continuous   [] Pulsed                           []1MHz   []3MHz Location:  W/cm2:    []  Iontophoresis with dexamethasone         Location: [] Take home patch   [] In clinic    []  Ice     []  heat  []  Ice massage  []  Laser   []  Anodyne Position:  Location:    []  Laser with stim  []  Other: Position:  Location:   10 [x]  Vasopneumatic Device Pressure:       [x] lo [] med [] hi   Temperature: [x] lo [] med [] hi   [] Skin assessment post-treatment:  []intact []redness- no adverse reaction    []redness - adverse reaction:     21 min Therapeutic Exercise:  [x] See flow sheet :   Rationale: increase ROM, increase strength and improve coordination to improve the patients ability to increase ease with ADLs    10 min Neuromuscular Re-education:  [x]  See flow sheet :  Dynamic gait with SPC: lateral side stepping, retro ambulation, high marches   Rationale: increase strength, improve coordination, improve balance and increase proprioception   to improve gait quality       8 min Manual Therapy:  PROM right knee, right patellar mobs, grade II tibiofemoral ext mobs   Rationale: decrease pain, increase ROM and increase tissue extensibility to ease ADL tolerance           With   [] TE   [] TA   [] neuro   [] other: Patient Education: [x] Review HEP    [] Progressed/Changed HEP based on:   [] positioning   [] body mechanics   [] transfers   [] heat/ice application    [] other:      Other Objective/Functional Measures:   Patient arrives to session today void of any AD and presents with antalgic gait. Pain Level (0-10 scale) post treatment: 3    ASSESSMENT/Changes in Function:   Educated patient to utilize [facility] when she is ambulating community distances. She presents with good stability with dynamic gait and ROM is improving nicely. Patient will continue to benefit from skilled PT services to modify and progress therapeutic interventions, address functional mobility deficits, address ROM deficits, address strength deficits, analyze and address soft tissue restrictions, analyze and cue movement patterns, analyze and modify body mechanics/ergonomics and assess and modify postural abnormalities to attain remaining goals. []  See Plan of Care  []  See progress note/recertification  []  See Discharge Summary         Short Term Goals: To be accomplished in 2 weeks:                         1. The patient will be independent with HEP to maximize therapeutic benefit.met per patient report (6/28/2018)                         2. The patient will improve right knee extension to 0 degrees to maximize ambulation efficiency. met (7/2/2018)  Long Term Goals: To be accomplished in 4 weeks:                         4. The patient will improve FOTO score to 60 to maximize ADL ease.                         2. The patient will improve right knee flexion to 120 degrees to maximize ease of ambulation efficiency.                        8. The patient will maximize strength of right quad to 5/5 MMT to maximize stability during ambulation.                         4. The patient will ambulate void of AD without compensations to maximize ambulation efficiency.      PLAN  []  Upgrade activities as tolerated     [x]  Continue plan of care  []  Update interventions per flow sheet       []  Discharge due to:_  []  Other:_      Pamila Fleischer 7/2/2018  8:01 AM    Future Appointments  Date Time Provider Jean Paul Louis   7/5/2018 8:45 AM MARY [address]   7/5/2018 10:00 AM Farnaz Darshan, PTA [facility]   7/6/2018 9:00 AM Farnaz Darshan, PTA [facility]   7/9/2018 8:30 AM Glenda Stairs, PTA [facility]   7/11/2018 9:00 AM Farnaz Darshan, PTA [facility]   7/13/2018 8:00 AM Glenda Stairs, PTA [facility]   7/16/2018 9:00 AM Pamila Fleischer [facility]   7/18/2018 9:00 AM Alexey Bernard, PT [facility]   7/20/2018 8:30 AM Glenda Stairs, PTA [facility]

## 2018-07-05 ENCOUNTER — OFFICE VISIT (OUTPATIENT)
Dept: ORTHOPEDIC SURGERY | Age: 58
End: 2018-07-05

## 2018-07-05 ENCOUNTER — HOSPITAL ENCOUNTER (OUTPATIENT)
Dept: PHYSICAL THERAPY | Age: 58
Discharge: HOME | End: 2018-07-05
Payer: COMMERCIAL

## 2018-07-05 DIAGNOSIS — Z96.651 STATUS POST TOTAL RIGHT KNEE REPLACEMENT: Primary | ICD-10-CM

## 2018-07-05 DIAGNOSIS — G89.18 POST-OP PAIN: ICD-10-CM

## 2018-07-05 PROCEDURE — 97110 THERAPEUTIC EXERCISES: CPT

## 2018-07-05 PROCEDURE — 97116 GAIT TRAINING THERAPY: CPT

## 2018-07-05 PROCEDURE — 97140 MANUAL THERAPY 1/> REGIONS: CPT

## 2018-07-05 PROCEDURE — 97112 NEUROMUSCULAR REEDUCATION: CPT

## 2018-07-05 RX ORDER — OXYCODONE AND ACETAMINOPHEN 7.5; 325 MG/1; MG/1
1 TABLET ORAL
Qty: 28 TAB | Refills: 0 | Status: SHIPPED | OUTPATIENT
Start: 2018-07-05 | End: 2018-07-19

## 2018-07-05 RX ORDER — DICLOFENAC SODIUM 10 MG/G
4 GEL TOPICAL 4 TIMES DAILY
Qty: 100 G | Refills: 2 | Status: SHIPPED | OUTPATIENT
Start: 2018-07-05 | End: 2019-10-09

## 2018-07-05 NOTE — PROGRESS NOTES
PT DAILY TREATMENT NOTE     Patient Name: Marc Hutchinson  Date:2018  : 1960  [x]  Patient  Verified  Payor: Haroon Koroma / Plan: [employer] PPO / Product Type: PPO /    In time:10:00  Out time:10:38  Total Treatment Time (min): 38  Visit #: 4 of 12    Treatment Area: Right knee pain [M25.561]    SUBJECTIVE  Pain Level (0-10 scale): 5/10  Any medication changes, allergies to medications, adverse drug reactions, diagnosis change, or new procedure performed?: [x] No    [] Yes (see summary sheet for update)  Subjective functional status/changes:   [] No changes reported  Pt reports no new complaints of pain. Pt reports compliance with HEP. OBJECTIVE    Modality rationale: decrease inflammation and decrease pain to improve the patients ability to tolerate ADLs. Min Type Additional Details    [] Estim:  []Unatt       []IFC  []Premod                        []Other:  []w/ice   []w/heat  Position:  Location:    [] Estim: []Att    []TENS instruct  []NMES                    []Other:  []w/US   []w/ice   []w/heat  Position:  Location:    []  Traction: [] Cervical       []Lumbar                       [] Prone          []Supine                       []Intermittent   []Continuous Lbs:  [] before manual  [] after manual    []  Ultrasound: []Continuous   [] Pulsed                           []1MHz   []3MHz W/cm2:  Location:    []  Iontophoresis with dexamethasone         Location: [] Take home patch   [] In clinic    []  Ice     []  heat  []  Ice massage  []  Laser   []  Anodyne Position:  Location:    []  Laser with stim  []  Other:  Position:  Location:    []  Vasopneumatic Device Pressure:       [] lo [] med [] hi   Temperature: [] lo [] med [] hi   [] Skin assessment post-treatment:  []intact []redness- no adverse reaction    []redness - adverse reaction:     12 min Therapeutic Exercise:  [x] See flow sheet :   Rationale: increase ROM and increase strength to improve the patients ability to tolerate ADLs. 8 min Manual Therapy:  PROM to right knee, patellar mobs, scar massage   Rationale: decrease pain, increase ROM and increase tissue extensibility to improve tolerance to ADLs. 8 min Gait Training:  Marching, side stepping, backward walking 30 feet each with no device on level surfaces with SBA level of assist   Rationale: With   [] TE   [] TA   [] neuro   [] other: Patient Education: [x] Review HEP    [] Progressed/Changed HEP based on:   [] positioning   [] body mechanics   [] transfers   [] heat/ice application    [] other:      Other Objective/Functional Measures: PROM 0-120 degrees right knee. Pain Level (0-10 scale) post treatment: 3/10    ASSESSMENT/Changes in Function: Pt demonstrates good progress with available right knee PROM. Patient will continue to benefit from skilled PT services to modify and progress therapeutic interventions, address functional mobility deficits, address ROM deficits, address strength deficits, analyze and address soft tissue restrictions, analyze and cue movement patterns and analyze and modify body mechanics/ergonomics to attain remaining goals. []  See Plan of Care  []  See progress note/recertification  []  See Discharge Summary         Progress towards goals / Updated goals:  Short Term Goals: To be accomplished in 2 weeks:                         0. The patient will be independent with HEP to maximize therapeutic benefit.met per patient report (6/28/2018)                         2. The patient will improve right knee extension to 0 degrees to maximize ambulation efficiency. met (7/2/2018)  Long Term Goals: To be accomplished in 4 weeks:                         1. The patient will improve FOTO score to 60 to maximize ADL ease.                         2. The patient will improve right knee flexion to 120 degrees to maximize ease of ambulation efficiency.                        4.  The patient will maximize strength of right quad to 5/5 MMT to maximize stability during ambulation.                         4. The patient will ambulate void of AD without compensations to maximize ambulation efficiency.      PLAN  []  Upgrade activities as tolerated     [x]  Continue plan of care  []  Update interventions per flow sheet       []  Discharge due to:_  []  Other:_      Namita Rasmussen, PTA 7/5/2018  10:10 AM    Future Appointments  Date Time Provider Jean Paul Louis   7/6/2018 9:00 AM Namita Rasmussen, PTA [facility]   7/9/2018 8:30 AM Jimmy Elliott, PTA [facility]   7/11/2018 9:00 AM Namita Rasmussen, PTA [facility]   7/13/2018 8:00 AM Jimmy Elliott, PTA [facility]   7/16/2018 9:00 AM Fernie Graham [facility]   7/18/2018 9:00 AM Ruben Cerda, PT [facility]   7/19/2018 8:30 AM Latrice Allen, PT [facility]   7/19/2018 9:45 AM MARY [address]

## 2018-07-05 NOTE — PROGRESS NOTES
Patient: Benita Paul  YOB: 1960       HISTORY:  The patient presents for reevaluation of her s/p right total knee arthroplasty on 6/6/18. Patient is improved, states pain is a 5 out of 10.  she has participated in outpt physical therapy. has been doing knee exercises at home. She is having some night pain. She reports it being a dull ache. She is taking BC powder and it seems to help a little. Patient denies any fever, chills, chest pain, shortness of breath or calf pain. There are no new illness or injuries to report since last seen in the office. PHYSICAL EXAMINATION:    There were no vitals taken for this visit. The patient is a well-developed, well-nourished female in no acute distress. The patient is alert and oriented times three. The patient appears to be well groomed. Mood and affect are normal.   ORTHOPEDIC EXAM of Right knee: Inspection: Effusion present,  incisions well healed  Walk: with a walker today  TTP: none  Range of motion: -0-115 flexion  Stability: Stable   Strength: 5/5  2+ distal pulses      IMPRESSION:  Status post Right total knee arthroplasty. PLAN:  Pt doing well post operatively  Stressed to patient that nothing causes an increase in pain or swelling. Patient is weight bearing as tolerated. Will continue outpt physical therapy. Patient given a refill of pain medication. 1 week of Percocet 7.5 mg to see if we can help the night pain. Patient given pain medication for short term acute pain relief. Goal is to treat patient according to above plan and to ultimately have patient off all pain medications once appropriate. If chronic pain management is required beyond what is expected for current orthopedic problem, will refer patient to pain management.  was reviewed and will be reviewed with every medication refill request.   Pt given an order for volteran gel to help with the knee discomfort as well. Patient will follow up in 2 weeks.  For xrays and ROM check     Nirmal Gasca [facility]

## 2018-07-06 ENCOUNTER — HOSPITAL ENCOUNTER (OUTPATIENT)
Dept: PHYSICAL THERAPY | Age: 58
Discharge: HOME | End: 2018-07-06
Payer: COMMERCIAL

## 2018-07-06 PROCEDURE — 97112 NEUROMUSCULAR REEDUCATION: CPT

## 2018-07-06 PROCEDURE — 97110 THERAPEUTIC EXERCISES: CPT

## 2018-07-06 PROCEDURE — 97116 GAIT TRAINING THERAPY: CPT

## 2018-07-06 NOTE — PROGRESS NOTES
PT DAILY TREATMENT NOTE     Patient Name: Robbie Painting  Date:2018  : 1960  [x]  Patient  Verified  Payor: Cullen Leigh / [place]: [place] [employer] [place] / Product Type: PPO /    In time:9:00  Out time:9:40  Total Treatment Time (min): 40  Visit #: 5 of 12    Treatment Area: Right knee pain [M25.561]    SUBJECTIVE  Pain Level (0-10 scale): 3/10  Any medication changes, allergies to medications, adverse drug reactions, diagnosis change, or new procedure performed?: [x] No    [] Yes (see summary sheet for update)  Subjective functional status/changes:   [] No changes reported  Pt reports no new complaints. \"I feel better today. \"     OBJECTIVE    24 min Therapeutic Exercise:  [x] See flow sheet :   Rationale: increase ROM and increase strength to improve the patients ability to tolerate ADLs. 8 min Manual Therapy:  PROM to right knee, patellar mobs   Rationale: decrease pain, increase ROM and increase tissue extensibility to improve tolerance to functional activities. 8 min Gait Training:  March, backward, side steppin feet each  with no device on level surfaces with SBA level of assist   Rationale: With   [] TE   [] TA   [] neuro   [] other: Patient Education: [x] Review HEP    [] Progressed/Changed HEP based on:   [] positioning   [] body mechanics   [] transfers   [] heat/ice application    [] other:      Other Objective/Functional Measures: PROM knee extension to 0 degrees. Pain Level (0-10 scale) post treatment: 3/10    ASSESSMENT/Changes in Function: Pt demonstrates limited TKE in weight bearing but is able to achieve in supine. Patient will continue to benefit from skilled PT services to modify and progress therapeutic interventions, address functional mobility deficits, address ROM deficits, address strength deficits, analyze and address soft tissue restrictions and analyze and cue movement patterns to attain remaining goals.      []  See Plan of Care  []  See progress note/recertification  []  See Discharge Summary         Progress towards goals / Updated goals:  Short Term Goals: To be accomplished in 2 weeks:                         9. The patient will be independent with HEP to maximize therapeutic benefit.met per patient report (6/28/2018)                         2. The patient will improve right knee extension to 0 degrees to maximize ambulation efficiency. met (7/2/2018)  Long Term Goals: To be accomplished in 4 weeks:                         1. The patient will improve FOTO score to 60 to maximize ADL ease.                         2. The patient will improve right knee flexion to 120 degrees to maximize ease of ambulation efficiency.                        9. The patient will maximize strength of right quad to 5/5 MMT to maximize stability during ambulation.                         4. The patient will ambulate void of AD without compensations to maximize ambulation efficiency.      PLAN  []  Upgrade activities as tolerated     [x]  Continue plan of care  []  Update interventions per flow sheet       []  Discharge due to:_  []  Other:_      Bee Laughlin, NEHA 7/6/2018  9:07 AM    Future Appointments  Date Time Provider Jean Paul Louis   7/9/2018 8:30 AM Bao Dye, PTA [facility]   7/11/2018 9:00 AM Bee Laughlin PTA [facility]   7/13/2018 8:00 AM Bao Dye, PTA [facility]   7/16/2018 9:00 AM Mirta Nash [facility]   7/18/2018 9:00 AM Edy Parker, PT [facility]   7/19/2018 8:30 AM Miguel Villanueva PT [facility]   7/19/2018 9:45 AM MARY Hart 69

## 2018-07-09 ENCOUNTER — APPOINTMENT (OUTPATIENT)
Dept: PHYSICAL THERAPY | Age: 58
End: 2018-07-09
Payer: COMMERCIAL

## 2018-07-10 ENCOUNTER — HOSPITAL ENCOUNTER (OUTPATIENT)
Dept: PHYSICAL THERAPY | Age: 58
Discharge: HOME | End: 2018-07-10
Payer: COMMERCIAL

## 2018-07-10 PROCEDURE — 97110 THERAPEUTIC EXERCISES: CPT

## 2018-07-10 PROCEDURE — 97140 MANUAL THERAPY 1/> REGIONS: CPT

## 2018-07-10 PROCEDURE — 97016 VASOPNEUMATIC DEVICE THERAPY: CPT

## 2018-07-10 NOTE — PROGRESS NOTES
PT DAILY TREATMENT NOTE     Patient Name: Laurie Ro  Date:7/10/2018  : 1960  [x]  Patient  Verified  Payor: Tima Go / [place]: [place] [employer] [place] / Product Type: PPO /    In time:9:00  Out time:9:46  Total Treatment Time (min): 55  Visit #: 6 of 12    Treatment Area: Right knee pain [M25.561]    SUBJECTIVE  Pain Level (0-10 scale): 410  Any medication changes, allergies to medications, adverse drug reactions, diagnosis change, or new procedure performed?: [x] No    [] Yes (see summary sheet for update)  Subjective functional status/changes:   [] No changes reported  \"Good days and bad days, today the knee feels really tight. \"    OBJECTIVE    Modality rationale: decrease inflammation and decrease pain to improve the patients ability to perform ADL's.    Min Type Additional Details    [] Estim:  []Unatt       []IFC  []Premod                        []Other:  []w/ice   []w/heat  Position:  Location:    [] Estim: []Att    []TENS instruct  []NMES                    []Other:  []w/US   []w/ice   []w/heat  Position:  Location:    []  Traction: [] Cervical       []Lumbar                       [] Prone          []Supine                       []Intermittent   []Continuous Lbs:  [] before manual  [] after manual    []  Ultrasound: []Continuous   [] Pulsed                           []1MHz   []3MHz W/cm2:  Location:    []  Iontophoresis with dexamethasone         Location: [] Take home patch   [] In clinic    []  Ice     []  heat  []  Ice massage  []  Laser   []  Anodyne Position:  Location:    []  Laser with stim  []  Other:  Position:  Location:   10 [x]  Vasopneumatic Device Pressure:       [x] lo [] med [] hi   Temperature: [x] lo [] med [] hi   [] Skin assessment post-treatment:  []intact []redness- no adverse reaction    []redness - adverse reaction:     28 min Therapeutic Exercise:  [x] See flow sheet :   Rationale: increase ROM, increase strength and increase proprioception to improve the patients ability to perform ADL's.    8 min Manual Therapy:  Right patella mobs, scar massage, PROM. Rationale: decrease pain, increase ROM and increase tissue extensibility to improve activity tolerance. With   [x] TE   [] TA   [] neuro   [] other: Patient Education: [x] Review HEP    [] Progressed/Changed HEP based on:   [] positioning   [] body mechanics   [] transfers   [] heat/ice application    [] other:      Other Objective/Functional Measures:      Pain Level (0-10 scale) post treatment: 4/10    ASSESSMENT/Changes in Function: FOTO improved to 63% at previous visit. Mild quad contraction, compensates with Right gluteals. Medial knee discomfort/pain at end range flexion. Patient will continue to benefit from skilled PT services to modify and progress therapeutic interventions, address functional mobility deficits, address ROM deficits, address strength deficits, analyze and address soft tissue restrictions and analyze and modify body mechanics/ergonomics to attain remaining goals. [x]  See Plan of Care  []  See progress note/recertification  []  See Discharge Summary         Progress towards goals / Updated goals:  Short Term Goals: To be accomplished in 2 weeks:                         7. The patient will be independent with HEP to maximize therapeutic benefit.met per patient report (6/28/2018)                         2. The patient will improve right knee extension to 0 degrees to maximize ambulation efficiency. met (7/2/2018)  Long Term Goals: To be accomplished in 4 weeks:                         1. The patient will improve FOTO score to 60 to maximize ADL ease. - FOTO 63%. 7/6/2018                         2. The patient will improve right knee flexion to 120 degrees to maximize ease of ambulation efficiency.                        0. The patient will maximize strength of right quad to 5/5 MMT to maximize stability during ambulation.                         4.  The patient will ambulate void of AD without compensations to maximize ambulation efficiency.      PLAN  []  Upgrade activities as tolerated     [x]  Continue plan of care  []  Update interventions per flow sheet       []  Discharge due to:_  []  Other:_      Bao Dye, NEHA 7/10/2018  8:51 AM    Future Appointments  Date Time Provider Jean Paul Louis   7/10/2018 9:00 AM [facility]   7/11/2018 9:00 AM [facility]   7/13/2018 8:00 AM [facility]   7/16/2018 9:00 AM [facility]   7/18/2018 9:00 AM [facility] Parker[facility]   7/19/2018 8:30 AM Miguel Villanueva, PT [facility]   7/19/2018 9:45 AM MARY Hart [address]

## 2018-07-11 ENCOUNTER — APPOINTMENT (OUTPATIENT)
Dept: PHYSICAL THERAPY | Age: 58
End: 2018-07-11
Payer: COMMERCIAL

## 2018-07-13 ENCOUNTER — HOSPITAL ENCOUNTER (OUTPATIENT)
Dept: PHYSICAL THERAPY | Age: 58
Discharge: HOME | End: 2018-07-13
Payer: COMMERCIAL

## 2018-07-13 PROCEDURE — 97112 NEUROMUSCULAR REEDUCATION: CPT

## 2018-07-13 PROCEDURE — 97140 MANUAL THERAPY 1/> REGIONS: CPT

## 2018-07-13 PROCEDURE — 97016 VASOPNEUMATIC DEVICE THERAPY: CPT

## 2018-07-13 PROCEDURE — 97110 THERAPEUTIC EXERCISES: CPT

## 2018-07-13 NOTE — PROGRESS NOTES
PT DAILY TREATMENT NOTE     Patient Name: Charlie Loza  Date:2018  : 1960  [x]  Patient  Verified  Payor: Peggy Medico / Plan: [place] [employer] / Product Type: PPO /    In time:8:01  Out time:8:49  Total Treatment Time (min): 48  Visit #: 7 of 12    Treatment Area: Right knee pain [M25.561]    SUBJECTIVE  Pain Level (0-10 scale): 3/10  Any medication changes, allergies to medications, adverse drug reactions, diagnosis change, or new procedure performed?: [x] No    [] Yes (see summary sheet for update)  Subjective functional status/changes:   [] No changes reported  \"Still sore behind the knee. \"    OBJECTIVE    Modality rationale: decrease inflammation and decrease pain to improve the patients ability to perform ADL's.    Min Type Additional Details    [] Estim:  []Unatt       []IFC  []Premod                        []Other:  []w/ice   []w/heat  Position:  Location:    [] Estim: []Att    []TENS instruct  []NMES                    []Other:  []w/US   []w/ice   []w/heat  Position:  Location:    []  Traction: [] Cervical       []Lumbar                       [] Prone          []Supine                       []Intermittent   []Continuous Lbs:  [] before manual  [] after manual    []  Ultrasound: []Continuous   [] Pulsed                           []1MHz   []3MHz W/cm2:  Location:    []  Iontophoresis with dexamethasone         Location: [] Take home patch   [] In clinic    []  Ice     []  heat  []  Ice massage  []  Laser   []  Anodyne Position:  Location:    []  Laser with stim  []  Other:  Position:  Location:   10 [x]  Vasopneumatic Device Pressure:       [x] lo [] med [] hi   Temperature: [x] lo [] med [] hi   [] Skin assessment post-treatment:  []intact []redness- no adverse reaction    []redness - adverse reaction:     22 min Therapeutic Exercise:  [x] See flow sheet :   Rationale: increase ROM and increase strength to improve the patients ability to perform ADL's.    8 min Neuromuscular Re-education:  - See flow sheet :   Rationale: improve coordination and increase proprioception  to improve the patients ability to perform functional activities. 8 min Manual Therapy:  Right patella mobs, scar massage, knee PROM, popliteal release. Rationale: decrease pain, increase ROM, increase tissue extensibility and decrease trigger points to improve activity tolerance. With   [x] TE   [] TA   [] neuro   [] other: Patient Education: [x] Review HEP    [] Progressed/Changed HEP based on:   [] positioning   [] body mechanics   [] transfers   [] heat/ice application    [] other:      Other Objective/Functional Measures: Pt ambulated forward, retro, lateral and marching 60' each. AROM Right knee flexion 122 degrees. Pain Level (0-10 scale) post treatment: 3/10    ASSESSMENT/Changes in Function: Met flexion LTG. Pt reports improved ease with ADL's and is no longer using AD during ambulation. Patient will continue to benefit from skilled PT services to modify and progress therapeutic interventions, address functional mobility deficits, address ROM deficits, address strength deficits, analyze and address soft tissue restrictions and analyze and modify body mechanics/ergonomics to attain remaining goals. [x]  See Plan of Care  []  See progress note/recertification  []  See Discharge Summary         Progress towards goals / Updated goals:  Short Term Goals: To be accomplished in 2 weeks:                         6. The patient will be independent with HEP to maximize therapeutic benefit.met per patient report (6/28/2018)                         2. The patient will improve right knee extension to 0 degrees to maximize ambulation efficiency. met (7/2/2018)  Long Term Goals: To be accomplished in 4 weeks:                         1. The patient will improve FOTO score to 60 to maximize ADL ease. - FOTO 63%. 7/6/2018                         2.  The patient will improve right knee flexion to 120 degrees to maximize ease of ambulation efficiency. - Met, 122 degrees. 7/13/2018                         3. The patient will maximize strength of right quad to 5/5 MMT to maximize stability during ambulation.                         4. The patient will ambulate void of AD without compensations to maximize ambulation efficiency.      PLAN  []  Upgrade activities as tolerated     [x]  Continue plan of care  []  Update interventions per flow sheet       []  Discharge due to:_  []  Other:_      Clover Aranda PTA 7/13/2018  7:51 AM    Future Appointments  Date Time Provider Jean Paul Louis   7/13/2018 8:00 AM Clover Aranda PTA [facility]   7/16/2018 9:00 AM Fabiano Padron [facility]   7/18/2018 9:00 AM Fadi Meza, PT [facility]   7/19/2018 8:30 AM Dominique Steinberg PT [facility]   7/19/2018 9:45 AM Rance Rubinstein, PA [address]

## 2018-07-16 ENCOUNTER — HOSPITAL ENCOUNTER (OUTPATIENT)
Dept: PHYSICAL THERAPY | Age: 58
Discharge: HOME | End: 2018-07-16
Payer: COMMERCIAL

## 2018-07-16 PROCEDURE — 97016 VASOPNEUMATIC DEVICE THERAPY: CPT

## 2018-07-16 PROCEDURE — 97110 THERAPEUTIC EXERCISES: CPT

## 2018-07-16 PROCEDURE — 97140 MANUAL THERAPY 1/> REGIONS: CPT

## 2018-07-16 PROCEDURE — 97112 NEUROMUSCULAR REEDUCATION: CPT

## 2018-07-16 NOTE — PROGRESS NOTES
PT DAILY TREATMENT NOTE 3-16    Patient Name: Jc Amato  Date:2018  : 1960  [x]  Patient  Verified  Payor: Shelbi Schafer / Plan: [place] [employer] [place] / Product Type: PPO /    In time:9:00  Out time:9:50  Total Treatment Time (min): 50  Visit #: 8 of 12    Treatment Area: Right knee pain [M25.561]    SUBJECTIVE  Pain Level (0-10 scale): 3  Any medication changes, allergies to medications, adverse drug reactions, diagnosis change, or new procedure performed?: [x] No    [] Yes (see summary sheet for update)  Subjective functional status/changes:   [] No changes reported  Patient reports no new complaints.      OBJECTIVE  Modality rationale: decrease edema, decrease inflammation and decrease pain to improve the patients ability to ease soreness after therapy   Min Type Additional Details    [] Estim:  []Unatt       []IFC  []Premod                        []Other:  []w/ice   []w/heat  Position:  Location:    [] Estim: []Att    []TENS instruct  []NMES                    []Other:  []w/US   []w/ice   []w/heat  Position:  Location:    []  Traction: [] Cervical       []Lumbar                       [] Prone          []Supine                       []Intermittent   []Continuous Lbs:  [] before manual  [] after manual    []  Ultrasound: []Continuous   [] Pulsed                           []1MHz   []3MHz Location:  W/cm2:    []  Iontophoresis with dexamethasone         Location: [] Take home patch   [] In clinic    []  Ice     []  heat  []  Ice massage  []  Laser   []  Anodyne Position:  Location:    []  Laser with stim  []  Other: Position:  Location:   10 [x]  Vasopneumatic Device Pressure:       [x] lo [] med [] hi   Temperature: [x] lo [] med [] hi   [] Skin assessment post-treatment:  []intact []redness- no adverse reaction    []redness - adverse reaction:     22 min Therapeutic Exercise:  [x] See flow sheet :   Rationale: increase ROM, increase strength and improve coordination to improve the patients ability to increase ease with ADLs    10 min Neuromuscular Re-education:  [x]  See flow sheet :   Rationale: increase strength, improve coordination and increase proprioception  to improve proximal hip stability     8 min Manual Therapy:  PROM right knee (flexion emphasis), scar massage, right patellar mobs   Rationale: decrease pain, increase ROM and increase tissue extensibility to ease ADL tolerance             With   [] TE   [] TA   [] neuro   [] other: Patient Education: [x] Review HEP    [] Progressed/Changed HEP based on:   [] positioning   [] body mechanics   [] transfers   [] heat/ice application    [] other:      Other Objective/Functional Measures:   Cues to decrease Trendelenburg collapse with standing hip 3 way     Pain Level (0-10 scale) post treatment: 3    ASSESSMENT/Changes in Function:   Right knee strength is improving nicely. Continues with slight antalgic gait though gradually improving. Continue to emphasize hip stability with standing exercise. Patient will continue to benefit from skilled PT services to modify and progress therapeutic interventions, address functional mobility deficits, address ROM deficits, address strength deficits, analyze and address soft tissue restrictions, analyze and cue movement patterns, analyze and modify body mechanics/ergonomics and assess and modify postural abnormalities to attain remaining goals. []  See Plan of Care  []  See progress note/recertification  []  See Discharge Summary         Progress towards goals / Updated goals:  Short Term Goals: To be accomplished in 2 weeks:                         3. The patient will be independent with HEP to maximize therapeutic benefit.met per patient report (6/28/2018)                         2. The patient will improve right knee extension to 0 degrees to maximize ambulation efficiency. met (7/2/2018)  Long Term Goals: To be accomplished in 4 weeks:                         1.  The patient will improve FOTO score to 60 to maximize ADL ease. - FOTO 63%. 7/6/2018                         2. The patient will improve right knee flexion to 120 degrees to maximize ease of ambulation efficiency. - Met, 122 degrees. 7/13/2018                         3. The patient will maximize strength of right quad to 5/5 MMT to maximize stability during ambulation. -met (7/16/2018)                         4. The patient will ambulate void of AD without compensations to maximize ambulation efficiency.      PLAN  []  Upgrade activities as tolerated     [x]  Continue plan of care  []  Update interventions per flow sheet       []  Discharge due to:_  []  Other:_      Deisy Nj 7/16/2018  8:52 AM    Future Appointments  Date Time Provider Jean Paul Louis   7/16/2018 9:00 AM Denisse Yeboah [place] [address] [facility]   7/18/2018 9:00 AM Cheikh Vera, PT [facility]   7/19/2018 8:30 AM Clari Pulido, PT [facility]   7/19/2018 9:45 AM MARY Still

## 2018-07-18 ENCOUNTER — HOSPITAL ENCOUNTER (OUTPATIENT)
Dept: PHYSICAL THERAPY | Age: 58
Discharge: HOME | End: 2018-07-18
Payer: COMMERCIAL

## 2018-07-18 PROCEDURE — 97016 VASOPNEUMATIC DEVICE THERAPY: CPT

## 2018-07-18 PROCEDURE — 97140 MANUAL THERAPY 1/> REGIONS: CPT

## 2018-07-18 PROCEDURE — 97110 THERAPEUTIC EXERCISES: CPT

## 2018-07-18 NOTE — PROGRESS NOTES
PT DAILY TREATMENT NOTE     Patient Name: Mar Jimenez  Date:2018  : 1960  [x]  Patient  Verified  Payor: Zaki Pemberton / Plan: [place] [employer] [place] / Product Type: PPO /    In time:9:00  Out time:9:44  Total Treatment Time (min): 44  Visit #: 9 of 12    Treatment Area: Right knee pain [M25.561]    SUBJECTIVE  Pain Level (0-10 scale): 5-6/10  Any medication changes, allergies to medications, adverse drug reactions, diagnosis change, or new procedure performed?: [x] No    [] Yes (see summary sheet for update)  Subjective functional status/changes:   [] No changes reported  The patient reports that her knee was sore last night around 3 in the morning and has just remained sore. OBJECTIVE  Modality rationale: decrease edema, decrease inflammation and decrease pain to improve the patients ability to improve ADL ease. Min Type Additional Details   10 [x]  Vasopneumatic Device Pressure:       [x] lo [] med [] hi   Temperature: [x] lo [] med [] hi   [] Skin assessment post-treatment:  []intact []redness- no adverse reaction    []redness - adverse reaction:     26 min Therapeutic Exercise:  [x] See flow sheet :   Rationale: increase ROM and increase strength to improve the patients ability to improve ADL ease. 8 min Manual Therapy:  PROM - scar massage; flexion/extension right knee, patellar mobs   Rationale: decrease pain, increase ROM and increase tissue extensibility to improve ADL ease. With   [] TE   [] TA   [] neuro   [] other: Patient Education: [x] Review HEP    [] Progressed/Changed HEP based on:   [] positioning   [] body mechanics   [] transfers   [] heat/ice application    [] other:      Other Objective/Functional Measures:   Ambulation; slight antalgia noted upon stance phase of gait, but nearly absent shazia cues.    Full ROM appreciated right knee    Pain Level (0-10 scale) post treatment: 3/10    ASSESSMENT/Changes in Function: All goals met with exception of slight antalgia during gait. Recommend probable D/C upon next visit, with patient being agreeable to this. Patient will continue to benefit from skilled PT services to modify and progress therapeutic interventions, address functional mobility deficits, address ROM deficits, address strength deficits, analyze and address soft tissue restrictions, analyze and cue movement patterns, analyze and modify body mechanics/ergonomics, assess and modify postural abnormalities and instruct in home and community integration to attain remaining goals. []  See Plan of Care  []  See progress note/recertification  []  See Discharge Summary         Progress towards goals / Updated goals:  Short Term Goals: To be accomplished in 2 weeks:                         3. The patient will be independent with HEP to maximize therapeutic benefit.met per patient report (6/28/2018)                         2. The patient will improve right knee extension to 0 degrees to maximize ambulation efficiency. met (7/2/2018)  Long Term Goals: To be accomplished in 4 weeks:                         1. The patient will improve FOTO score to 60 to maximize ADL ease. - FOTO 63%. 7/6/2018                         2. The patient will improve right knee flexion to 120 degrees to maximize ease of ambulation efficiency. - Met, 122 degrees. 7/13/2018                         3. The patient will maximize strength of right quad to 5/5 MMT to maximize stability during ambulation. -met (7/16/2018)                         4. The patient will ambulate void of AD without compensations to maximize ambulation efficiency.  Slight antalgia appreciated, near [facility] 7/18/2018    PLAN  []  Upgrade activities as tolerated     [x]  Continue plan of care  []  Update interventions per flow sheet       []  Discharge due to:_  []  Other:_      Brent Pereira PT 7/18/2018  9:15 AM    Future Appointments  Date Time Provider Jean Paul Louis   7/19/2018 8:30 AM [facility]   7/19/2018 9:45 AM MARY Trejo [address]

## 2018-07-19 ENCOUNTER — HOSPITAL ENCOUNTER (OUTPATIENT)
Dept: PHYSICAL THERAPY | Age: 58
Discharge: HOME | End: 2018-07-19
Payer: COMMERCIAL

## 2018-07-19 ENCOUNTER — OFFICE VISIT (OUTPATIENT)
Dept: ORTHOPEDIC SURGERY | Age: 58
End: 2018-07-19

## 2018-07-19 VITALS
SYSTOLIC BLOOD PRESSURE: 128 MMHG | OXYGEN SATURATION: 98 % | BODY MASS INDEX: 29.25 KG/M2 | TEMPERATURE: 97.7 F | HEART RATE: 72 BPM | WEIGHT: 182 LBS | DIASTOLIC BLOOD PRESSURE: 76 MMHG | RESPIRATION RATE: 16 BRPM | HEIGHT: 66 IN

## 2018-07-19 DIAGNOSIS — Z96.651 S/P TOTAL KNEE REPLACEMENT, RIGHT: Primary | ICD-10-CM

## 2018-07-19 PROCEDURE — 97016 VASOPNEUMATIC DEVICE THERAPY: CPT

## 2018-07-19 PROCEDURE — 97140 MANUAL THERAPY 1/> REGIONS: CPT

## 2018-07-19 PROCEDURE — 97110 THERAPEUTIC EXERCISES: CPT

## 2018-07-19 NOTE — PROGRESS NOTES
Patient: Lesley Staples  YOB: 1960       HISTORY:  The patient presents for reevaluation of her s/p right total knee arthroplasty on 6/6/18. Patient is improved, states pain is a 4 out of 10.  she has participated in outpt physical therapy. She has finished at this point and they transitioned her to an HEP. has been doing knee exercises at home. She still has achy pain occasionally but it is manageable. She is ready to go back to work. She is scheduled to go back 8/7/18. She is a  and she feels she will be ready to go back in 8/7/18. Patient denies any fever, chills, chest pain, shortness of breath or calf pain. There are no new illness or injuries to report since last seen in the office. PHYSICAL EXAMINATION:    Visit Vitals    /76    Pulse 72    Temp 97.7 °F (36.5 °C) (Oral)    Resp 16    Ht 5' 6\" (1.676 m)    Wt 182 lb (82.6 kg)    SpO2 98%    BMI 29.38 kg/m2     The patient is a well-developed, well-nourished female in no acute distress. The patient is alert and oriented times three. The patient appears to be well groomed. Mood and affect are normal.   ORTHOPEDIC EXAM of Right knee: Inspection: Effusion present,  incisions well healed  Walk: no assistive device  TTP: none  Range of motion: 0-125 flexion  Stability: Stable   Strength: 5/5  2+ distal pulses      IMPRESSION:  Status post Right total knee arthroplasty. PLAN:  Pt doing well post operatively  Stressed to patient that nothing causes an increase in pain or swelling. Patient is weight bearing as tolerated. She has finished PT at this point and will continue to work on her exercises at home. Patient given a refill of pain medication. Patient will follow up in 4 weeks.      Nirmal Lucero [facility]

## 2018-07-19 NOTE — PROGRESS NOTES
[facility]  [address]  [place], [address]  [phone] fax    Physical Therapy Discharge Summary  Patient name: Corina Stone Start of Care: 2018   Referral source: An Masalima,* : 1960                          Medical Diagnosis: Presence of right artificial knee joint [Z96.651] Onset Date:2018                          Treatment Diagnosis: Right TKR   Prior Hospitalization: see medical history Provider#: [id number]   Medications: Verified on Patient summary List    Comorbidities: Arthritis, Right TKR   Prior Level of Function: The patient used a SPC at times and had difficulty sleeping, pain with ambulation and limited stair negotiation prior to onset. Visits from Start of Care: 8    Missed Visits: -  Reporting Period : 19 to 18      Summary of Care: pt has progressed well with PT as noted below. She is now ready for discharge and should continue with HEP. Progress towards goals / Updated goals:  Short Term Goals: To be accomplished in 2 weeks:                         1. The patient will be independent with HEP to maximize therapeutic benefit.met per patient report (2018)                         2. The patient will improve right knee extension to 0 degrees to maximize ambulation efficiency. met (2018)  Long Term Goals: To be accomplished in 4 weeks:                         1. The patient will improve FOTO score to 60 to maximize ADL ease. - FOTO 63%. 2018                         2. The patient will improve right knee flexion to 120 degrees to maximize ease of ambulation efficiency. - Met, 122 degrees. 2018                         3. The patient will maximize strength of right quad to 5/5 MMT to maximize stability during ambulation. -met (2018)                         4. The patient will ambulate void of AD without compensations to maximize ambulation efficiency.  Slight antalgia appreciated, near [facility] 7/18/2018      ASSESSMENT/RECOMMENDATIONS:  [x]Discontinue therapy: [x]Patient has reached or is progressing toward set goals      []Patient is non-compliant or has abdicated      []Due to lack of appreciable progress towards set Daisha 71, PT 7/19/2018 9:42 AM

## 2018-07-20 ENCOUNTER — APPOINTMENT (OUTPATIENT)
Dept: PHYSICAL THERAPY | Age: 58
End: 2018-07-20
Payer: COMMERCIAL

## 2018-08-02 ENCOUNTER — DOCUMENTATION ONLY (OUTPATIENT)
Dept: ORTHOPEDIC SURGERY | Age: 58
End: 2018-08-02

## 2018-08-02 NOTE — PROGRESS NOTES
Patient dropped off  Fit for Duty and FMLA forms at the [facility] location. Patient was released to return to work on 8/7/18 but states her employer will not allow her to return as they do not feel she is completely healed. Please call patient to  when complete.

## 2018-08-03 ENCOUNTER — OFFICE VISIT (OUTPATIENT)
Dept: ORTHOPEDIC SURGERY | Age: 58
End: 2018-08-03

## 2018-08-03 VITALS
DIASTOLIC BLOOD PRESSURE: 79 MMHG | SYSTOLIC BLOOD PRESSURE: 147 MMHG | TEMPERATURE: 97.4 F | HEART RATE: 77 BPM | WEIGHT: 183 LBS | BODY MASS INDEX: 29.41 KG/M2 | OXYGEN SATURATION: 98 % | RESPIRATION RATE: 16 BRPM | HEIGHT: 66 IN

## 2018-08-03 DIAGNOSIS — M79.661 RIGHT CALF PAIN: ICD-10-CM

## 2018-08-03 DIAGNOSIS — Z96.651 S/P TOTAL KNEE REPLACEMENT, RIGHT: Primary | ICD-10-CM

## 2018-08-03 NOTE — PROGRESS NOTES
Patient: Mar Jimenez  YOB: 1960       HISTORY:  The patient presents for reevaluation of her s/p right total knee arthroplasty on 6/6/18. Patient is improved, states pain is a 4 out of 10.  she has participated in outpt physical therapy. She has finished at this point and they transitioned her to an HEP. has been doing knee exercises at home. She went to Occup[employer] Health to be released back to work and because she was limping still they denied her. She was told she had to be cleared by us. She reports her calf is tight and sore which causes her to limp. She says with the calf pain she does not feel ready to go back to work. Patient denies any fever, chills, chest pain, shortness of breath. There are no new illness or injuries to report since last seen in the office. PHYSICAL EXAMINATION:    Visit Vitals    /79    Pulse 77    Temp 97.4 °F (36.3 °C) (Oral)    Resp 16    Ht 5' 6\" (1.676 m)    Wt 183 lb (83 kg)    SpO2 98%    BMI 29.54 kg/m2     The patient is a well-developed, well-nourished female in no acute distress. The patient is alert and oriented times three. The patient appears to be well groomed. Mood and affect are normal.   ORTHOPEDIC EXAM of Right knee: Inspection: Effusion not present,  incisions well healed  Walk: no assistive device  TTP: none  Range of motion: 0-125 flexion  Stability: Stable   Strength: 5/5  2+ distal pulses      IMPRESSION:  Status post Right total knee arthroplasty. PLAN:  Pt having right calf pain post operatively  Will order a STAT doppler to r/o DVT right lower extremity  Stressed to patient that nothing causes an increase in pain or swelling. Patient is weight bearing as tolerated. She has finished PT at this point and will continue to work on her exercises at home. She was given a work note to stay out for another 3 weeks   Patient not given a refill of pain medication. Patient will follow up in 3 weeks.      Diandra GRIJALVA [facility]

## 2018-08-03 NOTE — LETTER
NOTIFICATION RETURN TO WORK / SCHOOL 
 
8/3/2018 11:45 AM 
 
Ms. Charlie Loza [place] [address] To Whom It May Concern: 
 
Charlie Loza is currently under the care of [facility]. She will remain out of work for another 3 weeks. She will be reevaluated during that time. If there are questions or concerns please have the patient contact our office.  
 
 
 
Sincerely, 
 
 
MARY Munoz

## 2018-08-24 ENCOUNTER — OFFICE VISIT (OUTPATIENT)
Dept: ORTHOPEDIC SURGERY | Age: 58
End: 2018-08-24

## 2018-08-24 VITALS
OXYGEN SATURATION: 99 % | RESPIRATION RATE: 16 BRPM | WEIGHT: 182.4 LBS | SYSTOLIC BLOOD PRESSURE: 133 MMHG | HEART RATE: 87 BPM | BODY MASS INDEX: 29.32 KG/M2 | DIASTOLIC BLOOD PRESSURE: 82 MMHG | HEIGHT: 66 IN

## 2018-08-24 DIAGNOSIS — Z96.651 S/P TOTAL KNEE REPLACEMENT, RIGHT: Primary | ICD-10-CM

## 2018-08-24 NOTE — PROGRESS NOTES
Patient: Malcom Cr  YOB: 1960       HISTORY:  The patient presents for reevaluation of her s/p right total knee arthroplasty on 6/6/18. Patient is improved, states pain is a 4 out of 10.  she has participated in outpt physical therapy. She has finished at this point and they transitioned her to an HEP. has been doing knee exercises at home. She is feeling much better since last visit and is ready to go back to work. Patient denies any fever, chills, chest pain, shortness of breath or calf pain. There are no new illness or injuries to report since last seen in the office. PHYSICAL EXAMINATION:    Visit Vitals    /82    Pulse 87    Resp 16    Ht 5' 6\" (1.676 m)    Wt 182 lb 6.4 oz (82.7 kg)    SpO2 99%    BMI 29.44 kg/m2     The patient is a well-developed, well-nourished female in no acute distress. The patient is alert and oriented times three. The patient appears to be well groomed. Mood and affect are normal.   ORTHOPEDIC EXAM of Right knee: Inspection: Effusion not present,  incisions well healed  Walk: no assistive device  TTP: none  Range of motion: 0-125 flexion  Stability: Stable   Strength: 5/5  2+ distal pulses      IMPRESSION:  Status post Right total knee arthroplasty. PLAN:  Pt doing well post operatively  Doppler study neg for DVT  Patient is weight bearing as tolerated. She has finished PT at this point and will continue to work on her exercises at home. She was given a work note to go back to work 8/29/18 with light duty for 3 weeks no lifting heavier then 5 lbs  Patient not given a refill of pain medication. Patient will follow up in 3 months.      [facility]

## 2018-08-24 NOTE — LETTER
NOTIFICATION RETURN TO WORK / SCHOOL 
 
8/24/2018 9:08 AM 
 
Ms. Kaylynn Dinero [address] [facility] [address] To Whom It May Concern: 
 
Kaylynn Dinero is currently under the care of [address] [place] [address] Dereje Aguilar. She will return to work/school on: 8/29/18 light duty - no lifting heavier then 5 lbs for 3 weeks. If there are questions or concerns please have the patient contact our office.  
 
 
 
Sincerely, 
 
 
MARY Craft

## 2019-06-21 ENCOUNTER — HOSPITAL ENCOUNTER (OUTPATIENT)
Dept: MAMMOGRAPHY | Age: 59
Discharge: HOME | End: 2019-06-21
Attending: INTERNAL MEDICINE
Payer: COMMERCIAL

## 2019-06-21 DIAGNOSIS — Z12.31 VISIT FOR SCREENING MAMMOGRAM: ICD-10-CM

## 2019-06-21 PROCEDURE — 77067 SCR MAMMO BI INCL CAD: CPT

## 2019-07-25 ENCOUNTER — OFFICE VISIT (OUTPATIENT)
Dept: ORTHOPEDIC SURGERY | Facility: CLINIC | Age: 59
End: 2019-07-25

## 2019-07-25 ENCOUNTER — APPOINTMENT (OUTPATIENT)
Dept: LAB | Age: 59
End: 2019-07-25
Payer: COMMERCIAL

## 2019-07-25 ENCOUNTER — HOSPITAL ENCOUNTER (OUTPATIENT)
Dept: LAB | Age: 59
Discharge: HOME | End: 2019-07-25
Payer: COMMERCIAL

## 2019-07-25 VITALS
OXYGEN SATURATION: 99 % | DIASTOLIC BLOOD PRESSURE: 61 MMHG | HEART RATE: 70 BPM | TEMPERATURE: 97.1 F | BODY MASS INDEX: 29.89 KG/M2 | RESPIRATION RATE: 18 BRPM | SYSTOLIC BLOOD PRESSURE: 140 MMHG | WEIGHT: 186 LBS | HEIGHT: 66 IN

## 2019-07-25 DIAGNOSIS — Z96.651 H/O TOTAL KNEE REPLACEMENT, RIGHT: Primary | ICD-10-CM

## 2019-07-25 DIAGNOSIS — Z96.651 H/O TOTAL KNEE REPLACEMENT, RIGHT: ICD-10-CM

## 2019-07-25 LAB
BASOPHILS # BLD: 0 K/UL (ref 0–0.06)
BASOPHILS NFR BLD: 0 % (ref 0–3)
CRP SERPL-MCNC: 1.1 MG/DL (ref 0–0.3)
DIFFERENTIAL METHOD BLD: ABNORMAL
EOSINOPHIL # BLD: 0.1 K/UL (ref 0–0.4)
EOSINOPHIL NFR BLD: 1 % (ref 0–5)
ERYTHROCYTE [DISTWIDTH] IN BLOOD BY AUTOMATED COUNT: 13.9 % (ref 11.6–14.5)
ERYTHROCYTE [SEDIMENTATION RATE] IN BLOOD: 18 MM/HR (ref 0–30)
HCT VFR BLD AUTO: 39.4 % (ref 35–45)
HGB BLD-MCNC: 13.1 G/DL (ref 12–16)
LYMPHOCYTES # BLD: 1.5 K/UL (ref 0.8–3.5)
LYMPHOCYTES NFR BLD: 25 % (ref 20–51)
MCH RBC QN AUTO: 28.3 PG (ref 24–34)
MCHC RBC AUTO-ENTMCNC: 33.2 G/DL (ref 31–37)
MCV RBC AUTO: 85.1 FL (ref 74–97)
METAMYELOCYTES NFR BLD MANUAL: 1 %
MONOCYTES # BLD: 0.4 K/UL (ref 0–1)
MONOCYTES NFR BLD: 6 % (ref 2–9)
NEUTS BAND NFR BLD MANUAL: 1 % (ref 0–5)
NEUTS SEG # BLD: 4.1 K/UL (ref 1.8–8)
NEUTS SEG NFR BLD: 66 % (ref 42–75)
PLATELET # BLD AUTO: 324 K/UL (ref 135–420)
PLATELET COMMENTS,PCOM: ABNORMAL
PMV BLD AUTO: 9.7 FL (ref 9.2–11.8)
RBC # BLD AUTO: 4.63 M/UL (ref 4.2–5.3)
RBC MORPH BLD: ABNORMAL
URATE SERPL-MCNC: 5.3 MG/DL (ref 2.6–7.2)
WBC # BLD AUTO: 6.1 K/UL (ref 4.6–13.2)

## 2019-07-25 PROCEDURE — 86140 C-REACTIVE PROTEIN: CPT

## 2019-07-25 PROCEDURE — 85025 COMPLETE CBC W/AUTO DIFF WBC: CPT

## 2019-07-25 PROCEDURE — 83520 IMMUNOASSAY QUANT NOS NONAB: CPT

## 2019-07-25 PROCEDURE — 84550 ASSAY OF BLOOD/URIC ACID: CPT

## 2019-07-25 PROCEDURE — 36415 COLL VENOUS BLD VENIPUNCTURE: CPT

## 2019-07-25 PROCEDURE — 85652 RBC SED RATE AUTOMATED: CPT

## 2019-07-25 NOTE — PROGRESS NOTES
Patient: Elvin Lee                MRN: [id number]       SSN: [id number]  YOB: 1960        AGE: 61 y.o. SEX: female  Body mass index is 30.02 kg/m². PCP: Sulaiman Martinez MD  07/25/19      HISTORY:  I had the pleasure of reviewing the patient today. I do appreciate the opportunity. She is a pleasant lady who had a total knee replacement done in 06/2018 by Dr. Tyrese Beltran. She has a Stentysuy Attune knee replacement rotating platform. 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 It has been slowly worsening. She has noticed some swelling with it. No fevers or chills. No complications after the surgery. The pain is non-radicular and usually in the midline but it is difficult to point. She notices that when she first stands up, it can be a little bit difficult and then it eases off and she able to walk a little bit more. She does notice a little bit of wobbliness within the knee but not severely so. She denies fevers or chills. Otherwise she has been feeling well. The 12-point review of systems is performed today. Pertinent positives documented on the EMR and all other systems are reviewed are negative. PHYSICAL EXAMINATION:  On the examination today, she is a very pleasant lady. She has just a touch of start-up discomfort. There is some mid-flexion instability. The hips rotate nicely. Both feet are warm and well perfused. The knee is not hot or red. There is a mild effusion that is present. There is mild medial and lateral joint line tenderness but not severely so. Calf nontender. Patella seems to be tracking nicely. The hips rotate well. No cyanosis, peripheral edema or clubbing. RADIOGRAPHS:   On review of the x-rays she has an Attune knee replacement which appears to be in good positioning and alignment. There does appear to be a lucent line between the cement bone interface on the tibia, especially on the AP view.   On the lateral view there is a cement metal defect posteriorly. PLAN:  I am going to get some infectious labs and also a Technetium bone scan to look at the tibia. This is something that we will have to follow closely. Having said this, I am suspicious that there may be some early changes on the tibia. We will see her back after the scan and the blood work. I also explained that the type of housekeeping work she does can be hard on total knees and some patients have trouble performing those duties on a regular basis. CC:  Dr. Colby Tavera,        REVIEW OF SYSTEMS:      CON: negative for weight loss, fever  EYE: negative for double vision  ENT: negative for hoarseness  RS:   negative for Tb  GI:    negative for blood in stool  :  negative for blood in urine  Other systems reviewed and noted below. Past Medical History:   Diagnosis Date    Arthritis        History reviewed. No pertinent family history. Current Outpatient Medications   Medication Sig Dispense Refill    Ice Bag misc Apply polar care to right knee every day while awake. 1 Each 0    methocarbamol (ROBAXIN) 500 mg tablet Take 1 Tab by mouth three (3) times daily as needed. (Patient taking differently: Take 1 Tab by mouth three (3) times daily as needed (muscle spasm). ) 30 Tab 0    VITAMIN D2 50,000 unit capsule take 1 capsule by mouth every week  0    hydroCHLOROthiazide (HYDRODIURIL) 25 mg tablet take 1 tablet by mouth once daily  0    Omeprazole delayed release (PRILOSEC D/R) 20 mg tablet take 1 tablet by mouth once daily  0    simvastatin (ZOCOR) 20 mg tablet take 1 tablet by mouth at bedtime  0    diclofenac (VOLTAREN) 1 % gel Apply 4 g to affected area four (4) times daily. 100 g 2    oxyCODONE-acetaminophen (PERCOCET) 5-325 mg per tablet Take 1 Tab by mouth every four (4) hours as needed for Pain. Max Daily Amount: 6 Tabs.  40 Tab 0       No Known Allergies    Past Surgical History:   Procedure Laterality Date    HX KNEE ARTHROSCOPY         Social History     Socioeconomic History    Marital status: LEGALLY      Spouse name: Not on file    Number of children: Not on file    Years of education: Not on file    Highest education level: Not on file   Occupational History    Not on file   Social Needs    Financial resource strain: Not on file    Food insecurity:     Worry: Not on file     Inability: Not on file    Transportation needs:     Medical: Not on file     Non-medical: Not on file   Tobacco Use    Smoking status: Never Smoker    Smokeless tobacco: Never Used   Substance and Sexual Activity    Alcohol use: No    Drug use: No    Sexual activity: Not on file   Lifestyle    Physical activity:     Days per week: Not on file     Minutes per session: Not on file    Stress: Not on file   Relationships    Social connections:     Talks on phone: Not on file     Gets together: Not on file     Attends Pentecostal service: Not on file     Active member of club or organization: Not on file     Attends meetings of clubs or organizations: Not on file     Relationship status: Not on file    Intimate partner violence:     Fear of current or ex partner: Not on file     Emotionally abused: Not on file     Physically abused: Not on file     Forced sexual activity: Not on file   Other Topics Concern    Not on file   Social History Narrative    Not on file       Visit Vitals  /61   Pulse 70   Temp 97.1 °F (36.2 °C) (Oral)   Resp 18   Ht 5' 6\" (1.676 m)   Wt 84.4 kg (186 lb)   SpO2 99%   BMI 30.02 kg/m²         PHYSICAL EXAMINATION:  GENERAL: Alert and oriented x3, in no acute distress, well-developed, well-nourished, afebrile. HEART: No JVD. EYES: No scleral icterus   NECK: No significant lymphadenopathy   LUNGS: No respiratory compromise or indrawing  ABDOMEN: Soft, non-tender, non-distended. Electronically signed by:  Bianca Kanner, MD

## 2019-07-30 LAB — IL6 SERPL-MCNC: 7.2 PG/ML (ref 0–15.5)

## 2019-08-09 ENCOUNTER — HOSPITAL ENCOUNTER (OUTPATIENT)
Dept: NUCLEAR MEDICINE | Age: 59
Discharge: HOME | End: 2019-08-09
Attending: ORTHOPAEDIC SURGERY
Payer: COMMERCIAL

## 2019-08-09 DIAGNOSIS — Z96.651 H/O TOTAL KNEE REPLACEMENT, RIGHT: ICD-10-CM

## 2019-08-09 PROCEDURE — 78315 BONE IMAGING 3 PHASE: CPT

## 2019-08-23 ENCOUNTER — OFFICE VISIT (OUTPATIENT)
Dept: ORTHOPEDIC SURGERY | Age: 59
End: 2019-08-23

## 2019-08-23 VITALS
OXYGEN SATURATION: 100 % | HEART RATE: 65 BPM | BODY MASS INDEX: 30.05 KG/M2 | RESPIRATION RATE: 16 BRPM | SYSTOLIC BLOOD PRESSURE: 146 MMHG | WEIGHT: 187 LBS | TEMPERATURE: 96.4 F | DIASTOLIC BLOOD PRESSURE: 78 MMHG | HEIGHT: 66 IN

## 2019-08-23 DIAGNOSIS — T84.012D FAILED TOTAL RIGHT KNEE REPLACEMENT, SUBSEQUENT ENCOUNTER: Primary | ICD-10-CM

## 2019-08-23 NOTE — PROGRESS NOTES
Patient: Tricia Gaytan                MRN: [id number]       SSN: [id number]  YOB: 1960        AGE: 61 y.o. SEX: female  Body mass index is 30.18 kg/m². PCP: Karina Islas MD  08/23/19    HISTORY:  The patient is here today in follow-up for reevaluation for right knee pain. If you remember, she had a knee replacement done a year ago by Dr. Margarita Larsen. It is a DePuy Attune knee, original generation of base plate. She has start-up pain. It hurts her when she first stands up, and then after walking for a little while, it gets worse again. She denies fevers or chills. Things are slowly progressive and getting worse. It is interfering with her work as well, as a . The pain can be moderate and some days are worse. She denies radicular pain. She denies fevers, chills, night sweats, or weight loss. In fact, a 12-point review of systems, pertinent positives documented. All other systems are reviewed and are otherwise negative. PHYSICAL EXAMINATION:  On examination today, she has start-up pain when she ambulates. There is significant mid-flexion instability associated with this. There is a nicely healed incision. Good motion. There is slight swelling. It does not appear to be infected. Calf nontender. Homans sign negative. No foot drop. The hips rotate nicely. PLAN:  Review of the x-rays, bone scan, and blood work all confirm loosening of the tibial component and possibly femoral component as well. I recommend the full revision for her. She understands that the knee will not be 100% pain free. Some pain is expected, even after it has healed. We should be able to fix the instability problem and start-up pain. Some patients have difficulty doing strenuous work after joint revision surgery. Also, infection, DVT, pulmonary embolism, anesthetic complications, arthrofibrosis, recurrent infection and other problems.   All questions were answered and she would like to proceed. I think she will do quite well with surgery. We are going to put her lighter duties. She may end up having permanent restrictions after the revision as well. REVIEW OF SYSTEMS:      CON: negative for weight loss, fever  EYE: negative for double vision  ENT: negative for hoarseness  RS:   negative for Tb  GI:    negative for blood in stool  :  negative for blood in urine  Other systems reviewed and noted below. Past Medical History:   Diagnosis Date    Arthritis        History reviewed. No pertinent family history. Current Outpatient Medications   Medication Sig Dispense Refill    aspirin/salicylamide/caffeine (ARTHRITIS STRENGTH BC POWDER PO) Take  by mouth.  Ice SocialStay Apply polar care to right knee every day while awake. 1 Each 0    methocarbamol (ROBAXIN) 500 mg tablet Take 1 Tab by mouth three (3) times daily as needed. (Patient taking differently: Take 1 Tab by mouth three (3) times daily as needed (muscle spasm). ) 30 Tab 0    VITAMIN D2 50,000 unit capsule take 1 capsule by mouth every week  0    hydroCHLOROthiazide (HYDRODIURIL) 25 mg tablet take 1 tablet by mouth once daily  0    Omeprazole delayed release (PRILOSEC D/R) 20 mg tablet take 1 tablet by mouth once daily  0    simvastatin (ZOCOR) 20 mg tablet take 1 tablet by mouth at bedtime  0    diclofenac (VOLTAREN) 1 % gel Apply 4 g to affected area four (4) times daily. 100 g 2    oxyCODONE-acetaminophen (PERCOCET) 5-325 mg per tablet Take 1 Tab by mouth every four (4) hours as needed for Pain. Max Daily Amount: 6 Tabs.  40 Tab 0       No Known Allergies    Past Surgical History:   Procedure Laterality Date    HX KNEE ARTHROSCOPY         Social History     Socioeconomic History    Marital status: LEGALLY      Spouse name: Not on file    Number of children: Not on file    Years of education: Not on file    Highest education level: Not on file   Occupational History    Not on file Social Needs    Financial resource strain: Not on file    Food insecurity:     Worry: Not on file     Inability: Not on file    Transportation needs:     Medical: Not on file     Non-medical: Not on file   Tobacco Use    Smoking status: Never Smoker    Smokeless tobacco: Never Used   Substance and Sexual Activity    Alcohol use: No    Drug use: No    Sexual activity: Not on file   Lifestyle    Physical activity:     Days per week: Not on file     Minutes per session: Not on file    Stress: Not on file   Relationships    Social connections:     Talks on phone: Not on file     Gets together: Not on file     Attends Synagogue service: Not on file     Active member of club or organization: Not on file     Attends meetings of clubs or organizations: Not on file     Relationship status: Not on file    Intimate partner violence:     Fear of current or ex partner: Not on file     Emotionally abused: Not on file     Physically abused: Not on file     Forced sexual activity: Not on file   Other Topics Concern    Not on file   Social History Narrative    Not on file       Visit Vitals  /78 (BP 1 Location: Left arm, BP Patient Position: Sitting)   Pulse 65   Temp 96.4 °F (35.8 °C) (Oral)   Resp 16   Ht 5' 6\" (1.676 m)   Wt 187 lb (84.8 kg)   SpO2 100%   BMI 30.18 kg/m²         PHYSICAL EXAMINATION:  GENERAL: Alert and oriented x3, in no acute distress, well-developed, well-nourished, afebrile. HEART: No JVD. EYES: No scleral icterus   NECK: No significant lymphadenopathy   LUNGS: No respiratory compromise or indrawing  ABDOMEN: Soft, non-tender, non-distended. Electronically signed by: Christiano Rios MD          Patient: Gretchen Malik                MRN: [id number]       SSN: [id number]  YOB: 1960        AGE: 61 y.o. SEX: female  Body mass index is 30.18 kg/m².     PCP: Marisabel Polanco MD  08/23/19        REVIEW OF SYSTEMS:      CON: negative for weight loss, fever  EYE: negative for double vision  ENT: negative for hoarseness  RS:   negative for Tb  GI:    negative for blood in stool  :  negative for blood in urine  Other systems reviewed and noted below. Past Medical History:   Diagnosis Date    Arthritis        History reviewed. No pertinent family history. Current Outpatient Medications   Medication Sig Dispense Refill    aspirin/salicylamide/caffeine (ARTHRITIS STRENGTH BC POWDER PO) Take  by mouth.  Ice Z[place]rina [place] Apply polar care to right knee every day while awake. 1 Each 0    methocarbamol (ROBAXIN) 500 mg tablet Take 1 Tab by mouth three (3) times daily as needed. (Patient taking differently: Take 1 Tab by mouth three (3) times daily as needed (muscle spasm). ) 30 Tab 0    VITAMIN D2 50,000 unit capsule take 1 capsule by mouth every week  0    hydroCHLOROthiazide (HYDRODIURIL) 25 mg tablet take 1 tablet by mouth once daily  0    Omeprazole delayed release (PRILOSEC D/R) 20 mg tablet take 1 tablet by mouth once daily  0    simvastatin (ZOCOR) 20 mg tablet take 1 tablet by mouth at bedtime  0    diclofenac (VOLTAREN) 1 % gel Apply 4 g to affected area four (4) times daily. 100 g 2    oxyCODONE-acetaminophen (PERCOCET) 5-325 mg per tablet Take 1 Tab by mouth every four (4) hours as needed for Pain. Max Daily Amount: 6 Tabs.  40 Tab 0       No Known Allergies    Past Surgical History:   Procedure Laterality Date    HX KNEE ARTHROSCOPY         Social History     Socioeconomic History    Marital status: LEGALLY      Spouse name: Not on file    Number of children: Not on file    Years of education: Not on file    Highest education level: Not on file   Occupational History    Not on file   Social Needs    Financial resource strain: Not on file    Food insecurity:     Worry: Not on file     Inability: Not on file    Transportation needs:     Medical: Not on file     Non-medical: Not on file   Tobacco Use    Smoking status: Never Smoker    Smokeless tobacco: Never Used   Substance and Sexual Activity    Alcohol use: No    Drug use: No    Sexual activity: Not on file   Lifestyle    Physical activity:     Days per week: Not on file     Minutes per session: Not on file    Stress: Not on file   Relationships    Social connections:     Talks on phone: Not on file     Gets together: Not on file     Attends Advent service: Not on file     Active member of club or organization: Not on file     Attends meetings of clubs or organizations: Not on file     Relationship status: Not on file    Intimate partner violence:     Fear of current or ex partner: Not on file     Emotionally abused: Not on file     Physically abused: Not on file     Forced sexual activity: Not on file   Other Topics Concern    Not on file   Social History Narrative    Not on file       Visit Vitals  /78 (BP 1 Location: Left arm, BP Patient Position: Sitting)   Pulse 65   Temp 96.4 °F (35.8 °C) (Oral)   Resp 16   Ht 5' 6\" (1.676 m)   Wt 187 lb (84.8 kg)   SpO2 100%   BMI 30.18 kg/m²         PHYSICAL EXAMINATION:  GENERAL: Alert and oriented x3, in no acute distress, well-developed, well-nourished, afebrile. HEART: No JVD. EYES: No scleral icterus   NECK: No significant lymphadenopathy   LUNGS: No respiratory compromise or indrawing  ABDOMEN: Soft, non-tender, non-distended. Electronically signed by:  Kelly Bundy MD

## 2019-08-23 NOTE — LETTER
NOTIFICATION OF RETURN TO WORK / SCHOOL 
 
8/23/2019 8:43 AM 
 
Ms. Nic Geiger [place] [address] [place] To Whom It May Concern: 
 
Nic Flood is under the care of [address] [place] [address] Dereje Aguilar. Due to her medical condition, we recommend she remain on light duties at work. If there are questions or concerns please have the patient contact our office. Sincerely, Bianca Kanner, MD

## 2019-08-23 NOTE — PROGRESS NOTES
1. Have you been to the ER, urgent care clinic since your last visit? Hospitalized since your last visit? NO    2. Have you seen or consulted any other health care providers outside of the [address] [place] [address] since your last visit? Include any pap smears or colon screening.  NO

## 2019-08-23 NOTE — LETTER
NOTIFICATION RETURN TO WORK / SCHOOL 
 
8/23/2019 8:46 AM 
 
Ms. Nic Geiger [address] [place] [address] To Whom It May Concern: 
 
Nic Flood is currently under the care of [address] [place] [address] Dereje Aguilar. Due to her medical condition, we recommend she remain on light duties at work until further notice. If there are questions or concerns please have the patient contact our office. Sincerely, Bianca Kanner, MD

## 2019-09-26 DIAGNOSIS — Z01.818 PREOP EXAMINATION: Primary | ICD-10-CM

## 2019-09-26 DIAGNOSIS — T84.012A FAILED TOTAL RIGHT KNEE REPLACEMENT, INITIAL ENCOUNTER (HCC): ICD-10-CM

## 2019-09-30 ENCOUNTER — DOCUMENTATION ONLY (OUTPATIENT)
Dept: ORTHOPEDIC SURGERY | Age: 59
End: 2019-09-30

## 2019-09-30 NOTE — PROGRESS NOTES
Form was received via fax from [address] [place] [address] was placed in the forms bin on 9-30-19. Please complete and call patient for .

## 2019-10-09 ENCOUNTER — HOSPITAL ENCOUNTER (OUTPATIENT)
Dept: GENERAL RADIOLOGY | Age: 59
Discharge: HOME | End: 2019-10-09
Payer: COMMERCIAL

## 2019-10-09 ENCOUNTER — HOSPITAL ENCOUNTER (OUTPATIENT)
Dept: PREADMISSION TESTING | Age: 59
Discharge: HOME | End: 2019-10-09
Payer: COMMERCIAL

## 2019-10-09 DIAGNOSIS — Z01.818 PREOP EXAMINATION: ICD-10-CM

## 2019-10-09 DIAGNOSIS — T84.012A FAILED TOTAL RIGHT KNEE REPLACEMENT, INITIAL ENCOUNTER (HCC): ICD-10-CM

## 2019-10-09 LAB
ABO + RH BLD: NORMAL
ALBUMIN SERPL-MCNC: 4 G/DL (ref 3.4–5)
ANION GAP SERPL CALC-SCNC: 2 MMOL/L (ref 3–18)
APPEARANCE UR: ABNORMAL
APTT PPP: 28.2 SEC (ref 23–36.4)
BASOPHILS # BLD: 0 K/UL (ref 0–0.1)
BASOPHILS NFR BLD: 0 % (ref 0–2)
BILIRUB UR QL: NEGATIVE
BLOOD GROUP ANTIBODIES SERPL: NORMAL
BUN SERPL-MCNC: 9 MG/DL (ref 7–18)
BUN/CREAT SERPL: 11 (ref 12–20)
CALCIUM SERPL-MCNC: 9.3 MG/DL (ref 8.5–10.1)
CHLORIDE SERPL-SCNC: 110 MMOL/L (ref 100–111)
CO2 SERPL-SCNC: 32 MMOL/L (ref 21–32)
COLOR UR: YELLOW
CREAT SERPL-MCNC: 0.81 MG/DL (ref 0.6–1.3)
DIFFERENTIAL METHOD BLD: NORMAL
EOSINOPHIL # BLD: 0.2 K/UL (ref 0–0.4)
EOSINOPHIL NFR BLD: 2 % (ref 0–5)
ERYTHROCYTE [DISTWIDTH] IN BLOOD BY AUTOMATED COUNT: 13.6 % (ref 11.6–14.5)
EST. AVERAGE GLUCOSE BLD GHB EST-MCNC: 146 MG/DL
GLUCOSE SERPL-MCNC: 95 MG/DL (ref 74–99)
GLUCOSE UR STRIP.AUTO-MCNC: NEGATIVE MG/DL
HBA1C MFR BLD: 6.7 % (ref 4.2–5.6)
HCT VFR BLD AUTO: 41.5 % (ref 35–45)
HGB BLD-MCNC: 13.5 G/DL (ref 12–16)
HGB UR QL STRIP: NEGATIVE
INR PPP: 0.9 (ref 0.8–1.2)
KETONES UR QL STRIP.AUTO: NEGATIVE MG/DL
LEUKOCYTE ESTERASE UR QL STRIP.AUTO: NEGATIVE
LYMPHOCYTES # BLD: 1.9 K/UL (ref 0.9–3.6)
LYMPHOCYTES NFR BLD: 27 % (ref 21–52)
MCH RBC QN AUTO: 28.2 PG (ref 24–34)
MCHC RBC AUTO-ENTMCNC: 32.5 G/DL (ref 31–37)
MCV RBC AUTO: 86.6 FL (ref 74–97)
MONOCYTES # BLD: 0.6 K/UL (ref 0.05–1.2)
MONOCYTES NFR BLD: 8 % (ref 3–10)
NEUTS SEG # BLD: 4.4 K/UL (ref 1.8–8)
NEUTS SEG NFR BLD: 63 % (ref 40–73)
NITRITE UR QL STRIP.AUTO: NEGATIVE
PH UR STRIP: 6.5 [PH] (ref 5–8)
PLATELET # BLD AUTO: 374 K/UL (ref 135–420)
PMV BLD AUTO: 9.6 FL (ref 9.2–11.8)
POTASSIUM SERPL-SCNC: 3.6 MMOL/L (ref 3.5–5.5)
PROT UR STRIP-MCNC: NEGATIVE MG/DL
PROTHROMBIN TIME: 12.3 SEC (ref 11.5–15.2)
RBC # BLD AUTO: 4.79 M/UL (ref 4.2–5.3)
SODIUM SERPL-SCNC: 144 MMOL/L (ref 136–145)
SP GR UR REFRACTOMETRY: 1.02 (ref 1–1.03)
SPECIMEN EXP DATE BLD: NORMAL
UROBILINOGEN UR QL STRIP.AUTO: 2 EU/DL (ref 0.2–1)
WBC # BLD AUTO: 7 K/UL (ref 4.6–13.2)

## 2019-10-09 PROCEDURE — 93005 ELECTROCARDIOGRAM TRACING: CPT

## 2019-10-09 PROCEDURE — 87086 URINE CULTURE/COLONY COUNT: CPT

## 2019-10-09 PROCEDURE — 83036 HEMOGLOBIN GLYCOSYLATED A1C: CPT

## 2019-10-09 PROCEDURE — 36415 COLL VENOUS BLD VENIPUNCTURE: CPT

## 2019-10-09 PROCEDURE — 71046 X-RAY EXAM CHEST 2 VIEWS: CPT

## 2019-10-09 PROCEDURE — 80048 BASIC METABOLIC PNL TOTAL CA: CPT

## 2019-10-09 PROCEDURE — 85610 PROTHROMBIN TIME: CPT

## 2019-10-09 PROCEDURE — 82040 ASSAY OF SERUM ALBUMIN: CPT

## 2019-10-09 PROCEDURE — 85730 THROMBOPLASTIN TIME PARTIAL: CPT

## 2019-10-09 PROCEDURE — 85025 COMPLETE CBC W/AUTO DIFF WBC: CPT

## 2019-10-09 PROCEDURE — 86900 BLOOD TYPING SEROLOGIC ABO: CPT

## 2019-10-09 PROCEDURE — 81003 URINALYSIS AUTO W/O SCOPE: CPT

## 2019-10-09 NOTE — PROGRESS NOTES
Ruchi Pierce has decided with their surgeon to have a joint replacement to improve mobility and decrease pain. Joint Replacement Pre-Operative class was attended 10/9/2019. Topics discussed included surgery preparation, what to expect the day of surgery, medications (to include a multimodal approach to pain control and limiting narcotics), nutrition, glycemic control, respiratory therapy, physical and occupational therapy, and discharge planning. Discussed the importance of using these alternative pain management methods with the goal of using less opioid use after surgery and at home. A patient education notebook was provided and the opportunity was given to ask questions. The phone number of the Orthopaedic  was provided for any future questions or concerns.

## 2019-10-09 NOTE — PERIOP NOTES
Ms. Aj Allen was here today for her PAT appointment. Health assessment was completed and instructions given regarding NPO status, medications, Hibiclens washes, and removal of all jewelry and/or body piercing. Instructed patient not to remove the red Blood Bank armband that was placed on their arm when the Type and Screen was drawn. Instructed to bring walker to the hospital on the day of surgery so it can be properly adjusted by the physical therapist.  Kendall Perea was given to ask questions and all questions were answered. Understanding of instructions was verbalized.

## 2019-10-10 LAB
ATRIAL RATE: 67 BPM
BACTERIA SPEC CULT: NORMAL
CALCULATED P AXIS, ECG09: 50 DEGREES
CALCULATED R AXIS, ECG10: 7 DEGREES
CALCULATED T AXIS, ECG11: 74 DEGREES
DIAGNOSIS, 93000: NORMAL
P-R INTERVAL, ECG05: 138 MS
Q-T INTERVAL, ECG07: 388 MS
QRS DURATION, ECG06: 90 MS
QTC CALCULATION (BEZET), ECG08: 409 MS
SERVICE CMNT-IMP: NORMAL
VENTRICULAR RATE, ECG03: 67 BPM

## 2019-10-11 ENCOUNTER — DOCUMENTATION ONLY (OUTPATIENT)
Dept: ORTHOPEDIC SURGERY | Age: 59
End: 2019-10-11

## 2019-10-11 NOTE — PROGRESS NOTES
Form from [address] is complete and patient was notified. A copy was left at [facility]  for patient to .

## 2019-10-14 ENCOUNTER — OFFICE VISIT (OUTPATIENT)
Dept: ORTHOPEDIC SURGERY | Facility: CLINIC | Age: 59
End: 2019-10-14

## 2019-10-14 DIAGNOSIS — Z01.818 ENCOUNTER FOR PREOPERATIVE EXAMINATION FOR GENERAL SURGICAL PROCEDURE: ICD-10-CM

## 2019-10-14 DIAGNOSIS — Z96.651 HISTORY OF TOTAL RIGHT KNEE REPLACEMENT (TKR): Primary | ICD-10-CM

## 2019-10-16 ENCOUNTER — OFFICE VISIT (OUTPATIENT)
Dept: ORTHOPEDIC SURGERY | Facility: CLINIC | Age: 59
End: 2019-10-16

## 2019-10-16 VITALS
BODY MASS INDEX: 30.15 KG/M2 | HEIGHT: 66 IN | RESPIRATION RATE: 18 BRPM | DIASTOLIC BLOOD PRESSURE: 67 MMHG | OXYGEN SATURATION: 100 % | SYSTOLIC BLOOD PRESSURE: 134 MMHG | WEIGHT: 187.6 LBS | HEART RATE: 58 BPM | TEMPERATURE: 96.3 F

## 2019-10-16 DIAGNOSIS — T84.012D FAILED TOTAL RIGHT KNEE REPLACEMENT, SUBSEQUENT ENCOUNTER: Primary | ICD-10-CM

## 2019-10-16 RX ORDER — ACETAMINOPHEN 325 MG/1
1000 TABLET ORAL ONCE
Status: CANCELLED | OUTPATIENT
Start: 2019-10-16 | End: 2019-10-16

## 2019-10-16 RX ORDER — WARFARIN 1 MG/1
10 TABLET ORAL ONCE
Status: CANCELLED | OUTPATIENT
Start: 2019-10-16 | End: 2019-10-16

## 2019-10-16 RX ORDER — CELECOXIB 100 MG/1
400 CAPSULE ORAL ONCE
Status: CANCELLED | OUTPATIENT
Start: 2019-10-16 | End: 2019-10-16

## 2019-10-16 RX ORDER — NITROFURANTOIN 25; 75 MG/1; MG/1
100 CAPSULE ORAL 2 TIMES DAILY
Qty: 10 CAP | Refills: 0 | Status: SHIPPED | OUTPATIENT
Start: 2019-10-16 | End: 2019-10-24

## 2019-10-16 RX ORDER — PREGABALIN 25 MG/1
75 CAPSULE ORAL ONCE
Status: CANCELLED | OUTPATIENT
Start: 2019-10-16 | End: 2019-10-16

## 2019-10-16 NOTE — PATIENT INSTRUCTIONS
Patient: Valerie Caldwell                MRN: [id number]       SSN: [id number] YOB: 1960        AGE: 61 y.o. SEX: female Body mass index is 30.28 kg/m². 10/16/19 DO: 
1:  Sit with the leg out straight 5-10 min every hour while awake. Keep the toes pointed       up towards the toby. 2.  Bend your knee 5-10 min every hour. Bend it to the point of pain and hold it for 5-10       Min. 3.  ICE your knee 20 min every hour DO NOT: 
 
1. Do not place anything under your knee to prop it up 2. Do not sit in the recliner chair.

## 2019-10-16 NOTE — H&P
[address] [place] [address]  [phone]           HISTORY & PHYSICAL      Patient: Greg Delcid                MRN: [id number]       SSN: [id number]  YOB: 1960        AGE: 61 y.o. SEX: female  Body mass index is 30.28 kg/m². PCP: Lakshmi Ivan MD  10/16/19      CC: failed right TKA  Problem List Items Addressed This Visit     None      Visit Diagnoses     Failed total right knee replacement, subsequent encounter    -  Primary            HPI:  The patient is a pleasant 61 y.o. whom had a TKA of their Right knee and has gone on to develop loosening of the metal components. She has start up pain. She had a negative infectious workup. Due to the current findings and affected activities of daily living, surgical intervention is indicated. The alternatives, risks, complications, as well as expected outcome were discussed. These include but are not limited to infection, blood loss, need for blood transfusion, neurovascular damage, DVT, PE,  post-op stiffness and pain, leg length discrepancy, dislocation, anesthetic complications, prothesis longevity, need for more surgery, MI, stroke, and even death. The patient understands and wishes to proceed with surgery.       Past Medical History:   Diagnosis Date    Arthritis     Elevated cholesterol     no meds     GERD (gastroesophageal reflux disease)     Hypertension          Current Outpatient Medications:     nitrofurantoin, macrocrystal-monohydrate, (MACROBID) 100 mg capsule, Take 1 Cap by mouth two (2) times a day., Disp: 10 Cap, Rfl: 0    VITAMIN D2 50,000 unit capsule, take 1 capsule by mouth every week, Disp: , Rfl: 0    hydroCHLOROthiazide (HYDRODIURIL) 25 mg tablet, take 1 tablet by mouth once daily, Disp: , Rfl: 0    Omeprazole delayed release (PRILOSEC D/R) 20 mg tablet, take 1 tablet by mouth once daily, Disp: , Rfl: 0    No Known Allergies    Social History     Socioeconomic History    Marital status: LEGALLY      Spouse name: Not on file    Number of children: Not on file    Years of education: Not on file    Highest education level: Not on file   Occupational History    Not on file   Social Needs    Financial resource strain: Not on file    Food insecurity:     Worry: Not on file     Inability: Not on file    Transportation needs:     Medical: Not on file     Non-medical: Not on file   Tobacco Use    Smoking status: Never Smoker    Smokeless tobacco: Never Used   Substance and Sexual Activity    Alcohol use: No    Drug use: No    Sexual activity: Not on file   Lifestyle    Physical activity:     Days per week: Not on file     Minutes per session: Not on file    Stress: Not on file   Relationships    Social connections:     Talks on phone: Not on file     Gets together: Not on file     Attends [employer] service: Not on file     Active member of club or organization: Not on file     Attends meetings of clubs or organizations: Not on file     Relationship status: Not on file    Intimate partner violence:     Fear of current or ex partner: Not on file     Emotionally abused: Not on file     Physically abused: Not on file     Forced sexual activity: Not on file   Other Topics Concern    Not on file   Social History Narrative    Not on file       Past Surgical History:   Procedure Laterality Date    HX  SECTION      x 4    HX KNEE ARTHROSCOPY      HX KNEE REPLACEMENT Right 2018    HX TUBAL LIGATION         Family History:  Non-contributory.      PE:  Visit Vitals  /67   Pulse (!) 58   Temp 96.3 °F (35.7 °C) (Oral)   Resp 18   Ht 5' 6\" (1.676 m)   Wt 187 lb 9.6 oz (85.1 kg)   SpO2 100%   BMI 30.28 kg/m²     A&O X3, NAD, well develop, well nourished  Heart: S1-S2, rrr  Lungs: CTA bilat  Abd: soft, nt, nt, + bs in all quadrants  Ext:  Pos distal pulses to DP, PT  Right knee- wound cdi  ROM: 0-115  Laxity noted in extension/mid-flexion    X-ray: right knee shows depuy Attune with evidence of tibial loosening    Labs: labs were reviewed and wnl.  ua neg, culture with >100,000, treated with macrobid    A:  Right  Knee, failed TKA    P:  At this point we will move forward with surgery. Again, the alternatives, risks, complications, as well as expected outcome were discussed and the patient wishes to proceed with surgery. Pt has been instructed to stop aspirin, nsaids, rheumatologic medications and blood thinners. They have also been instructed to continue on any heart and bp meds and to take them the morning of surgery with sips of water. The patient will require in-patient admission. Admission as an in-patient is reasonable and necessary due to increased risk of surgery due to the factors indicated as well as the possible need for prolonged in-hospital or skilled post-acute care in order to improve this patient's functional ability.         Lilibeth Campo

## 2019-10-16 NOTE — H&P (VIEW-ONLY)
[address] [place] [address], [address] [facility] [address] 
[phone] HISTORY & PHYSICAL Patient: Truman Varner                MRN: [id number]       SSN: [id number] YOB: 1960        AGE: 61 y.o. SEX: female Body mass index is 30.28 kg/m². PCP: Marisel Capellan MD 
10/16/19 CC: failed right TKA Problem List Items Addressed This Visit None Visit Diagnoses Failed total right knee replacement, subsequent encounter    -  Primary HPI:  The patient is a pleasant 61 y.o. whom had a TKA of their Right knee and has gone on to develop loosening of the metal components. She has start up pain. She had a negative infectious workup. Due to the current findings and affected activities of daily living, surgical intervention is indicated. The alternatives, risks, complications, as well as expected outcome were discussed. These include but are not limited to infection, blood loss, need for blood transfusion, neurovascular damage, DVT, PE,  post-op stiffness and pain, leg length discrepancy, dislocation, anesthetic complications, prothesis longevity, need for more surgery, MI, stroke, and even death. The patient understands and wishes to proceed with surgery. Past Medical History:  
Diagnosis Date  Arthritis  Elevated cholesterol   
 no meds  GERD (gastroesophageal reflux disease)  Hypertension Current Outpatient Medications:  
  nitrofurantoin, macrocrystal-monohydrate, (MACROBID) 100 mg capsule, Take 1 Cap by mouth two (2) times a day., Disp: 10 Cap, Rfl: 0 
  VITAMIN D2 50,000 unit capsule, take 1 capsule by mouth every week, Disp: , Rfl: 0 
  hydroCHLOROthiazide (HYDRODIURIL) 25 mg tablet, take 1 tablet by mouth once daily, Disp: , Rfl: 0 
  Omeprazole delayed release (PRILOSEC D/R) 20 mg tablet, take 1 tablet by mouth once daily, Disp: , Rfl: 0 No Known Allergies Social History Socioeconomic History  Marital status: LEGALLY  Spouse name: Not on file  Number of children: Not on file  Years of education: Not on file  Highest education level: Not on file Occupational History  Not on file Social Needs  Financial resource strain: Not on file  Food insecurity:  
  Worry: Not on file Inability: Not on file  Transportation needs:  
  Medical: Not on file Non-medical: Not on file Tobacco Use  Smoking status: Never Smoker  Smokeless tobacco: Never Used Substance and Sexual Activity  Alcohol use: No  
 Drug use: No  
 Sexual activity: Not on file Lifestyle  Physical activity:  
  Days per week: Not on file Minutes per session: Not on file  Stress: Not on file Relationships  Social connections:  
  Talks on phone: Not on file Gets together: Not on file Attends Spiritism service: Not on file Active member of club or organization: Not on file Attends meetings of clubs or organizations: Not on file Relationship status: Not on file  Intimate partner violence:  
  Fear of current or ex partner: Not on file Emotionally abused: Not on file Physically abused: Not on file Forced sexual activity: Not on file Other Topics Concern  Not on file Social History Narrative  Not on file Past Surgical History:  
Procedure Laterality Date  HX  SECTION    
 x 4  
 HX KNEE ARTHROSCOPY    
 HX KNEE REPLACEMENT Right 2018  HX TUBAL LIGATION Family History:  Non-contributory. PE: 
Visit Vitals /67 Pulse (!) 58 Temp 96.3 °F (35.7 °C) (Oral) Resp 18 Ht 5' 6\" (1.676 m) Wt 187 lb 9.6 oz (85.1 kg) SpO2 100% BMI 30.28 kg/m² A&O X3, NAD, well develop, well nourished Heart: S1-S2, rrr 
Lungs: CTA bilat Abd: soft, nt, nt, + bs in all quadrants Ext:  Pos distal pulses to DP, PT Right knee- wound cdi ROM: 0-115 Laxity noted in extension/mid-flexion X-ray: right knee shows depuy Attune with evidence of tibial loosening Labs: labs were reviewed and wnl.  ua neg, culture with >100,000, treated with macrobid A:  Right  Knee, failed TKA 
 
P:  At this point we will move forward with surgery. Again, the alternatives, risks, complications, as well as expected outcome were discussed and the patient wishes to proceed with surgery. Pt has been instructed to stop aspirin, nsaids, rheumatologic medications and blood thinners. They have also been instructed to continue on any heart and bp meds and to take them the morning of surgery with sips of water. The patient will require in-patient admission. Admission as an in-patient is reasonable and necessary due to increased risk of surgery due to the factors indicated as well as the possible need for prolonged in-hospital or skilled post-acute care in order to improve this patient's functional ability. Alie Woodruff

## 2019-10-22 ENCOUNTER — ANESTHESIA EVENT (OUTPATIENT)
Dept: SURGERY | Age: 59
DRG: 468 | End: 2019-10-22
Payer: COMMERCIAL

## 2019-10-23 ENCOUNTER — ANESTHESIA (OUTPATIENT)
Dept: SURGERY | Age: 59
DRG: 468 | End: 2019-10-23
Payer: COMMERCIAL

## 2019-10-23 ENCOUNTER — HOSPITAL ENCOUNTER (INPATIENT)
Age: 59
LOS: 1 days | Discharge: HOME HEALTH | DRG: 468 | End: 2019-10-24
Attending: ORTHOPAEDIC SURGERY | Admitting: ORTHOPAEDIC SURGERY
Payer: COMMERCIAL

## 2019-10-23 ENCOUNTER — APPOINTMENT (OUTPATIENT)
Dept: GENERAL RADIOLOGY | Age: 59
DRG: 468 | End: 2019-10-23
Attending: PHYSICIAN ASSISTANT
Payer: COMMERCIAL

## 2019-10-23 DIAGNOSIS — Z96.659 FAILURE OF TOTAL KNEE REPLACEMENT, SUBSEQUENT ENCOUNTER: Primary | ICD-10-CM

## 2019-10-23 DIAGNOSIS — T84.018D FAILURE OF TOTAL KNEE REPLACEMENT, SUBSEQUENT ENCOUNTER: Primary | ICD-10-CM

## 2019-10-23 PROBLEM — T84.018A FAILED TOTAL KNEE REPLACEMENT (HCC): Status: ACTIVE | Noted: 2019-10-23

## 2019-10-23 PROCEDURE — 77030018836 HC SOL IRR NACL ICUM -A: Performed by: ORTHOPAEDIC SURGERY

## 2019-10-23 PROCEDURE — 77030026438 HC STYL ET INTUB CARD -A: Performed by: ANESTHESIOLOGY

## 2019-10-23 PROCEDURE — 76210000016 HC OR PH I REC 1 TO 1.5 HR: Performed by: ORTHOPAEDIC SURGERY

## 2019-10-23 PROCEDURE — 77030008683 HC TU ET CUF COVD -A: Performed by: ANESTHESIOLOGY

## 2019-10-23 PROCEDURE — 77030006835 HC BLD SAW SAG STRY -B: Performed by: ORTHOPAEDIC SURGERY

## 2019-10-23 PROCEDURE — 77030034672 HC BUR RND UPWR 2MM CNMD -B: Performed by: ORTHOPAEDIC SURGERY

## 2019-10-23 PROCEDURE — 74011250636 HC RX REV CODE- 250/636: Performed by: NURSE ANESTHETIST, CERTIFIED REGISTERED

## 2019-10-23 PROCEDURE — 0SPC0JZ REMOVAL OF SYNTHETIC SUBSTITUTE FROM RIGHT KNEE JOINT, OPEN APPROACH: ICD-10-PCS | Performed by: ORTHOPAEDIC SURGERY

## 2019-10-23 PROCEDURE — 77030003029 HC SUT VCRL J&J -B: Performed by: ORTHOPAEDIC SURGERY

## 2019-10-23 PROCEDURE — 74011250636 HC RX REV CODE- 250/636: Performed by: ANESTHESIOLOGY

## 2019-10-23 PROCEDURE — 77030040361 HC SLV COMPR DVT MDII -B: Performed by: ORTHOPAEDIC SURGERY

## 2019-10-23 PROCEDURE — 74011000250 HC RX REV CODE- 250: Performed by: ORTHOPAEDIC SURGERY

## 2019-10-23 PROCEDURE — C9290 INJ, BUPIVACAINE LIPOSOME: HCPCS

## 2019-10-23 PROCEDURE — C1713 ANCHOR/SCREW BN/BN,TIS/BN: HCPCS | Performed by: ORTHOPAEDIC SURGERY

## 2019-10-23 PROCEDURE — 77030008467 HC STPLR SKN COVD -B: Performed by: ORTHOPAEDIC SURGERY

## 2019-10-23 PROCEDURE — 77030019557 HC ELECTRD VES SEAL MEDT -F: Performed by: ORTHOPAEDIC SURGERY

## 2019-10-23 PROCEDURE — 77030002933 HC SUT MCRYL J&J -A: Performed by: ORTHOPAEDIC SURGERY

## 2019-10-23 PROCEDURE — 64450 NJX AA&/STRD OTHER PN/BRANCH: CPT | Performed by: ANESTHESIOLOGY

## 2019-10-23 PROCEDURE — 77030012890: Performed by: ORTHOPAEDIC SURGERY

## 2019-10-23 PROCEDURE — 74011250636 HC RX REV CODE- 250/636: Performed by: PHYSICIAN ASSISTANT

## 2019-10-23 PROCEDURE — 77030040922 HC BLNKT HYPOTHRM STRY -A: Performed by: ORTHOPAEDIC SURGERY

## 2019-10-23 PROCEDURE — C1776 JOINT DEVICE (IMPLANTABLE): HCPCS | Performed by: ORTHOPAEDIC SURGERY

## 2019-10-23 PROCEDURE — 77030019605: Performed by: ORTHOPAEDIC SURGERY

## 2019-10-23 PROCEDURE — 77030031139 HC SUT VCRL2 J&J -A: Performed by: ORTHOPAEDIC SURGERY

## 2019-10-23 PROCEDURE — 77030006812 HC BLD SAW RECIP STRY -B: Performed by: ORTHOPAEDIC SURGERY

## 2019-10-23 PROCEDURE — 77030012411 HC DRN WND CARD -A: Performed by: ORTHOPAEDIC SURGERY

## 2019-10-23 PROCEDURE — 77030012012 HC AIRWY OP SFT TELE -A: Performed by: ANESTHESIOLOGY

## 2019-10-23 PROCEDURE — 65270000029 HC RM PRIVATE

## 2019-10-23 PROCEDURE — 74011000250 HC RX REV CODE- 250: Performed by: PHYSICIAN ASSISTANT

## 2019-10-23 PROCEDURE — 74011000250 HC RX REV CODE- 250

## 2019-10-23 PROCEDURE — 73560 X-RAY EXAM OF KNEE 1 OR 2: CPT

## 2019-10-23 PROCEDURE — 97161 PT EVAL LOW COMPLEX 20 MIN: CPT

## 2019-10-23 PROCEDURE — 76010000132 HC OR TIME 2.5 TO 3 HR: Performed by: ORTHOPAEDIC SURGERY

## 2019-10-23 PROCEDURE — 77030013708 HC HNDPC SUC IRR PULS STRY –B: Performed by: ORTHOPAEDIC SURGERY

## 2019-10-23 PROCEDURE — 77030030614 HC KT FEM BN PREP S&N -G1: Performed by: ORTHOPAEDIC SURGERY

## 2019-10-23 PROCEDURE — 74011250636 HC RX REV CODE- 250/636: Performed by: ORTHOPAEDIC SURGERY

## 2019-10-23 PROCEDURE — 77030034671 HC BUR RND UPWR CNMD -B: Performed by: ORTHOPAEDIC SURGERY

## 2019-10-23 PROCEDURE — 74011000258 HC RX REV CODE- 258

## 2019-10-23 PROCEDURE — 77030018719 HC DRSG PTCH ANTIMIC J&J -A: Performed by: ORTHOPAEDIC SURGERY

## 2019-10-23 PROCEDURE — 74011000250 HC RX REV CODE- 250: Performed by: NURSE ANESTHETIST, CERTIFIED REGISTERED

## 2019-10-23 PROCEDURE — 74011250637 HC RX REV CODE- 250/637: Performed by: PHYSICIAN ASSISTANT

## 2019-10-23 PROCEDURE — 74011250637 HC RX REV CODE- 250/637: Performed by: ORTHOPAEDIC SURGERY

## 2019-10-23 PROCEDURE — 74011250637 HC RX REV CODE- 250/637: Performed by: NURSE ANESTHETIST, CERTIFIED REGISTERED

## 2019-10-23 PROCEDURE — 74011000258 HC RX REV CODE- 258: Performed by: PHYSICIAN ASSISTANT

## 2019-10-23 PROCEDURE — 76942 ECHO GUIDE FOR BIOPSY: CPT | Performed by: ANESTHESIOLOGY

## 2019-10-23 PROCEDURE — 76060000036 HC ANESTHESIA 2.5 TO 3 HR: Performed by: ORTHOPAEDIC SURGERY

## 2019-10-23 PROCEDURE — 77030012935 HC DRSG AQUACEL BMS -B: Performed by: ORTHOPAEDIC SURGERY

## 2019-10-23 PROCEDURE — 74011250636 HC RX REV CODE- 250/636

## 2019-10-23 PROCEDURE — 77030003666 HC NDL SPINAL BD -A: Performed by: ORTHOPAEDIC SURGERY

## 2019-10-23 PROCEDURE — 77030027138 HC INCENT SPIROMETER -A: Performed by: ORTHOPAEDIC SURGERY

## 2019-10-23 PROCEDURE — 0SRC0J9 REPLACEMENT OF RIGHT KNEE JOINT WITH SYNTHETIC SUBSTITUTE, CEMENTED, OPEN APPROACH: ICD-10-PCS | Performed by: ORTHOPAEDIC SURGERY

## 2019-10-23 DEVICE — COMPONENT FEM SZ 4 R KNEE TOT STBL TRIATHLON: Type: IMPLANTABLE DEVICE | Site: KNEE | Status: FUNCTIONAL

## 2019-10-23 DEVICE — AUGMENT FEM SZ 4 THK5MM POST KNEE CO CHROM TOT STBL FULL: Type: IMPLANTABLE DEVICE | Site: KNEE | Status: FUNCTIONAL

## 2019-10-23 DEVICE — PREP IM ENCHANCED TOTAL HIP BONE                                    PREPARATION KIT
Type: IMPLANTABLE DEVICE | Status: FUNCTIONAL
Brand: PREP-IM

## 2019-10-23 DEVICE — BASEPLT TIB UNIV TRIATHLN 3 --: Type: IMPLANTABLE DEVICE | Site: KNEE | Status: FUNCTIONAL

## 2019-10-23 DEVICE — STEM TIB L50MM DIA15MM CO CHROM TOT STBL CEM TRIATHLON: Type: IMPLANTABLE DEVICE | Site: KNEE | Status: FUNCTIONAL

## 2019-10-23 DEVICE — IMPLANTABLE DEVICE: Type: IMPLANTABLE DEVICE | Site: KNEE | Status: FUNCTIONAL

## 2019-10-23 DEVICE — RESTRICTOR CEM OD30MM UNIV REV POLYMER IM INSRT DISPOSABLE: Type: IMPLANTABLE DEVICE | Site: KNEE | Status: FUNCTIONAL

## 2019-10-23 DEVICE — CEMENT BNE 20ML 41GM FULL DOSE PMMA W/ TOBRA M VISC RADPQ: Type: IMPLANTABLE DEVICE | Site: KNEE | Status: FUNCTIONAL

## 2019-10-23 RX ORDER — GLYCOPYRROLATE 0.2 MG/ML
INJECTION INTRAMUSCULAR; INTRAVENOUS AS NEEDED
Status: DISCONTINUED | OUTPATIENT
Start: 2019-10-23 | End: 2019-10-23 | Stop reason: HOSPADM

## 2019-10-23 RX ORDER — ROCURONIUM BROMIDE 10 MG/ML
INJECTION, SOLUTION INTRAVENOUS AS NEEDED
Status: DISCONTINUED | OUTPATIENT
Start: 2019-10-23 | End: 2019-10-23 | Stop reason: HOSPADM

## 2019-10-23 RX ORDER — POLYMYXIN B 500000 [USP'U]/1
INJECTION, POWDER, LYOPHILIZED, FOR SOLUTION INTRAMUSCULAR; INTRATHECAL; INTRAVENOUS; OPHTHALMIC AS NEEDED
Status: DISCONTINUED | OUTPATIENT
Start: 2019-10-23 | End: 2019-10-23 | Stop reason: HOSPADM

## 2019-10-23 RX ORDER — LABETALOL HCL 20 MG/4 ML
SYRINGE (ML) INTRAVENOUS AS NEEDED
Status: DISCONTINUED | OUTPATIENT
Start: 2019-10-23 | End: 2019-10-23 | Stop reason: HOSPADM

## 2019-10-23 RX ORDER — SODIUM CHLORIDE 0.9 % (FLUSH) 0.9 %
5-40 SYRINGE (ML) INJECTION AS NEEDED
Status: DISCONTINUED | OUTPATIENT
Start: 2019-10-23 | End: 2019-10-24 | Stop reason: HOSPADM

## 2019-10-23 RX ORDER — OXYCODONE HYDROCHLORIDE 5 MG/1
10-15 TABLET ORAL
Status: DISCONTINUED | OUTPATIENT
Start: 2019-10-23 | End: 2019-10-24 | Stop reason: HOSPADM

## 2019-10-23 RX ORDER — FENTANYL CITRATE 50 UG/ML
INJECTION, SOLUTION INTRAMUSCULAR; INTRAVENOUS AS NEEDED
Status: DISCONTINUED | OUTPATIENT
Start: 2019-10-23 | End: 2019-10-23 | Stop reason: HOSPADM

## 2019-10-23 RX ORDER — ASPIRIN 325 MG
325 TABLET, DELAYED RELEASE (ENTERIC COATED) ORAL 2 TIMES DAILY
Status: DISCONTINUED | OUTPATIENT
Start: 2019-10-24 | End: 2019-10-24 | Stop reason: HOSPADM

## 2019-10-23 RX ORDER — PREGABALIN 75 MG/1
75 CAPSULE ORAL ONCE
Status: COMPLETED | OUTPATIENT
Start: 2019-10-23 | End: 2019-10-23

## 2019-10-23 RX ORDER — ONDANSETRON 2 MG/ML
4 INJECTION INTRAMUSCULAR; INTRAVENOUS
Status: DISCONTINUED | OUTPATIENT
Start: 2019-10-23 | End: 2019-10-24 | Stop reason: HOSPADM

## 2019-10-23 RX ORDER — PREGABALIN 25 MG/1
25 CAPSULE ORAL 3 TIMES DAILY
Status: DISCONTINUED | OUTPATIENT
Start: 2019-10-23 | End: 2019-10-24 | Stop reason: HOSPADM

## 2019-10-23 RX ORDER — ONDANSETRON 2 MG/ML
4 INJECTION INTRAMUSCULAR; INTRAVENOUS ONCE
Status: COMPLETED | OUTPATIENT
Start: 2019-10-23 | End: 2019-10-23

## 2019-10-23 RX ORDER — FAMOTIDINE 20 MG/1
20 TABLET, FILM COATED ORAL ONCE
Status: COMPLETED | OUTPATIENT
Start: 2019-10-23 | End: 2019-10-23

## 2019-10-23 RX ORDER — FENTANYL CITRATE 50 UG/ML
100 INJECTION, SOLUTION INTRAMUSCULAR; INTRAVENOUS ONCE
Status: COMPLETED | OUTPATIENT
Start: 2019-10-23 | End: 2019-10-23

## 2019-10-23 RX ORDER — ZOLPIDEM TARTRATE 5 MG/1
5 TABLET ORAL
Status: DISCONTINUED | OUTPATIENT
Start: 2019-10-23 | End: 2019-10-24 | Stop reason: HOSPADM

## 2019-10-23 RX ORDER — MAGNESIUM SULFATE 100 %
4 CRYSTALS MISCELLANEOUS AS NEEDED
Status: DISCONTINUED | OUTPATIENT
Start: 2019-10-23 | End: 2019-10-23 | Stop reason: HOSPADM

## 2019-10-23 RX ORDER — ACETAMINOPHEN 500 MG
1000 TABLET ORAL EVERY 6 HOURS
Status: DISCONTINUED | OUTPATIENT
Start: 2019-10-23 | End: 2019-10-24 | Stop reason: HOSPADM

## 2019-10-23 RX ORDER — ROPIVACAINE HYDROCHLORIDE 2 MG/ML
INJECTION, SOLUTION EPIDURAL; INFILTRATION; PERINEURAL
Status: COMPLETED | OUTPATIENT
Start: 2019-10-23 | End: 2019-10-23

## 2019-10-23 RX ORDER — LIDOCAINE HYDROCHLORIDE 10 MG/ML
0.1 INJECTION, SOLUTION EPIDURAL; INFILTRATION; INTRACAUDAL; PERINEURAL AS NEEDED
Status: DISCONTINUED | OUTPATIENT
Start: 2019-10-23 | End: 2019-10-23 | Stop reason: HOSPADM

## 2019-10-23 RX ORDER — ONDANSETRON 2 MG/ML
INJECTION INTRAMUSCULAR; INTRAVENOUS AS NEEDED
Status: DISCONTINUED | OUTPATIENT
Start: 2019-10-23 | End: 2019-10-23 | Stop reason: HOSPADM

## 2019-10-23 RX ORDER — LANOLIN ALCOHOL/MO/W.PET/CERES
1 CREAM (GRAM) TOPICAL 2 TIMES DAILY WITH MEALS
Status: DISCONTINUED | OUTPATIENT
Start: 2019-10-23 | End: 2019-10-24 | Stop reason: HOSPADM

## 2019-10-23 RX ORDER — PANTOPRAZOLE SODIUM 40 MG/1
40 TABLET, DELAYED RELEASE ORAL
Status: DISCONTINUED | OUTPATIENT
Start: 2019-10-23 | End: 2019-10-24 | Stop reason: HOSPADM

## 2019-10-23 RX ORDER — SODIUM CHLORIDE 0.9 % (FLUSH) 0.9 %
5-40 SYRINGE (ML) INJECTION EVERY 8 HOURS
Status: DISCONTINUED | OUTPATIENT
Start: 2019-10-23 | End: 2019-10-23 | Stop reason: HOSPADM

## 2019-10-23 RX ORDER — CELECOXIB 400 MG/1
400 CAPSULE ORAL ONCE
Status: COMPLETED | OUTPATIENT
Start: 2019-10-23 | End: 2019-10-23

## 2019-10-23 RX ORDER — SODIUM CHLORIDE 0.9 % (FLUSH) 0.9 %
5-40 SYRINGE (ML) INJECTION AS NEEDED
Status: DISCONTINUED | OUTPATIENT
Start: 2019-10-23 | End: 2019-10-23 | Stop reason: HOSPADM

## 2019-10-23 RX ORDER — CEFAZOLIN SODIUM 2 G/50ML
2 SOLUTION INTRAVENOUS EVERY 8 HOURS
Status: COMPLETED | OUTPATIENT
Start: 2019-10-23 | End: 2019-10-24

## 2019-10-23 RX ORDER — HYDROMORPHONE HYDROCHLORIDE 2 MG/ML
0.5 INJECTION, SOLUTION INTRAMUSCULAR; INTRAVENOUS; SUBCUTANEOUS AS NEEDED
Status: DISCONTINUED | OUTPATIENT
Start: 2019-10-23 | End: 2019-10-23 | Stop reason: HOSPADM

## 2019-10-23 RX ORDER — SODIUM CHLORIDE, SODIUM LACTATE, POTASSIUM CHLORIDE, CALCIUM CHLORIDE 600; 310; 30; 20 MG/100ML; MG/100ML; MG/100ML; MG/100ML
75 INJECTION, SOLUTION INTRAVENOUS CONTINUOUS
Status: DISCONTINUED | OUTPATIENT
Start: 2019-10-23 | End: 2019-10-23 | Stop reason: HOSPADM

## 2019-10-23 RX ORDER — NEOSTIGMINE METHYLSULFATE 1 MG/ML
INJECTION, SOLUTION INTRAVENOUS AS NEEDED
Status: DISCONTINUED | OUTPATIENT
Start: 2019-10-23 | End: 2019-10-23 | Stop reason: HOSPADM

## 2019-10-23 RX ORDER — LIDOCAINE HYDROCHLORIDE 20 MG/ML
INJECTION, SOLUTION EPIDURAL; INFILTRATION; INTRACAUDAL; PERINEURAL AS NEEDED
Status: DISCONTINUED | OUTPATIENT
Start: 2019-10-23 | End: 2019-10-23 | Stop reason: HOSPADM

## 2019-10-23 RX ORDER — CEFAZOLIN SODIUM 2 G/50ML
2 SOLUTION INTRAVENOUS
Status: COMPLETED | OUTPATIENT
Start: 2019-10-23 | End: 2019-10-23

## 2019-10-23 RX ORDER — ROPIVACAINE HYDROCHLORIDE 2 MG/ML
60 INJECTION, SOLUTION EPIDURAL; INFILTRATION; PERINEURAL
Status: COMPLETED | OUTPATIENT
Start: 2019-10-23 | End: 2019-10-23

## 2019-10-23 RX ORDER — FLUMAZENIL 0.1 MG/ML
0.2 INJECTION INTRAVENOUS AS NEEDED
Status: DISCONTINUED | OUTPATIENT
Start: 2019-10-23 | End: 2019-10-24 | Stop reason: HOSPADM

## 2019-10-23 RX ORDER — SODIUM CHLORIDE 0.9 % (FLUSH) 0.9 %
5-40 SYRINGE (ML) INJECTION EVERY 8 HOURS
Status: DISCONTINUED | OUTPATIENT
Start: 2019-10-23 | End: 2019-10-24 | Stop reason: HOSPADM

## 2019-10-23 RX ORDER — NALOXONE HYDROCHLORIDE 0.4 MG/ML
0.4 INJECTION, SOLUTION INTRAMUSCULAR; INTRAVENOUS; SUBCUTANEOUS AS NEEDED
Status: DISCONTINUED | OUTPATIENT
Start: 2019-10-23 | End: 2019-10-24 | Stop reason: HOSPADM

## 2019-10-23 RX ORDER — SODIUM CHLORIDE 9 MG/ML
100 INJECTION, SOLUTION INTRAVENOUS CONTINUOUS
Status: DISCONTINUED | OUTPATIENT
Start: 2019-10-23 | End: 2019-10-24 | Stop reason: HOSPADM

## 2019-10-23 RX ORDER — PROPOFOL 10 MG/ML
INJECTION, EMULSION INTRAVENOUS AS NEEDED
Status: DISCONTINUED | OUTPATIENT
Start: 2019-10-23 | End: 2019-10-23 | Stop reason: HOSPADM

## 2019-10-23 RX ORDER — MIDAZOLAM HYDROCHLORIDE 1 MG/ML
INJECTION, SOLUTION INTRAMUSCULAR; INTRAVENOUS AS NEEDED
Status: DISCONTINUED | OUTPATIENT
Start: 2019-10-23 | End: 2019-10-23 | Stop reason: HOSPADM

## 2019-10-23 RX ORDER — DEXTROSE 50 % IN WATER (D50W) INTRAVENOUS SYRINGE
25-50 AS NEEDED
Status: DISCONTINUED | OUTPATIENT
Start: 2019-10-23 | End: 2019-10-23 | Stop reason: HOSPADM

## 2019-10-23 RX ORDER — AMOXICILLIN 250 MG
1 CAPSULE ORAL 2 TIMES DAILY
Status: DISCONTINUED | OUTPATIENT
Start: 2019-10-23 | End: 2019-10-24 | Stop reason: HOSPADM

## 2019-10-23 RX ORDER — WARFARIN 10 MG/1
10 TABLET ORAL ONCE
Status: COMPLETED | OUTPATIENT
Start: 2019-10-23 | End: 2019-10-23

## 2019-10-23 RX ORDER — CELECOXIB 100 MG/1
200 CAPSULE ORAL 2 TIMES DAILY
Status: DISCONTINUED | OUTPATIENT
Start: 2019-10-23 | End: 2019-10-24 | Stop reason: HOSPADM

## 2019-10-23 RX ORDER — HYDROCHLOROTHIAZIDE 25 MG/1
25 TABLET ORAL DAILY
Status: DISCONTINUED | OUTPATIENT
Start: 2019-10-24 | End: 2019-10-24 | Stop reason: HOSPADM

## 2019-10-23 RX ORDER — DEXAMETHASONE SODIUM PHOSPHATE 4 MG/ML
INJECTION, SOLUTION INTRA-ARTICULAR; INTRALESIONAL; INTRAMUSCULAR; INTRAVENOUS; SOFT TISSUE
Status: COMPLETED | OUTPATIENT
Start: 2019-10-23 | End: 2019-10-23

## 2019-10-23 RX ORDER — ACETAMINOPHEN 500 MG
1000 TABLET ORAL ONCE
Status: COMPLETED | OUTPATIENT
Start: 2019-10-23 | End: 2019-10-23

## 2019-10-23 RX ORDER — POVIDONE-IODINE 10 %
SOLUTION, NON-ORAL TOPICAL AS NEEDED
Status: DISCONTINUED | OUTPATIENT
Start: 2019-10-23 | End: 2019-10-23 | Stop reason: HOSPADM

## 2019-10-23 RX ORDER — MIDAZOLAM HYDROCHLORIDE 1 MG/ML
2 INJECTION, SOLUTION INTRAMUSCULAR; INTRAVENOUS ONCE
Status: COMPLETED | OUTPATIENT
Start: 2019-10-23 | End: 2019-10-23

## 2019-10-23 RX ORDER — VANCOMYCIN HYDROCHLORIDE 1 G/20ML
INJECTION, POWDER, LYOPHILIZED, FOR SOLUTION INTRAVENOUS AS NEEDED
Status: DISCONTINUED | OUTPATIENT
Start: 2019-10-23 | End: 2019-10-23 | Stop reason: HOSPADM

## 2019-10-23 RX ORDER — NITROFURANTOIN MACROCRYSTALS 50 MG/1
50 CAPSULE ORAL 4 TIMES DAILY
Status: DISCONTINUED | OUTPATIENT
Start: 2019-10-23 | End: 2019-10-24 | Stop reason: HOSPADM

## 2019-10-23 RX ADMIN — ONDANSETRON 4 MG: 2 INJECTION INTRAMUSCULAR; INTRAVENOUS at 13:35

## 2019-10-23 RX ADMIN — FENTANYL CITRATE 50 MCG: 50 INJECTION INTRAMUSCULAR; INTRAVENOUS at 11:17

## 2019-10-23 RX ADMIN — TRANEXAMIC ACID 1 G: 100 INJECTION, SOLUTION INTRAVENOUS at 13:35

## 2019-10-23 RX ADMIN — WARFARIN SODIUM 10 MG: 10 TABLET ORAL at 10:17

## 2019-10-23 RX ADMIN — PROPOFOL 50 MG: 10 INJECTION, EMULSION INTRAVENOUS at 11:41

## 2019-10-23 RX ADMIN — ROCURONIUM BROMIDE 10 MG: 10 INJECTION, SOLUTION INTRAVENOUS at 12:26

## 2019-10-23 RX ADMIN — FENTANYL CITRATE 50 MCG: 50 INJECTION, SOLUTION INTRAMUSCULAR; INTRAVENOUS at 10:43

## 2019-10-23 RX ADMIN — SODIUM CHLORIDE 100 ML/HR: 900 INJECTION, SOLUTION INTRAVENOUS at 16:52

## 2019-10-23 RX ADMIN — ROCURONIUM BROMIDE 50 MG: 10 INJECTION, SOLUTION INTRAVENOUS at 11:17

## 2019-10-23 RX ADMIN — FERROUS SULFATE TAB 325 MG (65 MG ELEMENTAL FE) 325 MG: 325 (65 FE) TAB at 16:49

## 2019-10-23 RX ADMIN — GLYCOPYRROLATE 0.4 MG: 0.2 INJECTION INTRAMUSCULAR; INTRAVENOUS at 13:34

## 2019-10-23 RX ADMIN — PREGABALIN 25 MG: 25 CAPSULE ORAL at 22:00

## 2019-10-23 RX ADMIN — FENTANYL CITRATE 50 MCG: 50 INJECTION INTRAMUSCULAR; INTRAVENOUS at 13:37

## 2019-10-23 RX ADMIN — PANTOPRAZOLE SODIUM 40 MG: 40 TABLET, DELAYED RELEASE ORAL at 16:49

## 2019-10-23 RX ADMIN — Medication 10 ML: at 22:02

## 2019-10-23 RX ADMIN — ROPIVACAINE HYDROCHLORIDE 40 MG: 2 INJECTION, SOLUTION EPIDURAL; INFILTRATION at 11:36

## 2019-10-23 RX ADMIN — FENTANYL CITRATE 50 MCG: 50 INJECTION INTRAMUSCULAR; INTRAVENOUS at 11:52

## 2019-10-23 RX ADMIN — SODIUM CHLORIDE, SODIUM LACTATE, POTASSIUM CHLORIDE, AND CALCIUM CHLORIDE 75 ML/HR: 600; 310; 30; 20 INJECTION, SOLUTION INTRAVENOUS at 10:14

## 2019-10-23 RX ADMIN — DEXAMETHASONE SODIUM PHOSPHATE 4 MG: 4 INJECTION, SOLUTION INTRAMUSCULAR; INTRAVENOUS at 11:36

## 2019-10-23 RX ADMIN — Medication 10 ML: at 18:06

## 2019-10-23 RX ADMIN — LABETALOL 20 MG/4 ML (5 MG/ML) INTRAVENOUS SYRINGE 2.5 MG: at 11:52

## 2019-10-23 RX ADMIN — ONDANSETRON 4 MG: 2 INJECTION INTRAMUSCULAR; INTRAVENOUS at 16:48

## 2019-10-23 RX ADMIN — ONDANSETRON 4 MG: 2 INJECTION INTRAMUSCULAR; INTRAVENOUS at 15:16

## 2019-10-23 RX ADMIN — CEFAZOLIN 2 G: 10 INJECTION, POWDER, FOR SOLUTION INTRAVENOUS at 18:57

## 2019-10-23 RX ADMIN — NITROFURANTOIN MACROCRYSTALS 50 MG: 50 CAPSULE ORAL at 22:01

## 2019-10-23 RX ADMIN — ROPIVACAINE HYDROCHLORIDE 60 MG: 2 INJECTION, SOLUTION EPIDURAL; INFILTRATION at 10:52

## 2019-10-23 RX ADMIN — CEFAZOLIN 2 G: 10 INJECTION, POWDER, FOR SOLUTION INTRAVENOUS at 11:23

## 2019-10-23 RX ADMIN — ACETAMINOPHEN 1000 MG: 500 TABLET ORAL at 18:04

## 2019-10-23 RX ADMIN — OXYCODONE HYDROCHLORIDE 10 MG: 5 TABLET ORAL at 16:49

## 2019-10-23 RX ADMIN — SENNOSIDES, DOCUSATE SODIUM 1 TABLET: 50; 8.6 TABLET, FILM COATED ORAL at 18:04

## 2019-10-23 RX ADMIN — MIDAZOLAM HYDROCHLORIDE 2 MG: 2 INJECTION, SOLUTION INTRAMUSCULAR; INTRAVENOUS at 11:08

## 2019-10-23 RX ADMIN — PROPOFOL 150 MG: 10 INJECTION, EMULSION INTRAVENOUS at 11:17

## 2019-10-23 RX ADMIN — LIDOCAINE HYDROCHLORIDE 80 MG: 20 INJECTION, SOLUTION EPIDURAL; INFILTRATION; INTRACAUDAL; PERINEURAL at 11:17

## 2019-10-23 RX ADMIN — NITROFURANTOIN MACROCRYSTALS 50 MG: 50 CAPSULE ORAL at 18:04

## 2019-10-23 RX ADMIN — CELECOXIB 400 MG: 400 CAPSULE ORAL at 10:17

## 2019-10-23 RX ADMIN — CELECOXIB 200 MG: 100 CAPSULE ORAL at 18:04

## 2019-10-23 RX ADMIN — TRANEXAMIC ACID 1 G: 100 INJECTION, SOLUTION INTRAVENOUS at 11:23

## 2019-10-23 RX ADMIN — Medication 3 MG: at 13:34

## 2019-10-23 RX ADMIN — PREGABALIN 25 MG: 25 CAPSULE ORAL at 16:49

## 2019-10-23 RX ADMIN — PREGABALIN 75 MG: 75 CAPSULE ORAL at 10:17

## 2019-10-23 RX ADMIN — ACETAMINOPHEN 1000 MG: 500 TABLET ORAL at 10:16

## 2019-10-23 RX ADMIN — FAMOTIDINE 20 MG: 20 TABLET, FILM COATED ORAL at 10:17

## 2019-10-23 RX ADMIN — MIDAZOLAM 1 MG: 1 INJECTION INTRAMUSCULAR; INTRAVENOUS at 10:43

## 2019-10-23 RX ADMIN — FENTANYL CITRATE 50 MCG: 50 INJECTION INTRAMUSCULAR; INTRAVENOUS at 11:46

## 2019-10-23 RX ADMIN — LABETALOL 20 MG/4 ML (5 MG/ML) INTRAVENOUS SYRINGE 5 MG: at 11:43

## 2019-10-23 NOTE — BRIEF OP NOTE
BRIEF OPERATIVE NOTE    Date of Procedure: 10/23/2019   Preoperative Diagnosis: T84.012A FAILED RIGHT KNEE  Postoperative Diagnosis: T84.012A FAILED RIGHT KNEE    Procedure(s):  RIGHT TOTAL KNEE REVISION with lateral release  Surgeon(s) and Role:     * Suzi Gay MD - Primary         Surgical Assistant: Eramso Harmon    Surgical Staff:  Circ-1: David Vang  Physician Assistant: Jamar Howell PA-C  Scrub Tech-1: Paula Aviles  Surg Asst-1: Foster Clemons  Event Time In Time Out   Incision Start 1140    Incision Close       Anesthesia: General   Estimated Blood Loss: 55ml  Specimens: * No specimens in log *   Findings: same   Complications: none  Implants:   Implant Name Type Inv.  Item Serial No.  Lot No. LRB No. Used Action   CEMENT BNE SIMPLEX TOBRA 4 --  - [id number]  CEMENT BNE SIMPLEX TOBRA 4 --   [employer] HOW [id number] Right 3 Implanted   CEMENT BNE SIMPLEX TOBRA 4 --  - [id number]  CEMENT BNE SIMPLEX TOBRA 4 --   [employer] HOW [id number] Right 3 Implanted   STEM TRIATHLON MICHELLE [id number] --  - [id number]  STEM TRIATHLON MICHELLE [id number] --   [employer] HOW [id number] Right 1 Implanted   BASEPLT TIB UNIV TRIATHLN 3 --  - [id number]  BASEPLT TIB UNIV TRIATHLN 3 --   [employer] HOW [id number] Right 1 Implanted   STEM TRIATHLON MICHELLE [id number] --  - [id number]  STEM TRIATHLON MICHELLE [id number] --   [employer] HOW [id number] Right 1 Implanted   AUG FEM POST SZ 4 5MM COCR -- TRITHLON - [id number]  AUG FEM POST SZ 4 5MM COCR -- TRITHLON  [employer] HOW [id number] Right 1 Implanted   AUG FEM POST SZ 4 5MM COCR -- TRITHLON - [id number]  AUG FEM POST SZ 4 5MM COCR -- TRITHLON  [employer] HOW [id number] Right 1 Implanted   AUG FEM TRITHLON 4 10MM R --  - [id number]  AUG FEM TRITHLON 4 10MM R --   [employer] HOW [id number] Right 1 Implanted   AUG FEM TRITHLON 4 10MM R --  - [id number]  AUG FEM TRITHLON 4 10MM R --   [employer] HOWMARSHALL [id number] Right 1 Implanted   COMPNT FEM TS MICHELLE TRITHLON 4 R --  - [id number]  COMPNT FEM TS MICHELLE TRITHLON 4 R --   [employer] HOWM [id number] Right 1 Implanted   RSTRCTR MICHELLE UNIV 30MM --  - [id number]  RSTRCTR MICHELLE UNIV 30MM --   [employer] HOWM [id number] Right 2 Implanted   INSERT TIB SZ3 TS+ X3 19MM -- TRIATHLON - [id number]  INSERT TIB SZ3 TS+ X3 19MM -- TRIATHLON  [employer] HOW [id number] Right 1 Implanted

## 2019-10-23 NOTE — ROUTINE PROCESS
Assumed patient care. Bedside shift report received from Antelmo. Patient in bed, awake, alert and oriented x3. Denies pain or discomforts at this time. Call light placed within reach. Bed in low position. 12:18 AM - Assisted patient to bathroom, ambulated to bathroom with 1 person assist and back to bed. Patient declined to ambulate in hallway at this time due to pt still feeling dizzy. Agreed to attempt to try in the morning. Placed all personal items in reach and call light. Due medications given, all needs met at this time. 6:48 AM - Removed drain, tolerated well. Dry dressing and ice pack placed. 7:31 AM - Bedside shift change report given to ZEYAD (oncoming nurse) by Shashank Zhu RN (offgoing nurse). Report given with SBAR, Kardex, Intake/Output, MAR and Recent Results.

## 2019-10-23 NOTE — PROGRESS NOTES
Problem: Mobility Impaired (Adult and Pediatric)  Goal: *Acute Goals and Plan of Care (Insert Text)  Description  Physical Therapy Goals   Initiated 10/23/2019 and to be accomplished within 7 days. 1.  Patient will complete all bed mobility with modified independence in order to prepare for EOB/OOB activity. 2.  Patient will perform sit <> stand with modified independence in order to prepare for OOB/gait activity. 3.  Patient will perform bed to chair transfers with modified independence in order to promote mobility and encourage seated activity to progress towards their prior level of function. 4.  Patient will ambulate 250 feet with modified independence using LRAD in order to prepare for safe negotiation of their environment. 5.  Patient will ascend/descend 1 steps with S handrail use with modified independence in order to prepare for safe exit/entry into their home environment. PLOF: Patient lives with family in a single story apartment with 1 JAN. Independent PTA. Outcome: Progressing Towards Goal   PHYSICAL THERAPY EVALUATION    Patient: Ruchi Pierce (85 y.o. female)  Date: 10/23/2019  Primary Diagnosis: [facility] [T84.018A, Z96.659]  Procedure(s) (LRB):  RIGHT TOTAL KNEE REVISION (Right) Day of Surgery   Precautions:  WBAT      ASSESSMENT :  Patient supine in bed, agreeable to participation with PT. Drain/ace wrap bandaging on R LE present. Reporting R knee pain 10/10 but agreeable to OOB. Supervision/SBA for supine > sit with increased time to complete; mobility impaired by nausea. Patient demos good seated balance; increased time at EOB d/t nausea. Supervision for sit > stand with RW; patient able to take 1 side step towards [facility] but declining further ambulation d/t nausea. Patient returned to bed; CGA for stand > sit and MIN A for sit > supine with assist for LEs. Patient positioned for comfort and left seated in bed with all needs.  FLO Cheney notified of patient's c/o nausea. Patient presenting with deficits in: Bed Mobility, Transfers, Gait and Stairs    Patient educated on role of PT, PT POC, bed mobility, transfers, gait, and safety with mobility as indicated. Recommend d/c home with home health    Patient will benefit from skilled intervention to address the above impairments. Patient's rehabilitation potential is considered to be Good  Factors which may influence rehabilitation potential include:   ? None noted  ? Mental ability/status  ? Medical condition  ? Home/family situation and support systems  ? Safety awareness  ? Pain tolerance/management  ? Other:      PLAN :  Recommendations and Planned Interventions:   ?           Bed Mobility Training             ? Neuromuscular Re-Education  ? Transfer Training                   ? Orthotic/Prosthetic Training  ? Gait Training                          ? Modalities  ? Therapeutic Exercises           ? Edema Management/Control  ? Therapeutic Activities            ? Family Training/Education  ? Patient Education  ? Other (comment):    Frequency/Duration: Patient will be followed by physical therapy BID 4-7x/week to address goals. Discharge Recommendations: Home Health  Further Equipment Recommendations for Discharge: RW     SUBJECTIVE:   Patient stated I can't, I'm getting too nauseas.     OBJECTIVE DATA SUMMARY:     Past Medical History:   Diagnosis Date    Arthritis     Elevated cholesterol     no meds     GERD (gastroesophageal reflux disease)     Hypertension      Past Surgical History:   Procedure Laterality Date    HX  SECTION      x 4    HX KNEE ARTHROSCOPY Right     HX KNEE REPLACEMENT Right 2018    HX TUBAL LIGATION       Barriers to Learning/Limitations: None  Compensate with: N/A  Home Situation:  Home Situation  Home Environment: Apartment  # Steps to Enter: 1  One/Two Story Residence: One story  Living Alone: No  Support Systems: Friends \ neighbors, Family member(s)  Patient Expects to be Discharged to[de-identified] Apartment  Current DME Used/Available at Home: Kingsley Hassan, sharlene, Walker, rolling  Critical Behavior:  Neurologic State: Alert  Orientation Level: Oriented X4  Cognition: Follows commands     Psychosocial  Patient Behaviors: Calm; Cooperative  Purposeful Interaction: Yes        Strength:    Strength: Generally decreased, functional    Tone & Sensation:   Tone: Normal    Range Of Motion:  AROM: Generally decreased, functional     Functional Mobility:  Bed Mobility:     Supine to Sit: Supervision;Stand-by assistance  Sit to Supine: Minimum assistance     Transfers:  Sit to Stand: Supervision;Stand-by assistance  Stand to Sit: Contact guard assistance        Balance:   Sitting: Intact  Standing: Intact; With support    Pain:  R knee pain  Pain level pre-treatment: 10/10   Pain level post-treatment: 10/10   Pain Intervention(s) : Rest, ice    Activity Tolerance:   Fair/limited by nausea    Please refer to the flowsheet for vital signs taken during this treatment. After treatment:   ?         Patient left in no apparent distress sitting up in chair  ? Patient left in no apparent distress in bed  ? Call bell left within reach  ? Nursing notified  ? Caregiver present  ? Bed alarm activated  ? SCDs applied    COMMUNICATION/EDUCATION:   ?         Role of Physical Therapy in the acute care setting. ?         Fall prevention education was provided and the patient/caregiver indicated understanding. ? Patient/family have participated as able in goal setting and plan of care. ?         Patient/family agree to work toward stated goals and plan of care. ?         Patient understands intent and goals of therapy, but is neutral about his/her participation. ?          Patient is unable to participate in goal setting/plan of care: ongoing with therapy staff. ?         Other:     Thank you for this referral.  Parris Downing   Time Calculation: 22 mins      Eval Complexity: History: MEDIUM  Complexity : 1-2 comorbidities / personal factors will impact the outcome/ POC Exam:MEDIUM Complexity : 3 Standardized tests and measures addressing body structure, function, activity limitation and / or participation in recreation  Presentation: LOW Complexity : Stable, uncomplicated  Clinical Decision Making:Low Complexity Supervision - MIN  Afor mobility  Overall Complexity:LOW

## 2019-10-23 NOTE — ANESTHESIA PROCEDURE NOTES
Peripheral Block    Start time: 10/23/2019 11:00 AM  End time: 10/23/2019 11:06 AM  Performed by: Leticia Mustafa MD  Authorized by: Jenni Powell MD       Pre-procedure: Indications: at surgeon's request, post-op pain management and procedure for pain    Preanesthetic Checklist: patient identified, risks and benefits discussed, site marked, timeout performed, anesthesia consent given and patient being monitored      Block Type:   Block Type: Adductor canal  Laterality:  Right  Monitoring:  Standard ASA monitoring, continuous pulse ox, frequent vital sign checks, oxygen, heart rate and responsive to questions  Injection Technique:  Single shot  Procedures: ultrasound guided    Patient Position: supine  Prep: chlorhexidine    Location:  Upper thigh  Needle Type:  Stimuplex  Needle Gauge:  21 G  Needle Localization:  Ultrasound guidance    Assessment:  Number of attempts:  1  Injection Assessment:  No paresthesia, incremental injection every 5 mL, ultrasound image on chart, no intravascular symptoms, negative aspiration for blood and local visualized surrounding nerve on ultrasound  Patient tolerance:  Patient tolerated the procedure well with no immediate complications  Location:  PREOP HOLDING    Patient given 1 mg IV Versed and 50 mcg IV Fentanyl for sedation.     10/23/2019     11:36 AM     Glenda Ortega MD

## 2019-10-23 NOTE — PROGRESS NOTES
conducted an initial consultation and Spiritual Assessment for Michael Miller, who is a 61 y.o.,female. Patients Primary Language is: [place]. According to the patients EMR Mu-ism Affiliation is: Mosque. The reason the Patient came to the hospital is:   Patient Active Problem List    Diagnosis Date Noted    Failed total knee replacement ([facility] 10/23/2019    Arthritis of knee, right 06/06/2018        The  provided the following Interventions:  Patient was still groggy and sleepy when I visited and initiated a relationship of care and support. Unable to explore issues of leanne, belief, spirituality and Cheondoism/ritual needs while hospitalized because patient cannot hold conversation due to being tired and groggy. Provided information about Spiritual Care Services. Offered assurance of continued prayers on patient's behalf. Chart reviewed. The following outcomes where achieved:  Patient was not able to share information about both her medical narrative and spiritual journey/beliefs because she was too tired and still sleepy from the procedure.  confirmed Patient's Mu-ism Affiliation with [facility]. Patient will appreciate a visit from the [address], Fr. Aleksandra Mahoney. She is also receptive to receiving communion from the ministers of holy communion if they could bring her communion. Patient expressed gratitude for 's visit. Assessment:  Patient does not have any Cheondoism/cultural needs that will affect patients preferences in health care. There are no spiritual or Cheondoism issues which require intervention at this time. Plan:  Chaplains will continue to follow and will provide pastoral care on an as needed/requested basis.  recommends bedside caregivers page  on duty if patient shows signs of acute spiritual or emotional distress.     [facility], [place], operated by [facility]   [phone]

## 2019-10-23 NOTE — CONSULTS
Hospitalist Consult Note    Patient: Sorin Dueñas MRN: [id number]  CSN: [id number]    YOB: 1960  Age: 61 y.o. Sex: female    DOA: 10/23/2019 LOS:  LOS: 0 days        Requesting Physician:  Dr Jordan Yoder for Consultation:  Medical Management    Chief Complaint:  Medical Mx     Assessment/Plan     Patient Active Problem List   Diagnosis Code    Arthritis of knee, right M17.11    Failed total knee replacement ([facility] [address] T84.018A, Z96.659         A/P:  1. R TKR revision 2y to loose joint - Mx per ortho   2. H/o HTN - resume home meds, monitor BP   3. Pain control - per Ortho   DVT px - Per Ortho   FC     HPI:     Sorin Dueñas is a 61 y.o. female with a hx of HTN who underwent a R knee revision - she mentions she had R TKR last yr but it appears that the joint became loose & she underwetn a repeat procedure today   She is seen post op - is c/o pain post surg   Hospitalist consulted for medical management     Past Medical History:   Diagnosis Date    Arthritis     Elevated cholesterol     no meds     GERD (gastroesophageal reflux disease)     Hypertension        Past Surgical History:   Procedure Laterality Date    HX  SECTION      x 4    HX KNEE ARTHROSCOPY Right     HX KNEE REPLACEMENT Right 2018    HX TUBAL LIGATION         History reviewed. No pertinent family history. Social History     Socioeconomic History    Marital status: LEGALLY      Spouse name: Not on file    Number of children: Not on file    Years of education: Not on file    Highest education level: Not on file   Tobacco Use    Smoking status: Never Smoker    Smokeless tobacco: Never Used   Substance and Sexual Activity    Alcohol use: No    Drug use: No       Prior to Admission medications    Medication Sig Start Date End Date Taking?  Authorizing Provider   VITAMIN D2 50,000 unit capsule take 1 capsule by mouth every week 3/5/18  Yes Provider, Historical   hydroCHLOROthiazide (HYDRODIURIL) 25 mg tablet take 1 tablet by mouth once daily 3/5/18  Yes Provider, Historical   nitrofurantoin, macrocrystal-monohydrate, (MACROBID) 100 mg capsule Take 1 Cap by mouth two (2) times a day. 10/16/19   Nida Mccarty PA-C   Omeprazole delayed release (PRILOSEC D/R) 20 mg tablet take 1 tablet by mouth once daily 3/5/18   Provider, Historical       No Known Allergies    Review of Systems  A comprehensive review of systems was negative except for that written in the History of Present Illness. Physical Exam:      Visit Vitals  /67   Pulse 78   Temp 98.2 °F (36.8 °C)   Resp 16   Ht 5' 6\" (1.676 m)   Wt 88.3 kg (194 lb 9.6 oz)   SpO2 98%   BMI 31.41 kg/m²       Physical Exam:  Gen: In general, this is a well nourished female in no acute distress   HEENT: Sclerae nonicteric. Oral mucous membranes moist. Denition poor     Neck: Supple with midline trachea. CV: RRR without murmur or rub appreciated. Resp:Respirations are unlabored without use of accessory muscles. Lung fields B/L without wheezes or rhonchi. Abd: Soft, nontender, nondistended. Extrem: Extremities are warm, without cyanosis or clubbing. No pitting pretibial edema. Palpable distal pulses X 4. S/p R TKR revision   Skin: Warm, no visible rashes. Neuro: Patient is alert, oriented, and cooperative. No obvious focal defects. Moves all 4 extremities. Labs Reviewed:    No results found for this or any previous visit (from the past 24 hour(s)).     Imaging Reviewed:  None     Liberty Collet, MD  10/23/2019  3:43 PM

## 2019-10-23 NOTE — PERIOP NOTES
TRANSFER - OUT REPORT:    Verbal report given to [facility], THE RN on Kash Varghese  being transferred to  for routine post - op       Report consisted of patients Situation, Background, Assessment and   Recommendations(SBAR). Information from the following report(s) SBAR and MAR was reviewed with the receiving nurse. Lines:   Peripheral IV 10/23/19 Left Hand (Active)   Site Assessment Clean, dry, & intact 10/23/2019  1:42 PM   Phlebitis Assessment 0 10/23/2019  1:42 PM   Infiltration Assessment 0 10/23/2019  1:42 PM   Dressing Status Clean, dry, & intact 10/23/2019  1:42 PM   Dressing Type Tape;Transparent 10/23/2019  1:42 PM   Hub Color/Line Status Pink; Infusing 10/23/2019  1:42 PM        Opportunity for questions and clarification was provided.       Patient transported with:   [facility]

## 2019-10-23 NOTE — ANESTHESIA POSTPROCEDURE EVALUATION
Procedure(s):  RIGHT TOTAL KNEE REVISION. general, regional    Anesthesia Post Evaluation      Multimodal analgesia: multimodal analgesia used between 6 hours prior to anesthesia start to PACU discharge  Patient location during evaluation: bedside  Patient participation: complete - patient participated  Level of consciousness: awake  Pain score: 0  Pain management: adequate  Airway patency: patent  Anesthetic complications: no  Cardiovascular status: stable  Respiratory status: acceptable  Hydration status: acceptable  Post anesthesia nausea and vomiting:  controlled      Vitals Value Taken Time   /67 10/23/2019  3:02 PM   Temp 37.3 °C (99.2 °F) 10/23/2019  2:04 PM   Pulse 78 10/23/2019  3:09 PM   Resp 16 10/23/2019  3:09 PM   SpO2 98 % 10/23/2019  3:09 PM   Vitals shown include unvalidated device data.

## 2019-10-23 NOTE — INTERVAL H&P NOTE
H&P Update: 
Delia Joe was seen and examined. History and physical has been reviewed. The patient has been examined.  There have been no significant clinical changes since the completion of the originally dated History and Physical.

## 2019-10-23 NOTE — ROUTINE PROCESS
End of Shift Note  
  Bedside and verbal shift change report given to Selena (On coming nurse) by Klaudia Sanches (Off going nurse). Report included the following information:Procedure Summary, Intake/Output, MAR, Recent Results, Med Rec Status, and SBAR  (Situation, Background, Assessment and Recommendations) 
  
SBAR Recommendations: Pain management,  Mobility, drain to be removed at 6 AM tomorrow   
  
Issues for Provider to address 
  
  
  
Activity This Shift 
  
   [] Resting in bed 
 [] TDWB [] Dangled [x] Chair Ambulated to 
   [] Bathroom [x] BSC [] Refused Ambulated In 
  [x] Room 
  [] Hallway 
  [] Bedrest ordered Bladder Scan Voiding Status [] Yes 
[x] Void [] No 
[] Gustafson [] N/A 
[] I&O Cath Blood Sugars Managed [x] Yes [] No [] N/A Bowel Movement Passing Gas [] Yes 
[x] Yes [x] No 
[] No    
CHG Bath Done Before Surgery After Surgery   
[] Yes 
[] Yes   
[] No 
[] No   
[] N/A 
[] N/A Drain Removed [] Yes [x] No [] N/A Dressing Checked Dressing Changed [x] Yes 
[] Yes [] No 
[x] No [] N/A 
[] N/A Nausea/Vomiting [] Yes [x] No    
Ice Packs Changed [] Yes [] No [] N/A Incentive Spirometer  [x] Yes [] No Volume SCD Pumps On Bilaterally Ankle Pumping  [x] Yes 
[] Yes [] Yes 
[] No [] N/A 
[] N/A Telemetry Monitoring [] Yes [x] No Rhythm Up to Chair for Meals [x] Yes [] No [] N/A

## 2019-10-24 ENCOUNTER — HOME HEALTH ADMISSION (OUTPATIENT)
Dept: HOME HEALTH SERVICES | Facility: HOME HEALTH | Age: 59
End: 2019-10-24
Payer: COMMERCIAL

## 2019-10-24 VITALS
SYSTOLIC BLOOD PRESSURE: 110 MMHG | RESPIRATION RATE: 18 BRPM | WEIGHT: 194.6 LBS | TEMPERATURE: 97.3 F | DIASTOLIC BLOOD PRESSURE: 67 MMHG | OXYGEN SATURATION: 98 % | BODY MASS INDEX: 31.27 KG/M2 | HEART RATE: 67 BPM | HEIGHT: 66 IN

## 2019-10-24 PROCEDURE — 97165 OT EVAL LOW COMPLEX 30 MIN: CPT

## 2019-10-24 PROCEDURE — 97110 THERAPEUTIC EXERCISES: CPT

## 2019-10-24 PROCEDURE — 97535 SELF CARE MNGMENT TRAINING: CPT

## 2019-10-24 PROCEDURE — 77030037875 HC DRSG MEPILEX <16IN BORD MOLN -A

## 2019-10-24 PROCEDURE — 74011250636 HC RX REV CODE- 250/636: Performed by: ORTHOPAEDIC SURGERY

## 2019-10-24 PROCEDURE — 97116 GAIT TRAINING THERAPY: CPT

## 2019-10-24 PROCEDURE — 74011250637 HC RX REV CODE- 250/637: Performed by: ORTHOPAEDIC SURGERY

## 2019-10-24 RX ORDER — CELECOXIB 200 MG/1
200 CAPSULE ORAL 2 TIMES DAILY
Qty: 60 CAP | Refills: 2 | Status: SHIPPED | OUTPATIENT
Start: 2019-10-24 | End: 2020-01-22

## 2019-10-24 RX ORDER — ASPIRIN 325 MG
325 TABLET, DELAYED RELEASE (ENTERIC COATED) ORAL 2 TIMES DAILY
Qty: 60 TAB | Refills: 1 | Status: SHIPPED | OUTPATIENT
Start: 2019-10-24

## 2019-10-24 RX ORDER — DOCUSATE SODIUM 100 MG/1
100 CAPSULE, LIQUID FILLED ORAL 2 TIMES DAILY
Qty: 60 CAP | Refills: 2 | Status: SHIPPED | OUTPATIENT
Start: 2019-10-24 | End: 2020-01-22

## 2019-10-24 RX ORDER — OXYCODONE AND ACETAMINOPHEN 7.5; 325 MG/1; MG/1
1-2 TABLET ORAL
Qty: 56 TAB | Refills: 0 | Status: SHIPPED | OUTPATIENT
Start: 2019-10-24 | End: 2019-10-31

## 2019-10-24 RX ORDER — LANOLIN ALCOHOL/MO/W.PET/CERES
325 CREAM (GRAM) TOPICAL 2 TIMES DAILY WITH MEALS
Qty: 60 TAB | Refills: 1 | Status: SHIPPED | OUTPATIENT
Start: 2019-10-24

## 2019-10-24 RX ADMIN — ACETAMINOPHEN 1000 MG: 500 TABLET ORAL at 11:40

## 2019-10-24 RX ADMIN — CEFAZOLIN 2 G: 10 INJECTION, POWDER, FOR SOLUTION INTRAVENOUS at 11:40

## 2019-10-24 RX ADMIN — CEFAZOLIN 2 G: 10 INJECTION, POWDER, FOR SOLUTION INTRAVENOUS at 02:29

## 2019-10-24 RX ADMIN — SENNOSIDES, DOCUSATE SODIUM 1 TABLET: 50; 8.6 TABLET, FILM COATED ORAL at 09:33

## 2019-10-24 RX ADMIN — SODIUM CHLORIDE 100 ML/HR: 900 INJECTION, SOLUTION INTRAVENOUS at 06:31

## 2019-10-24 RX ADMIN — ACETAMINOPHEN 1000 MG: 500 TABLET ORAL at 05:39

## 2019-10-24 RX ADMIN — HYDROCHLOROTHIAZIDE 25 MG: 25 TABLET ORAL at 09:34

## 2019-10-24 RX ADMIN — FERROUS SULFATE TAB 325 MG (65 MG ELEMENTAL FE) 325 MG: 325 (65 FE) TAB at 09:33

## 2019-10-24 RX ADMIN — Medication 10 ML: at 05:40

## 2019-10-24 RX ADMIN — ASPIRIN 325 MG: 325 TABLET, COATED ORAL at 09:33

## 2019-10-24 RX ADMIN — NITROFURANTOIN MACROCRYSTALS 50 MG: 50 CAPSULE ORAL at 09:33

## 2019-10-24 RX ADMIN — PANTOPRAZOLE SODIUM 40 MG: 40 TABLET, DELAYED RELEASE ORAL at 09:33

## 2019-10-24 RX ADMIN — OXYCODONE HYDROCHLORIDE 10 MG: 5 TABLET ORAL at 09:34

## 2019-10-24 RX ADMIN — PREGABALIN 25 MG: 25 CAPSULE ORAL at 09:33

## 2019-10-24 RX ADMIN — CELECOXIB 200 MG: 100 CAPSULE ORAL at 09:33

## 2019-10-24 RX ADMIN — ACETAMINOPHEN 1000 MG: 500 TABLET ORAL at 00:10

## 2019-10-24 NOTE — PROGRESS NOTES
Discharge order noted for today. Pt has been accepted to [facility] agency. Met with patient who is agreeable to the transition plan today. Transport has been arranged through sister. Patient's discharge summary and home health  orders have been forwarded to [facility] home health  agency via que. Updated bedside RN,  to the transition plan. Discharge information has been documented on the AVS.  Has walker, has script for commode, toilet seat, and shower chair, but pt does not feel she needs it currently. Reason for Admission:  Failed total knee replacement ([address] [T84.018A, Z96.659]                 RRAT Score:    3            Plan for utilizing home health:    Yes foc signed for [facility]. Likelihood of Readmission:   LOW                         Transition of Care Plan:              Initial assessment completed with patient. Cognitive status of patient: oriented to time, place, person and situation. Face sheet information confirmed:  yes. The patient designates granddaughter and sisters to participate in her discharge plan and to receive any needed information. This patient lives in a single family home with granddaughter. Patient is able to navigate steps as needed. Prior to hospitalization, patient was considered to be independent with ADLs/IADLS : yes . Patient has a current ACP document on file: no  The sister will be available to transport patient home upon discharge. The patient already has Nicholette Peeling,  medical equipment available in the home. Patient is not currently active with home health. Patient has not stayed in a skilled nursing facility or rehab. This patient is on dialysis :no    List of available Home Health agencies were provided and reviewed with the patient prior to discharge. Freedom of choice signed: yes, for [facility]. Currently, the discharge plan is Home with  [facility].     The patient states that she can obtain her medications from the pharmacy, and take her medications as directed. Patient's current insurance is [facility] Management Interventions  PCP Verified by CM:  Yes  Palliative Care Criteria Met (RRAT>21 & CHF Dx)?: No  Mode of Transport at Discharge: Self  Transition of Care Consult (CM Consult): [address]: Yes  MyChart Signup: No  Discharge Durable Medical Equipment: No  Physical Therapy Consult: Yes  Occupational Therapy Consult: Yes  Speech Therapy Consult: No  Current Support Network: Relative's Home  Confirm Follow Up Transport: Family  Plan discussed with Pt/Family/Caregiver: Yes  Freedom of Choice Offered: Yes  Discharge Location  Discharge Placement: Home with home health        JES Rivera  Case Management  [phone]

## 2019-10-24 NOTE — OP NOTES
[address] [place] [address]   OPERATIVE REPORT    Name:  Mirta Pruitt  MR#:   [id number]  :  1960  ACCOUNT #:  [de-identified]  DATE OF SERVICE:  10/23/2019    PREOPERATIVE DIAGNOSES:  Failed right total knee replacement with loose tibial baseplate and instability of the knee, including recurvatum and mid-flexion instability. POSTOPERATIVE DIAGNOSES:  Failed right total knee replacement with loose tibial baseplate and instability of the knee, including recurvatum and mid-flexion instability. PROCEDURES PERFORMED:  1. Full revision of the right knee using the [employer] system with a size 4 right TS femoral component, a size 3 tibia, and each femoral and tibial component had a 15 x 50 cemented stem. The femoral component also had 10 mm distal wedges and 5 mm posterior wedges and finally, a size 3, 19 TS insert. 2.  Lateral release. 3.  Reestablishing of canals. 4.  Removal of extensive amount of cement on the tibial side. 5.  Rebalancing of knee. SURGEON:  Tonny Simmons MD.    FIRST ASSISTANT:  Vivia Gosselin. SECOND ASSISTANT:  Shyann Mccain. ANESTHESIA:  Preoperative femoral nerve block with light general.    ANESTHETIST:  Cy Snyder MD.    COMPLICATIONS:  None. SPECIMENS REMOVED:  None. IMPLANTS:  As above mentioned. ESTIMATED BLOOD LOSS:  50 mL. INDICATIONS:  The patient had been well worked up for infection and had a clinically loose tibial baseplate. Bone scan confirmed this. All risks and benefits described including chronic pain, failure of the revision, recurrent instability, and other complications. All questions answered. The patient elected to proceed. It was confirmed that she did receive her antibiotics and had been previously worked up for infection, which was negative. PROCEDURE:  After standard prep and drape, we marked the previous incision and a time-out performed.   We used the previous incision, extended it both proximally and distally, maintained full thickness flaps, performed our arthrotomy, fair bit of synovitis, and she had a fair bit of thickening of the tibial ligament just superior to the patella. Therefore, a lateral release was indicated to help mobilize the patella. Overall, the knee appeared to be very clean inside with no evidence for infection. With the polyethylene in, we used an osteotome to gap balance the knee and she was recurvatum and had significant mid-flexion instability in the lateral.  The lateral side was quite lax. With this in mind, our plan will be to add further external rotation and hopefully to upsize the femur as well, tighten up the flexion gap. With this in mind, I used a saw to cut the post on the polyethylene rotating platform insert, extracted the poly while protecting neurovascular bundle at all times. We then used a combination of saw and osteotome to extract the femur. Femur was well cemented, although in need of further external rotation. I then got safe access to the posterior tibia with a Tekoa Hodgkin, placed the bent blunt Hohmann to sublux the tibia anteriorly and with 3 vibrations of the saw, the tibia popped up. The tibial component was removed completely clean with no cement whatsoever adherent to the back of the tibia. However, there was copious cement down the canal.  The canal was noted to be fairly posterior and we did remove the cement and then reoriented the proper canal more anteriorly on the tibia and then spent at least 15-20 minutes digging out the cement with the high-powered bur and rongeur. We protected the posterior cortex of the tibia and then got a straight shot down the canal.  We then reamed up to accommodate the tibia and then did a clean up cut, we kept it to a minimum, and then again she was recurvatum and loosened flexion as well. We then prepared for the boss as well and then we punched the tibia later after reducing the knee with the trial components.     We then switched our attention to the femur and I removed any extraneous cement. We removed the cement from the femoral canal, reestablished the correct orientation on the canal and then reamed up to accommodate the implant, and then, we used a ream-long cement-short technique and we would gap balance the knee and had the necessary external rotation to balance the knee in flexion. We went with the largest femoral component we could with matching the tibia, which was a few millimeters bigger than the one that was removed and then did our finishing cuts after we gap balanced the knee and correctly set femoral rotation. We then placed the trial components to set our tibial rotation, which was marked and punched and we then put the knee through a range of motion with full extension and very good flexion, very well balanced, and this is with the pins distally augmented  posteriorly. Punched the tibia as per our marks and did a double cement mixture setup, used 50 mm short stems and pressurized the canals and cemented in the knee, holding the knee in full extension. Cement was fully cured. Further cement removal, further pulse lavage, further trialing. I was happiest with the 19, locked it in place, let the tourniquet down well short of the 2-hour carolina, and then routine closure. At the end of the case, instrument, sponge, needle counts were correct. There were no complications. The patient tolerated the procedure well and blood loss less than 50 mL occurred after an excellent outcome of the case and no complications.       Anisha Salazar MD [email or web address]  D:  10/23/2019 14:03  T:  10/24/2019 4:13  JOB #:  [id number]

## 2019-10-24 NOTE — DISCHARGE INSTRUCTIONS
Discharge Instructions for Total Knee Replacement Patients    · The dressing on your knee will be changed by the Home Health professional at the appropriate time. Keep your incision clean and dry. Do not apply any ointments to the incision. · You may shower as long as you keep you incision dry. When showering, leave your dressing on. The dressing is waterproof as long as the edges are sealed. · Notify your surgeon if:  · Your temperature is greater than 100.5  · You have pain not controlled by your pain medication  · You have increased drainage from your incision  · You have increased redness or swelling in your leg  · You have chest pain, shortness of breath, or any other problems    · Do your exercises as instructed by the home physical therapist.    · Bend and straighten your operative leg every hour. Walk once an hour during normal walking hours. · During periods of inactivity or rest, your leg should be elevated with a rolled towel or folded pillow under the heel to keep the knee straight. · Do not place anything under the knee. · Do not sit in a recliner chair with the footrest elevated. · You may use ice to your knee for 20-30 minutes after exercise and as needed. Do not apply the ice pack directly to your skin. Use a barrier such as your pant leg or a thin towel. · If you have LANDRY hose (the white support stockings), remove them at bedtime and re-apply the hose in the morning for the next 2 weeks. Best of luck with your new knee and [address] YOU for choosing the [address]!

## 2019-10-24 NOTE — PROGRESS NOTES
Problem: Mobility Impaired (Adult and Pediatric)  Goal: *Acute Goals and Plan of Care (Insert Text)  Description  Physical Therapy Goals   Initiated 10/23/2019 and to be accomplished within 7 days. 1.  Patient will complete all bed mobility with modified independence in order to prepare for EOB/OOB activity. 2.  Patient will perform sit <> stand with modified independence in order to prepare for OOB/gait activity. 3.  Patient will perform bed to chair transfers with modified independence in order to promote mobility and encourage seated activity to progress towards their prior level of function. 4.  Patient will ambulate 250 feet with modified independence using LRAD in order to prepare for safe negotiation of their environment. 5.  Patient will ascend/descend 1 steps with S handrail use with modified independence in order to prepare for safe exit/entry into their home environment. PLOF: Patient lives with family in a single story apartment with 1 JAN. Independent PTA. Outcome: Progressing Towards Goal     PHYSICAL THERAPY TREATMENT    Patient: Erwin Landry (85 y.o. female)  Date: 10/24/2019  Diagnosis: Failed [facility] knee [facility] [T84.018A, Z96.659] <principal problem not specified>  Procedure(s) (LRB):  RIGHT TOTAL KNEE REVISION (Right) 1 Day Post-Op  Precautions: WBAT      ASSESSMENT:  Pt found supine HOB elevated willing to participate w/ therapy. Pt voiced comfort w/ prior therapy and understands limitations as well as needs to cont to progress appropriately. Pt mobilized well this visit req little to no assistance voicing little increase in pain, mostly soreness. Pt performed all bed mobility and sit <> stand transfers w/ good balance and stability. Pt amb for a total of 600' this visit w/ a reciprocal pattern from start. Pt displays good functional knee ROM during gait sequencing, displaying a slight antalgic pattern 2/2 increased soreness.  Pt also safely performed 8 steps w/ good recall of step sequencing from previous surgery voicing no increase in pain. Pt returned to room to recliner, performed there-ex in aims to improve knee flexibility and strength to further improve functional mobility of knee during sit <> stands as well gait. Pt provided further education on safety, importance of cont mobility, and left in room w/ all needs in reach. From a PT standpoint, pt is safe to return home and would benefit from home health PT. Nursing notified. ROM: 0-110   Progression toward goals: good   ? Improving appropriately and progressing toward goals  ? Improving slowly and progressing toward goals  ? Not making progress toward goals and plan of care will be adjusted     PLAN:  Patient continues to benefit from skilled intervention to address the above impairments. Continue treatment per established plan of care. Discharge Recommendations:  Home Health  Further Equipment Recommendations for Discharge:  rolling walker     SUBJECTIVE:   Patient stated This feels great!     OBJECTIVE DATA SUMMARY:   Critical Behavior:  Neurologic State: Alert  Orientation Level: Oriented X4  Cognition: Follows commands  Functional Mobility Training:  Bed Mobility:  Supine to Sit: Supervision  Transfers:  Sit to Stand: Supervision  Stand to Sit: Supervision  Balance:  Sitting: Intact  Standing: Impaired; With support  Standing - Static: Good  Standing - Dynamic : Fair(+)  Ambulation/Gait Training:  Distance (ft): 600 Feet (ft)  Assistive Device: Walker, rolling  Ambulation - Level of Assistance: Stand-by assistance;Supervision  Gait Abnormalities: Decreased step clearance; Antalgic  Base of Support: Center of gravity altered  Stairs:  Number of Stairs Trained: 4  Stairs - Level of Assistance: Stand-by assistance;Supervision  Rail Use: Both  Therapeutic Exercises:   Therapeutic Exercises:       EXERCISE   Sets   Reps   Active Active Assist   Passive Self- assited ROM   Comments   Ankle Pumps 1 10 ? ? ? ?    Quad Sets 1 10 ? ? ? ? Seated knee hangs  1 10 ? ? ? ? Short Arc Quads   ? ? ? ? Heel Slides 1 10 ? ? ? ? Straight Leg Raises 1 10 ? ? ? ? Hip Abd   ? ? ? ? Long Arc Quads   ? ? ? ? Seated Marching   ? ? ? ? Seated Knee Flexion   ? ? ? ? Standing Marching   ? ? ? ? Pain:  Pain level pre-treatment: 0/10  Pain level post-treatment: 0/10       Activity Tolerance:   Good   Please refer to the flowsheet for vital signs taken during this treatment. After treatment:   ? Patient left in no apparent distress sitting up in chair  ? Patient left in no apparent distress in bed  ? Call bell left within reach  ? Nursing notified  ? Caregiver present  ? Bed alarm activated  ? SCDs applied      COMMUNICATION/EDUCATION:   ?         Role of Physical Therapy in the acute care setting. ?         Fall prevention education was provided and the patient/caregiver indicated understanding. ? Patient/family have participated as able in working toward goals and plan of care. ?         Patient/family agree to work toward stated goals and plan of care. ?         Patient understands intent and goals of therapy, but is neutral about his/her participation. ? Patient is unable to participate in stated goals/plan of care: ongoing with therapy staff.   ?         Other:        Dany Leroy PTA   Time Calculation: 24 mins

## 2019-10-24 NOTE — PROGRESS NOTES
Ortho    Pt. Seen and evaluated. Doing well, pain well controlled, progressing well with PT  Denies cp, sob, abd pain    Blood pressure 102/75, pulse 98, temperature 97.8 °F (36.6 °C), resp. rate 17, height 5' 6\" (1.676 m), weight 194 lb 9.6 oz (88.3 kg), SpO2 100 %. rightKnee woundclean, dry, no drainage  Sensory intact to LT  Motor intact  nv intact  Neg calf tenderness    Labs:  CBC  @  CBC:   Lab Results   Component Value Date/Time    WBC 7.0 10/09/2019 07:15 AM    RBC 4.79 10/09/2019 07:15 AM    HGB 13.5 10/09/2019 07:15 AM    HCT 41.5 10/09/2019 07:15 AM    PLATELET 679 96/31/6636 07:15 AM     BMP:   Lab Results   Component Value Date/Time    Glucose 95 10/09/2019 07:15 AM    Sodium 144 10/09/2019 07:15 AM    Potassium 3.6 10/09/2019 07:15 AM    Chloride 110 10/09/2019 07:15 AM    CO2 32 10/09/2019 07:15 AM    BUN 9 10/09/2019 07:15 AM    Creatinine 0.81 10/09/2019 07:15 AM    Calcium 9.3 10/09/2019 07:15 AM   @  Coagulation  Lab Results   Component Value Date    INR 0.9 10/09/2019    APTT 28.2 10/09/2019      Basic Metabolic Profile  Lab Results   Component Value Date     10/09/2019    CO2 32 10/09/2019    BUN 9 10/09/2019       Assesment: rightOrthopedic / Rheumatologic: Total Knee Replacement  Past Medical History:   Diagnosis Date    Arthritis     Elevated cholesterol     no meds     GERD (gastroesophageal reflux disease)     Hypertension      ASA: 2    Status post joint replacement pt with risk of bleeding, blood clots, and infection. Plan: aspirin, PT, DC to home if cleared by PT and ok with medicine.

## 2019-10-24 NOTE — PROGRESS NOTES
conducted an initial consultation and Spiritual Assessment for Delia Joe, who is a 61 y.o.,female. Patient's Primary Language is: [place]. According to the patient's EMR Episcopal Affiliation is: Shinto. The reason the Patient came to the hospital is:   Patient Active Problem List    Diagnosis Date Noted    Failed total knee replacement ([facility] [address] 10/23/2019    Arthritis of knee, right 06/06/2018        The  provided the following Interventions:  Initiated a relationship of care and support. Explored issues of leanne, belief, spirituality and Tenriism/ritual needs while hospitalized. Listened empathically. Offered prayer and assurance of continued prayers on patient's behalf. The following outcomes where achieved:  Patient shared limited information about both their medical narrative and spiritual journey/beliefs.  confirmed Patient's Episcopal Affiliation. Patient processed feeling about current hospitalization. Patient expressed gratitude for 's visit. Assessment:  Patient does not have any Tenriism/cultural needs that will affect patient's preferences in health care. There are no spiritual or Tenriism issues which require intervention at this time. Plan:  Chaplains will continue to follow and will provide pastoral care on an as needed/requested basis.  recommends bedside caregivers page  on duty if patient shows signs of acute spiritual or emotional distress.  Fr.  [address]   [phone]

## 2019-10-24 NOTE — HOME CARE
Rec HC order - d/c noted for today [facility] will follow per Dr. Melissa Peres knee protocol - D Donavan ROSSI

## 2019-10-24 NOTE — DISCHARGE SUMMARY
10/23/2019  9:02 AM    10/24/2019, 8:12 AM    Primary Dx:right Orthopedic / Rheumatologic: Total Knee Replacement, revision  Secondary Dx: Etiological Diagnoses: none    HPI:  Pt had a previous TKA and developed loosening of the components. She had pain and instability of the knee. She was worked up for infection and fortunately negative. Due to the current findings and affected activity of daily living surgical intervention is indicated.   The alternatives, risks, complications as well as expected outcome were discussed, the patient understands and wishes to proceed with surgery    Past Medical History:   Diagnosis Date    Arthritis     Elevated cholesterol     no meds     GERD (gastroesophageal reflux disease)     Hypertension          Current Facility-Administered Medications:     hydroCHLOROthiazide (HYDRODIURIL) tablet 25 mg, 25 mg, Oral, DAILY, Holly Forbes MD    nitrofurantoin (MACRODANTIN) capsule 50 mg, 50 mg, Oral, QID, Constance Steel MD, 50 mg at 10/23/19 2201    pantoprazole (PROTONIX) tablet 40 mg, 40 mg, Oral, ACB&D, Constance Steel MD, 40 mg at 10/23/19 1649    0.9% sodium chloride infusion, 100 mL/hr, IntraVENous, CONTINUOUS, Constance Steel MD, Last Rate: 100 mL/hr at 10/24/19 0631, 100 mL/hr at 10/24/19 0631    sodium chloride (NS) flush 5-40 mL, 5-40 mL, IntraVENous, Q8H, Kwabena Forbes MD, 10 mL at 10/24/19 0540    sodium chloride (NS) flush 5-40 mL, 5-40 mL, IntraVENous, PRN, Constance Steel MD    ferrous sulfate tablet 325 mg, 1 Tab, Oral, BID WITH MEALS, Constance Steel MD, 325 mg at 10/23/19 1649    zolpidem (AMBIEN) tablet 5 mg, 5 mg, Oral, QHS PRN, Constance Steel MD    acetaminophen (TYLENOL) tablet 1,000 mg, 1,000 mg, Oral, Q6H, Constance Steel MD, 1,000 mg at 10/24/19 0539    oxyCODONE IR (ROXICODONE) tablet 10-15 mg, 10-15 mg, Oral, Q3H PRN, Constance Steel MD, 10 mg at 10/23/19 1649    celecoxib (CELEBREX) capsule 200 mg, 200 mg, Oral, BID, Alpesh Arevalo MD RUDI, 200 mg at 10/23/19 1804    naloxone [facility]) injection 0.4 mg, 0.4 mg, IntraVENous, PRN, Adilia Argueta MD    flumazenil (ROMAZICON) 0.1 mg/mL injection 0.2 mg, 0.2 mg, IntraVENous, PRN, Adilia Argueta MD    aspirin delayed-release tablet 325 mg, 325 mg, Oral, BID, Adilia Argueta MD    ceFAZolin (ANCEF) 2g IVPB in 50 mL D5W, 2 g, IntraVENous, Q8H, Adilia Argueta MD, Last Rate: 100 mL/hr at 10/24/19 0229, 2 g at 10/24/19 0229    ondansetron (ZOFRAN) injection 4 mg, 4 mg, IntraVENous, Q4H PRN, Adilia Argueta MD, 4 mg at 10/23/19 1648    senna-docusate (PERICOLACE) 8.6-50 mg per tablet 1 Tab, 1 Tab, Oral, BID, Adilia Argueta MD, 1 Tab at 10/23/19 1804    pregabalin (LYRICA) capsule 25 mg, 25 mg, Oral, TID, Adilia Argueta MD, 25 mg at 10/23/19 2200    Patient has no known allergies. Physical Exam:  General A&O x3 NAD, well developed, well nourished, normal affect  Heart: S1-S2, RRR  Lungs: CTA Bilat  Abd: soft NT, ND  Ext: n/v intact    Hospital Course:    Pt. Had rightOrthopedic / Rheumatologic: Total Knee Replacement, revision    Post -op Course: The patient tolerated the procedure well. They were followed by internal medicine for help with medical management. Pt. Was place on Abx pre and post-op for prophylaxis against infection as well as coumadin pre and post-op for prophylaxis against DVT. Vitals signs remained stable, remained af. The wound wasclean, dry, no drainage. Pain was well controlled. Pt. Had negative calf tenderness or swelling, no evidence for DVT. Patient had PT/OT consult for evaluation and treatment.     CBC  Lab Results   Component Value Date/Time    WBC 7.0 10/09/2019 07:15 AM    RBC 4.79 10/09/2019 07:15 AM    HCT 41.5 10/09/2019 07:15 AM    MCV 86.6 10/09/2019 07:15 AM    MCH 28.2 10/09/2019 07:15 AM    MCHC 32.5 10/09/2019 07:15 AM    RDW 13.6 10/09/2019 07:15 AM     Coagulation  Lab Results   Component Value Date    INR 0.9 10/09/2019    APTT 28.2 10/09/2019      Basic Metabolic Profile  Lab Results   Component Value Date     10/09/2019    CO2 32 10/09/2019    BUN 9 10/09/2019       Discharge Meds:  Current Discharge Medication List      START taking these medications    Details   aspirin delayed-release 325 mg tablet Take 1 Tab by mouth two (2) times a day. Qty: 60 Tab, Refills: 1    Associated Diagnoses: Failure of total knee replacement, subsequent encounter      celecoxib (CELEBREX) 200 mg capsule Take 1 Cap by mouth two (2) times a day for 90 days. Qty: 60 Cap, Refills: 2    Associated Diagnoses: Failure of total knee replacement, subsequent encounter      ferrous sulfate 325 mg (65 mg iron) tablet Take 1 Tab by mouth two (2) times daily (with meals). Qty: 60 Tab, Refills: 1    Associated Diagnoses: Failure of total knee replacement, subsequent encounter      oxyCODONE-acetaminophen (PERCOCET) 7.5-325 mg per tablet Take 1-2 Tabs by mouth every six (6) hours as needed for Pain for up to 7 days. Max Daily Amount: 8 Tabs. Qty: 56 Tab, Refills: 0    Associated Diagnoses: Failure of total knee replacement, subsequent encounter      docusate sodium (COLACE) 100 mg capsule Take 1 Cap by mouth two (2) times a day for 90 days. Qty: 60 Cap, Refills: 2    Associated Diagnoses: Failure of total knee replacement, subsequent encounter         CONTINUE these medications which have NOT CHANGED    Details   VITAMIN D2 50,000 unit capsule take 1 capsule by mouth every week  Refills: 0      hydroCHLOROthiazide (HYDRODIURIL) 25 mg tablet take 1 tablet by mouth once daily  Refills: 0      Omeprazole delayed release (PRILOSEC D/R) 20 mg tablet take 1 tablet by mouth once daily  Refills: 0         STOP taking these medications       nitrofurantoin, macrocrystal-monohydrate, (MACROBID) 100 mg capsule Comments:   Reason for Stopping:               Discharge Plan:  The patient will be d/c'd to home, total knee protocol, WBAT. She will have [facility] PT and nursing.   Total joint protocol. Pt safe for homebound transfer, sp Total joint replacement. A walker, bedside commode, and shower chair will be utilized for ADL's. Follow up with Dr. Bernadine Jewell in 10-12 days. Call with any questions or concerns.

## 2019-10-25 ENCOUNTER — HOME CARE VISIT (OUTPATIENT)
Dept: SCHEDULING | Facility: HOME HEALTH | Age: 59
End: 2019-10-25
Payer: COMMERCIAL

## 2019-10-25 PROCEDURE — G0151 HHCP-SERV OF PT,EA 15 MIN: HCPCS

## 2019-10-25 PROCEDURE — 400013 HH SOC

## 2019-10-25 NOTE — PROGRESS NOTES
OCCUPATIONAL THERAPY EVALUATION/DISCHARGE    Patient: Sunitha Thomas (53 y.o. female)  Date: 10/24/2019  Primary Diagnosis: Failed total knee replacement ([facility] [address] [T84.018A, Z96.659]  Procedure(s) (LRB):  RIGHT TOTAL KNEE REVISION (Right) 1 Day Post-Op   Precautions:   Fall, WBAT  PLOF: Pt was independent with basic self care tasks and functional mobility PTA. ASSESSMENT AND RECOMMENDATIONS:  Based on the objective data described below, the patient is able to perform basic self care tasks without assistance. Supervision given for functional standing and transfers. Will defer to PT for mobility training. Patient has a supportive granddaughter at home to assist her prn. Discussed use of a seat in the shower for safety and patient to purchase upon discharge if needed. Skilled occupational therapy is not indicated at this time. Discharge Recommendations: None  Further Equipment Recommendations for Discharge: N/A      SUBJECTIVE:   Patient stated I've been through this before.     OBJECTIVE DATA SUMMARY:     Past Medical History:   Diagnosis Date    Arthritis     Elevated cholesterol     no meds     GERD (gastroesophageal reflux disease)     Hypertension      Past Surgical History:   Procedure Laterality Date    HX  SECTION      x 4    HX KNEE ARTHROSCOPY Right     HX KNEE REPLACEMENT Right 2018    HX TUBAL LIGATION       Barriers to Learning/Limitations: None  Compensate with: visual, verbal, tactile, kinesthetic cues/model    Home Situation:   Home Situation  Home Environment: Apartment  # Steps to Enter: 1  One/Two Story Residence: One story  Living Alone: No  Support Systems: Family member(s)  Patient Expects to be Discharged to[de-identified] Apartment  Current DME Used/Available at Home: Cane, straight, Walker, rolling  Tub or Shower Type: Tub/Shower combination  ? Right hand dominant   ?      Left hand dominant    Cognitive/Behavioral Status:  Neurologic State: Alert  Orientation Level: Oriented X4  Cognition: Appropriate decision making; Follows commands  Safety/Judgement: Awareness of environment; Fall prevention    Skin: Intact on UEs  Edema: None noted in UEs    Vision/Perceptual:    Acuity: Within Defined Limits      Coordination: BUE  Fine Motor Skills-Upper: Left Intact; Right Intact    Gross Motor Skills-Upper: Left Intact; Right Intact    Balance:  Sitting: Intact  Standing: Impaired; With support  Standing - Static: Good  Standing - Dynamic : Fair(+)    Strength: BUE  Strength: Within functional limits    Tone & Sensation: BUE  Tone: Normal  Sensation: Intact    Range of Motion: BUE  AROM: Within functional limits    Functional Mobility and Transfers for ADLs:  Bed Mobility:  Supine to Sit: Supervision    Transfers:  Sit to Stand: Supervision  Stand to Sit: Supervision   Toilet Transfer : Supervision    ADL Assessment:  Feeding: Independent    Oral Facial Hygiene/Grooming: Independent    Bathing: Supervision    Upper Body Dressing: Independent    Lower Body Dressing: Supervision    Toileting: Modified independent    ADL Intervention:  Patient practiced UB/LB bathing (CHG wipes) and dressing while seated and in standing. No assist given after initial VCs for safety/ease of performance. Supervision given for standing balance. No LOB noted. Cognitive Retraining  Safety/Judgement: Awareness of environment; Fall prevention    Pain:  Pain level pre-treatment: 0/10   Pain level post-treatment: 0/10   Pain Intervention(s): Medication (see MAR); Rest, Ice, Repositioning   Response to intervention: Nurse notified, See doc flow    Activity Tolerance:   Good  Please refer to the flowsheet for vital signs taken during this treatment. After treatment:   ?  Patient left in no apparent distress sitting up in chair  ? Patient left in no apparent distress in bed  ? Call bell left within reach  ? Nursing notified  ? Caregiver present  ?   Bed alarm activated    COMMUNICATION/EDUCATION:   ?      Role of Occupational Therapy in the acute care setting  ? Home safety education was provided and the patient/caregiver indicated understanding. ? Patient/family have participated as able and agree with findings and recommendations. ?      Patient is unable to participate in plan of care at this time. Thank you for this referral.  Sera Watters MS OTR/L   Time Calculation: 26 mins      Eval Complexity: History: LOW Complexity : Brief history review ; Examination: LOW Complexity : 1-3 performance deficits relating to physical, cognitive , or psychosocial skils that result in activity limitations and / or participation restrictions ;    Decision Making:LOW Complexity : No comorbidities that affect functional and no verbal or physical assistance needed to complete eval tasks

## 2019-10-26 ENCOUNTER — HOME CARE VISIT (OUTPATIENT)
Dept: SCHEDULING | Facility: HOME HEALTH | Age: 59
End: 2019-10-26
Payer: COMMERCIAL

## 2019-10-26 VITALS
RESPIRATION RATE: 16 BRPM | DIASTOLIC BLOOD PRESSURE: 70 MMHG | HEART RATE: 76 BPM | SYSTOLIC BLOOD PRESSURE: 120 MMHG | TEMPERATURE: 97.2 F

## 2019-10-26 VITALS
OXYGEN SATURATION: 98 % | SYSTOLIC BLOOD PRESSURE: 128 MMHG | DIASTOLIC BLOOD PRESSURE: 74 MMHG | TEMPERATURE: 98.4 F | HEART RATE: 90 BPM

## 2019-10-26 PROCEDURE — G0157 HHC PT ASSISTANT EA 15: HCPCS

## 2019-10-26 PROCEDURE — G0299 HHS/HOSPICE OF RN EA 15 MIN: HCPCS

## 2019-10-27 ENCOUNTER — HOME CARE VISIT (OUTPATIENT)
Dept: SCHEDULING | Facility: HOME HEALTH | Age: 59
End: 2019-10-27
Payer: COMMERCIAL

## 2019-10-27 VITALS
TEMPERATURE: 97 F | OXYGEN SATURATION: 98 % | HEART RATE: 86 BPM | DIASTOLIC BLOOD PRESSURE: 80 MMHG | SYSTOLIC BLOOD PRESSURE: 120 MMHG | RESPIRATION RATE: 20 BRPM

## 2019-10-27 PROCEDURE — G0157 HHC PT ASSISTANT EA 15: HCPCS

## 2019-10-28 ENCOUNTER — HOME CARE VISIT (OUTPATIENT)
Dept: HOME HEALTH SERVICES | Facility: HOME HEALTH | Age: 59
End: 2019-10-28
Payer: COMMERCIAL

## 2019-10-28 ENCOUNTER — HOME CARE VISIT (OUTPATIENT)
Dept: SCHEDULING | Facility: HOME HEALTH | Age: 59
End: 2019-10-28
Payer: COMMERCIAL

## 2019-10-28 VITALS
OXYGEN SATURATION: 99 % | SYSTOLIC BLOOD PRESSURE: 130 MMHG | TEMPERATURE: 99.1 F | HEART RATE: 84 BPM | DIASTOLIC BLOOD PRESSURE: 74 MMHG

## 2019-10-28 PROCEDURE — A6204 COMPOSITE DRSG >16<=48 SQ IN: HCPCS

## 2019-10-28 PROCEDURE — G0157 HHC PT ASSISTANT EA 15: HCPCS

## 2019-10-29 ENCOUNTER — HOME CARE VISIT (OUTPATIENT)
Dept: SCHEDULING | Facility: HOME HEALTH | Age: 59
End: 2019-10-29
Payer: COMMERCIAL

## 2019-10-29 VITALS
TEMPERATURE: 97.8 F | RESPIRATION RATE: 18 BRPM | RESPIRATION RATE: 18 BRPM | DIASTOLIC BLOOD PRESSURE: 65 MMHG | HEART RATE: 66 BPM | OXYGEN SATURATION: 98 % | SYSTOLIC BLOOD PRESSURE: 119 MMHG | TEMPERATURE: 98.4 F | SYSTOLIC BLOOD PRESSURE: 132 MMHG | OXYGEN SATURATION: 98 % | DIASTOLIC BLOOD PRESSURE: 72 MMHG | SYSTOLIC BLOOD PRESSURE: 112 MMHG | RESPIRATION RATE: 16 BRPM | HEART RATE: 72 BPM | HEART RATE: 80 BPM | OXYGEN SATURATION: 98 % | TEMPERATURE: 99 F | DIASTOLIC BLOOD PRESSURE: 80 MMHG

## 2019-10-29 PROCEDURE — G0299 HHS/HOSPICE OF RN EA 15 MIN: HCPCS

## 2019-10-29 PROCEDURE — G0157 HHC PT ASSISTANT EA 15: HCPCS

## 2019-10-30 ENCOUNTER — HOME CARE VISIT (OUTPATIENT)
Dept: SCHEDULING | Facility: HOME HEALTH | Age: 59
End: 2019-10-30
Payer: COMMERCIAL

## 2019-10-30 PROCEDURE — G0157 HHC PT ASSISTANT EA 15: HCPCS

## 2019-10-31 ENCOUNTER — HOME CARE VISIT (OUTPATIENT)
Dept: SCHEDULING | Facility: HOME HEALTH | Age: 59
End: 2019-10-31
Payer: COMMERCIAL

## 2019-10-31 VITALS
RESPIRATION RATE: 18 BRPM | SYSTOLIC BLOOD PRESSURE: 119 MMHG | TEMPERATURE: 99.1 F | HEART RATE: 73 BPM | DIASTOLIC BLOOD PRESSURE: 77 MMHG | OXYGEN SATURATION: 99 %

## 2019-10-31 PROCEDURE — G0157 HHC PT ASSISTANT EA 15: HCPCS

## 2019-11-01 ENCOUNTER — HOME CARE VISIT (OUTPATIENT)
Dept: SCHEDULING | Facility: HOME HEALTH | Age: 59
End: 2019-11-01
Payer: COMMERCIAL

## 2019-11-01 VITALS
SYSTOLIC BLOOD PRESSURE: 132 MMHG | OXYGEN SATURATION: 99 % | DIASTOLIC BLOOD PRESSURE: 78 MMHG | TEMPERATURE: 97.8 F | HEART RATE: 65 BPM

## 2019-11-01 PROCEDURE — G0151 HHCP-SERV OF PT,EA 15 MIN: HCPCS

## 2019-11-02 ENCOUNTER — HOME CARE VISIT (OUTPATIENT)
Dept: SCHEDULING | Facility: HOME HEALTH | Age: 59
End: 2019-11-02
Payer: COMMERCIAL

## 2019-11-02 VITALS
OXYGEN SATURATION: 99 % | SYSTOLIC BLOOD PRESSURE: 136 MMHG | HEART RATE: 82 BPM | TEMPERATURE: 99 F | DIASTOLIC BLOOD PRESSURE: 82 MMHG

## 2019-11-02 PROCEDURE — G0157 HHC PT ASSISTANT EA 15: HCPCS

## 2019-11-03 ENCOUNTER — HOME CARE VISIT (OUTPATIENT)
Dept: SCHEDULING | Facility: HOME HEALTH | Age: 59
End: 2019-11-03
Payer: COMMERCIAL

## 2019-11-03 PROCEDURE — G0157 HHC PT ASSISTANT EA 15: HCPCS

## 2019-11-04 ENCOUNTER — HOME CARE VISIT (OUTPATIENT)
Dept: SCHEDULING | Facility: HOME HEALTH | Age: 59
End: 2019-11-04
Payer: COMMERCIAL

## 2019-11-04 VITALS
DIASTOLIC BLOOD PRESSURE: 80 MMHG | OXYGEN SATURATION: 98 % | SYSTOLIC BLOOD PRESSURE: 124 MMHG | TEMPERATURE: 98.4 F | RESPIRATION RATE: 18 BRPM | HEART RATE: 70 BPM

## 2019-11-04 VITALS
SYSTOLIC BLOOD PRESSURE: 124 MMHG | OXYGEN SATURATION: 98 % | HEART RATE: 80 BPM | DIASTOLIC BLOOD PRESSURE: 72 MMHG | TEMPERATURE: 98.4 F

## 2019-11-04 VITALS
DIASTOLIC BLOOD PRESSURE: 77 MMHG | HEART RATE: 75 BPM | OXYGEN SATURATION: 98 % | SYSTOLIC BLOOD PRESSURE: 108 MMHG | RESPIRATION RATE: 17 BRPM | TEMPERATURE: 97.8 F

## 2019-11-04 PROCEDURE — G0157 HHC PT ASSISTANT EA 15: HCPCS

## 2019-11-05 ENCOUNTER — HOME CARE VISIT (OUTPATIENT)
Dept: SCHEDULING | Facility: HOME HEALTH | Age: 59
End: 2019-11-05
Payer: COMMERCIAL

## 2019-11-05 PROCEDURE — G0300 HHS/HOSPICE OF LPN EA 15 MIN: HCPCS

## 2019-11-05 PROCEDURE — G0157 HHC PT ASSISTANT EA 15: HCPCS

## 2019-11-06 ENCOUNTER — HOME CARE VISIT (OUTPATIENT)
Dept: SCHEDULING | Facility: HOME HEALTH | Age: 59
End: 2019-11-06
Payer: COMMERCIAL

## 2019-11-06 VITALS
HEART RATE: 70 BPM | OXYGEN SATURATION: 98 % | SYSTOLIC BLOOD PRESSURE: 110 MMHG | RESPIRATION RATE: 18 BRPM | TEMPERATURE: 99 F | DIASTOLIC BLOOD PRESSURE: 60 MMHG

## 2019-11-06 VITALS
SYSTOLIC BLOOD PRESSURE: 142 MMHG | OXYGEN SATURATION: 96 % | RESPIRATION RATE: 16 BRPM | TEMPERATURE: 97.5 F | HEART RATE: 84 BPM | DIASTOLIC BLOOD PRESSURE: 80 MMHG

## 2019-11-06 PROCEDURE — G0157 HHC PT ASSISTANT EA 15: HCPCS

## 2019-11-07 ENCOUNTER — OFFICE VISIT (OUTPATIENT)
Dept: ORTHOPEDIC SURGERY | Facility: CLINIC | Age: 59
End: 2019-11-07

## 2019-11-07 ENCOUNTER — HOME CARE VISIT (OUTPATIENT)
Dept: SCHEDULING | Facility: HOME HEALTH | Age: 59
End: 2019-11-07
Payer: COMMERCIAL

## 2019-11-07 VITALS
WEIGHT: 187.8 LBS | OXYGEN SATURATION: 98 % | BODY MASS INDEX: 30.18 KG/M2 | HEIGHT: 66 IN | HEART RATE: 82 BPM | TEMPERATURE: 97 F | DIASTOLIC BLOOD PRESSURE: 53 MMHG | SYSTOLIC BLOOD PRESSURE: 127 MMHG | RESPIRATION RATE: 18 BRPM

## 2019-11-07 VITALS
DIASTOLIC BLOOD PRESSURE: 80 MMHG | OXYGEN SATURATION: 99 % | SYSTOLIC BLOOD PRESSURE: 116 MMHG | HEART RATE: 79 BPM | TEMPERATURE: 97.5 F

## 2019-11-07 VITALS
TEMPERATURE: 98.6 F | DIASTOLIC BLOOD PRESSURE: 72 MMHG | RESPIRATION RATE: 18 BRPM | HEART RATE: 67 BPM | OXYGEN SATURATION: 98 % | SYSTOLIC BLOOD PRESSURE: 119 MMHG

## 2019-11-07 DIAGNOSIS — M17.11 ARTHRITIS OF KNEE, RIGHT: ICD-10-CM

## 2019-11-07 DIAGNOSIS — Z96.651 STATUS POST TOTAL RIGHT KNEE REPLACEMENT: ICD-10-CM

## 2019-11-07 DIAGNOSIS — Z96.651 H/O TOTAL KNEE REPLACEMENT, RIGHT: Primary | ICD-10-CM

## 2019-11-07 DIAGNOSIS — G89.18 POST-OPERATIVE PAIN: ICD-10-CM

## 2019-11-07 PROCEDURE — G0151 HHCP-SERV OF PT,EA 15 MIN: HCPCS

## 2019-11-07 RX ORDER — OXYCODONE AND ACETAMINOPHEN 7.5; 325 MG/1; MG/1
TABLET ORAL
COMMUNITY
End: 2019-11-07 | Stop reason: SDUPTHER

## 2019-11-07 RX ORDER — OXYCODONE AND ACETAMINOPHEN 7.5; 325 MG/1; MG/1
1 TABLET ORAL
Qty: 28 TAB | Refills: 0 | Status: SHIPPED | OUTPATIENT
Start: 2019-11-07 | End: 2019-11-14

## 2019-11-07 NOTE — PROGRESS NOTES
[address] [place] [address]   OPERATIVE REPORT     Name:  Tony Butler  MR#:   [id number]  :  1960  ACCOUNT #:  [de-identified]  DATE OF SERVICE:  10/23/2019     PREOPERATIVE DIAGNOSES:  Failed right total knee replacement with loose tibial baseplate and instability of the knee, including recurvatum and mid-flexion instability.     POSTOPERATIVE DIAGNOSES:  Failed right total knee replacement with loose tibial baseplate and instability of the knee, including recurvatum and mid-flexion instability.     PROCEDURES PERFORMED:  1. Full revision of the right knee using the Vocent system with a size 4 right TS femoral component, a size 3 tibia, and each femoral and tibial component had a 15 x 50 cemented stem. The femoral component also had 10 mm distal wedges and 5 mm posterior wedges and finally, a size 3, 19 TS insert. 2.  Lateral release. 3.  Reestablishing of canals. 4.  Removal of extensive amount of cement on the tibial side. 5.  Rebalancing of knee.     SURGEON:  Carl Gaxiola MD.     FIRST ASSISTANT:  Reinier Koroma    HISTORY OF PRESENT ILLNESS:  Patricia Mike returns 13 days status post a revision of a failed, right total knee replacement which had a loose tibial base plate causing instability of the knee under the care of Dr. Yuridia Gunn. The patient is at home currently. She has just finished her in-home physical therapy. She requests pain medication today. The patient is continuing her exercises on her own, alternating days with therapy. She is walking with a single-post cane on the right near 75% full weightbearing of the right lower extremity. PHYSICAL EXAM:  The anterior surface of the right knee in a cranial/caudal orientation reveals a 21-centimeter surgical incision intact with skin staples noted. The wound borders are well approximated. Soft tissue swelling bracketing the surgical site is present. No indurations, no erythema, and no warmth.      PLAN:   Today, all staples were removed with no complications, and Steri-Strips were placed with a sterile top cover. The patient may wash the site following. A sterile cover dressing was placed, which she will remove before washing. She will avoid any soaking. Outpatient physical therapy will begin per protocol of Dr. Silvia Mallory total joint replacement revision instruction-specific. The patient is still taking her aspirin for thromboembolism prophylaxis. Pain medication to taper.

## 2019-11-11 ENCOUNTER — HOSPITAL ENCOUNTER (OUTPATIENT)
Dept: PHYSICAL THERAPY | Age: 59
Discharge: HOME | End: 2019-11-11
Payer: COMMERCIAL

## 2019-11-11 PROCEDURE — 97161 PT EVAL LOW COMPLEX 20 MIN: CPT

## 2019-11-11 PROCEDURE — 97110 THERAPEUTIC EXERCISES: CPT

## 2019-11-11 NOTE — PROGRESS NOTES
PT DAILY TREATMENT NOTE/KNEE EVAL 10-18    Patient Name: Aaron Young  Date:2019  : 1960  [x]  Patient  Verified  Payor: BLUE CROSS / Plan: Cynthia Hernández [id number] PPO / Product Type: PPO /    In time: 2:35  Out time: 3:04  Total Treatment Time (min): 29  Visit #: 1 of 12    Medicare/BCBS Only   Total Timed Codes (min):  8 1:1 Treatment Time:  29     Treatment Area: Presence of right artificial knee joint [Z96.651]  Other acute postprocedural pain [G89.18]  Unilateral primary osteoarthritis, right knee [M17.11]    SUBJECTIVE  Pain Level (0-10 scale):  4/10  []constant []intermittent []improving []worsening []no change since onset    Any medication changes, allergies to medications, adverse drug reactions, diagnosis change, or new procedure performed?: [x] No    [] Yes (see summary sheet for update)  Subjective functional status/changes:       Mechanism of Injury:  10/23/19 right TKA revision.  was first TKA. Home health PT after. Cane for community ambulation and no AD at home. Difficulty with bending. 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 Living Situation: lives with grand daughter that helps with some things around house. Pt Goals: walking and standing straight.       OBJECTIVE/EXAMINATION    8 min Therapeutic Exercise:  [x] See flow sheet :   Rationale: increase ROM and increase strength to improve the patients ability to increase ease of ADLs          With   [x] TE   [] TA   [] neuro   [] other: Patient Education: [x] Review HEP    [] Progressed/Changed HEP based on:   [] positioning   [] body mechanics   [] transfers   [] heat/ice application    [] other:      Physical Therapy Evaluation - Knee    Gait:  [] Normal    [x] Abnormal    [x] Antalgic    [] NWB    Device: SPC    Describe: decreased weight shift to right side    ROM / Strength  [] Unable to assess                  AROM                      PROM                   Strength (1-5)    Left Right Left Right Left Right Hip Flexion     4 4    Extension          Abduction      4    Adduction         Knee Flexion 135 103   4+ 4+    Extension -3 4   4+ 4   Ankle Plantarflexion     4+ 4+    Dorsiflexion     4+ 4+       Flexibility: [] Unable to assess at this time  Hamstrings:    (L) Tightness= [] WNL   [x] Min   [] Mod   [] Severe    (R) Tightness= [] WNL   [x] Min   [] Mod   [] Severe  Quadriceps:    (L) Tightness= [] WNL   [] Min   [] Mod   [] Severe    (R) Tightness= [] WNL   [] Min   [] Mod   [] Severe  Gastroc:      (L) Tightness= [] WNL   [x] Min   [] Mod   [] Severe    (R) Tightness= [] WNL   [x] Min   [] Mod   [] Severe  Other:    Palpation: no tenderness to palpation    Neg/Pos  Neg/Pos  Neg/Pos   Joint Line  Quad tendon  Patellar ligament    Patella  Fibular head  Pes Anserinus    Tibial tubercle  Hamstring tendons  Infrapatellar fat pad      Optional Tests:    Patellar Mobility   []L []R Hypermobile []L [x]R Hypomobile         Other tests/comments:  30 second sit to stand test: 3x and was limited by pain which decreased after a few minutes of rest       Pain Level (0-10 scale) post treatment:  4/10    ASSESSMENT/Changes in Function:      [x]  See Plan of Care  []  See progress note/recertification  []  See Discharge Summary         Progress towards goals / Updated goals:  See POC    PLAN  []  Upgrade activities as tolerated     [x]  Continue plan of care  []  Update interventions per flow sheet       []  Discharge due to:_  []  Other:_      Blanca Christopher, PT 11/11/2019  2:20 PM

## 2019-11-11 NOTE — PROGRESS NOTES
[facility] [employer] [facility]  [address] [place] [address]  [phone] fax    Plan of Care/ Statement of Necessity for Physical Therapy Services    Patient name: Shravan Castro Start of Care: 2019   Referral source: Carmelita Gamez : 1960    Medical Diagnosis: Presence of right artificial knee joint [Z96.651]  Other acute postprocedural pain [G89.18]  Unilateral primary osteoarthritis, right knee [M17.11]  Payor: BLUE CROSS / Plan: Treinta Y Edgar [id number] PPO / Product Type: PPO /  Onset Date: 10/23/19    Treatment Diagnosis:  Right knee pain   Prior Hospitalization: see medical history Provider#: [id number]   Medications: Verified on Patient summary List    Comorbidities: none reported   Prior Level of Function: Ind with ambulation, working as a       The [address] [place] [address] and following information is based on the information from the initial evaluation. Assessment/ key information:  Pt. Is a 61year old female s/p right TKA revision on 10/23/19. She reports having home health PT following her hospital stay. She uses a cane for community ambulation and no AD at home. She presents with decreased right knee AROM 4-103 degrees with pain and reports or tightness at end range. She has decreased right knee extension strength compared to left side and decreased flexibility. 30 second sit to stand test was 3x. Skilled PT is medically necessary in order to improve right knee mobility and strength for increased ease of ambulation and return to OF.      Evaluation Complexity History LOW Complexity : Zero comorbidities / personal factors that will impact the outcome / POC; Examination MEDIUM Complexity : 3 Standardized tests and measures addressing body structure, function, activity limitation and / or participation in recreation  ;Presentation LOW Complexity : Stable, uncomplicated  ;Clinical Decision Making MEDIUM Complexity : FOTO score of 26-74  Overall Complexity Rating: LOW   Problem List: pain affecting function, decrease ROM, decrease strength, impaired gait/ balance, decrease ADL/ functional abilitiies, decrease activity tolerance, decrease flexibility/ joint mobility and decrease transfer abilities   Treatment Plan may include any combination of the following: Therapeutic exercise, Therapeutic activities, Neuromuscular re-education, Physical agent/modality, Gait/balance training, Manual therapy, Patient education, Self Care training, Functional mobility training and Home safety training  Patient / Family readiness to learn indicated by: asking questions  Persons(s) to be included in education: patient (P)  Barriers to Learning/Limitations: None  Patient Goal (s): to straighten knee better when walking  Patient Self Reported Health Status: good  Rehabilitation Potential: good    Short Term Goals: To be accomplished in 1 weeks:  1. Patient will demonstrate compliance with HEP in order to improve right knee mobility for increased ease of ambulation. Long Term Goals: To be accomplished in 6 weeks:  1. Patient will improve FOTO score by 22 points in order to demonstrate a significant improvement in function. 2. Patient will improve right knee AROM 0-120 degrees in order to increase ease of ambulation. 3. Patient will improve right knee MMT to 5/5 in order to increase ease of working. 4. Patient will improve 30 second sit to stand test to 6x in order to increase ease of transfers at home. Frequency / Duration: Patient to be seen 2 times per week for 6 weeks. Patient/ CarPatient/ Caregiver education and instruction: Diagnosis, prognosis, exercises   [x]  Plan of care has been reviewed with PTA Lucius Lanes, PT 11/11/2019 3:09 PM    ________________________________________________________________________    I certify that the above Therapy Services are being furnished while the patient is under my care.  I agree with the treatment plan and certify that this therapy is necessary.     Physician's Signature:____________Date:_________TIME:________    ** Signature, Date and Time must be completed for valid certification **    Please sign and return to [facility]  [address] [place] [address]  [phone] fax

## 2019-11-15 ENCOUNTER — HOSPITAL ENCOUNTER (OUTPATIENT)
Dept: PHYSICAL THERAPY | Age: 59
Discharge: HOME | End: 2019-11-15
Payer: COMMERCIAL

## 2019-11-15 PROCEDURE — 97110 THERAPEUTIC EXERCISES: CPT

## 2019-11-15 PROCEDURE — 97016 VASOPNEUMATIC DEVICE THERAPY: CPT

## 2019-11-15 NOTE — PROGRESS NOTES
PT DAILY TREATMENT NOTE 10-18    Patient Name: Zonia Bloch  Date:11/15/2019  : 1960  [x]  Patient  Verified  Payor: BLUE CROSS / Plan: Cynthia Hernández [id number] PPO / Product Type: PPO /    In time:730  Out time:817  Total Treatment Time (min): 52  Visit #: 2 of 12    Medicare/BCBS Only   Total Timed Codes (min):  32 1:1 Treatment Time:  32       Treatment Area: Pain in right knee [M25.561]    SUBJECTIVE  Pain Level (0-10 scale): 0/10  Any medication changes, allergies to medications, adverse drug reactions, diagnosis change, or new procedure performed?: [x] No    [] Yes (see summary sheet for update)  Subjective functional status/changes:   [] No changes reported  Pt stated that she has no pain, just soreness and tightness    OBJECTIVE    Modality rationale: decrease inflammation to improve the patients ability to increase ease with ADLs   Min Type Additional Details    [] Estim:  []Unatt       []IFC  []Premod                        []Other:  []w/ice   []w/heat  Position:  Location:    [] Estim: []Att    []TENS instruct  []NMES                    []Other:  []w/US   []w/ice   []w/heat  Position:  Location:    []  Traction: [] Cervical       []Lumbar                       [] Prone          []Supine                       []Intermittent   []Continuous Lbs:  [] before manual  [] after manual    []  Ultrasound: []Continuous   [] Pulsed                           []1MHz   []3MHz W/cm2:  Location:    []  Iontophoresis with dexamethasone         Location: [] Take home patch   [] In clinic    []  Ice     []  heat  []  Ice massage  []  Laser   []  Anodyne Position:  Location:    []  Laser with stim  []  Other:  Position:  Location:   15 [x]  Vasopneumatic Device Pressure:       [x] lo [] med [] hi   Temperature: [x] lo [] med [] hi   [x] Skin assessment post-treatment:  [x]intact []redness- no adverse reaction    []redness - adverse reaction:     32 min Therapeutic Exercise:  [x] See flow sheet :   Rationale: increase ROM and increase strength to improve the patients ability to increase ease with walking and performing ADLs    With   [x] TE   [] TA   [] neuro   [] other: Patient Education: [x] Review HEP    [] Progressed/Changed HEP based on:   [] positioning   [] body mechanics   [] transfers   [] heat/ice application    [] other:      Other Objective/Functional Measures:   Had really good form with SL hip abd  No complaint of pain during session  No LOB with static balance     Pain Level (0-10 scale) post treatment: 0/10    ASSESSMENT/Changes in Function:   Initiated therex today per flow sheet. Pt put forth good effort with all exercises. Pt reported compliance with HEP    Patient will continue to benefit from skilled PT services to modify and progress therapeutic interventions, address functional mobility deficits, address ROM deficits, address strength deficits, analyze and cue movement patterns, analyze and modify body mechanics/ergonomics, address imbalance/dizziness and instruct in home and community integration to attain remaining goals. [x]  See Plan of Care  []  See progress note/recertification  []  See Discharge Summary         Progress towards goals / Updated goals:  Short Term Goals: To be accomplished in 1 weeks:  1. Patient will demonstrate compliance with HEP in order to improve right knee mobility for increased ease of ambulation. Goal met. 11/15/19    Long Term Goals: To be accomplished in 6 weeks:  1. Patient will improve FOTO score by 22 points in order to demonstrate a significant improvement in function. 2. Patient will improve right knee AROM 0-120 degrees in order to increase ease of ambulation. 3. Patient will improve right knee MMT to 5/5 in order to increase ease of working. 4. Patient will improve 30 second sit to stand test to 6x in order to increase ease of transfers at home.      PLAN  []  Upgrade activities as tolerated     [x]  Continue plan of care  []  Update interventions per flow sheet       []  Discharge due to:_  []  Other:_      Roz Philomena, PTA 11/15/2019  7:34 AM    Future Appointments   Date Time Provider Jean Paul Missy   11/19/2019  7:30 AM Perla Lout, PTA [facility] [address]   11/21/2019  8:00 AM Buddy Jules, PT [facility] [address]   11/27/2019 11:00 AM Romulo Castillo PA-C [facility]   12/3/2019  7:30 AM Perla Lout, PTA [facility] [address]   12/5/2019  8:00 AM Buddy Jules, PT [facility] [address]   12/10/2019  7:30 AM Perla Lout, PTA [facility] [address]   12/12/2019  7:30 AM Perla Lout, PTA [facility] [address]   12/17/2019  7:30 AM Perla Lout, PTA [facility] [address]   12/19/2019  8:30 AM Buddy Jules, PT [id number] [address]   12/23/2019  8:00 AM Perla Lout, PTA [facility] [address]   12/26/2019  8:30 AM Buddy Jules, PT [facility] [address]   12/30/2019  7:30 AM Perla Lout, PTA [facility] [address]   1/2/2020  7:30 AM Perla Lout, PTA [facility] [address]

## 2019-11-19 ENCOUNTER — HOSPITAL ENCOUNTER (OUTPATIENT)
Dept: PHYSICAL THERAPY | Age: 59
Discharge: HOME | End: 2019-11-19
Payer: COMMERCIAL

## 2019-11-19 PROCEDURE — 97016 VASOPNEUMATIC DEVICE THERAPY: CPT

## 2019-11-19 PROCEDURE — 97110 THERAPEUTIC EXERCISES: CPT

## 2019-11-19 NOTE — PROGRESS NOTES
PT DAILY TREATMENT NOTE 10-18    Patient Name: Tonja Smith  Date:2019  : 1960  [x]  Patient  Verified  Payor: BLUE CROSS / Plan: [facility] PPO / Product Type: PPO /    In time:730  Out time:815  Total Treatment Time (min): 45  Visit #: 3 of 12    Medicare/BCBS Only   Total Timed Codes (min):  30 1:1 Treatment Time:  30       Treatment Area: Pain in right knee [M25.561]    SUBJECTIVE  Pain Level (0-10 scale): 0/10  Any medication changes, allergies to medications, adverse drug reactions, diagnosis change, or new procedure performed?: [x] No    [] Yes (see summary sheet for update)  Subjective functional status/changes:   [] No changes reported  Pt stated that she mainly has stiffness and soreness today    OBJECTIVE    Modality rationale: decrease inflammation and decrease pain to improve the patients ability to increase ease with ADLs   Min Type Additional Details    [] Estim:  []Unatt       []IFC  []Premod                        []Other:  []w/ice   []w/heat  Position:  Location:    [] Estim: []Att    []TENS instruct  []NMES                    []Other:  []w/US   []w/ice   []w/heat  Position:  Location:    []  Traction: [] Cervical       []Lumbar                       [] Prone          []Supine                       []Intermittent   []Continuous Lbs:  [] before manual  [] after manual    []  Ultrasound: []Continuous   [] Pulsed                           []1MHz   []3MHz W/cm2:  Location:    []  Iontophoresis with dexamethasone         Location: [] Take home patch   [] In clinic    []  Ice     []  heat  []  Ice massage  []  Laser   []  Anodyne Position:  Location:    []  Laser with stim  []  Other:  Position:  Location:   15 [x]  Vasopneumatic Device Pressure:       [x] lo [] med [] hi   Temperature: [x] lo [] med [] hi   [x] Skin assessment post-treatment:  [x]intact []redness- no adverse reaction    []redness - adverse reaction:     30 min Therapeutic Exercise:  [x] See flow sheet : Rationale: increase ROM and increase strength to improve the patients ability to increase ease with ADLs    With   [x] TE   [] TA   [] neuro   [] other: Patient Education: [x] Review HEP    [] Progressed/Changed HEP based on:   [] positioning   [] body mechanics   [] transfers   [] heat/ice application    [] other:      Other Objective/Functional Measures:   Had some difficulty with extending right LE after knee has been in flexed  SLR are challenging  Tolerated increases made today without difficulty     Pain Level (0-10 scale) post treatment: 3/10    ASSESSMENT/Changes in Function:   Pt is progressing fairly well toward goals. Strength and range of motion are improving. Pt cont to ambulate with SPC and has moderate limp. Sit to stands are improving    Patient will continue to benefit from skilled PT services to modify and progress therapeutic interventions, address functional mobility deficits, address ROM deficits, address strength deficits, analyze and cue movement patterns, analyze and modify body mechanics/ergonomics and instruct in home and community integration to attain remaining goals. [x]  See Plan of Care  []  See progress note/recertification  []  See Discharge Summary         Progress towards goals / Updated goals:  Short Term Goals: To be accomplished in 1 weeks:  1. Patient will demonstrate compliance with HEP in order to improve right knee mobility for increased ease of ambulation.   Goal met. 11/15/19     Long Term Goals: To be accomplished in 6 weeks:  1. Patient will improve FOTO score by 22 points in order to demonstrate a significant improvement in function. 2. Patient will improve right knee AROM 0-120 degrees in order to increase ease of ambulation. 3. Patient will improve right knee MMT to 5/5 in order to increase ease of working.    4. Patient will improve 30 second sit to stand test to 6x in order to increase ease of transfers at home.     PLAN  []  Upgrade activities as tolerated     [x]  Continue plan of care  []  Update interventions per flow sheet       []  Discharge due to:_  []  Other:_      Nora Tracey, PTA 11/19/2019  7:25 AM    Future Appointments   Date Time Provider Jean Paul Missy   11/19/2019  7:30 AM Keith Borjas, PTA [facility]   11/21/2019  8:00 AM Asha Salmeron, PT [facility]   11/27/2019 11:00 AM Luanne Chew PA-C [facility]   12/3/2019  7:30 AM Keith Borjas, PTA [facility]   12/5/2019  8:00 AM Asha Salmeron, PT [facility]   12/10/2019  7:30 AM Keith Borjas, PTA [facility]   12/12/2019  7:30 AM Keith Borjas, PTA [facility]   12/17/2019  7:30 AM Keith Borjas, PTA [facility]   12/19/2019  8:30 AM Asha Salmeron, PT [id number] [facility]   12/23/2019  8:00 AM Keith Borjas, PTA [facility]   12/26/2019  8:30 AM Asha Salmeron, PT [facility]   12/30/2019  7:30 AM Keith Borjas, PTA [facility]   1/2/2020  7:30 AM Keith Borjas, PTA [facility]

## 2019-11-21 ENCOUNTER — HOSPITAL ENCOUNTER (OUTPATIENT)
Dept: PHYSICAL THERAPY | Age: 59
Discharge: HOME | End: 2019-11-21
Payer: COMMERCIAL

## 2019-11-21 PROCEDURE — 97116 GAIT TRAINING THERAPY: CPT

## 2019-11-21 PROCEDURE — 97110 THERAPEUTIC EXERCISES: CPT

## 2019-11-21 PROCEDURE — 97016 VASOPNEUMATIC DEVICE THERAPY: CPT

## 2019-11-21 NOTE — PROGRESS NOTES
PT DAILY TREATMENT NOTE 10-18    Patient Name: Elizabeth Kim  Date:2019  : 1960  [x]  Patient  Verified  Payor: BLUE CROSS / Plan: [facility] PPO / Product Type: PPO /    In time: 8:00  Out time: 8:47  Total Treatment Time (min): 37  Visit #: 4 of 12    Medicare/BCBS Only   Total Timed Codes (min):  37 1:1 Treatment Time:  35       Treatment Area: Pain in right knee [M25.561]    SUBJECTIVE  Pain Level (0-10 scale):  5/10  Any medication changes, allergies to medications, adverse drug reactions, diagnosis change, or new procedure performed?: [x] No    [] Yes (see summary sheet for update)  Subjective functional status/changes:   [] No changes reported  Pt. Reports she is doing pretty good but continues to have some tightness and soreness on the sides of her knee.      OBJECTIVE    Modality rationale: decrease edema, decrease inflammation and decrease pain to improve the patients ability to increase ease of ADLs   Min Type Additional Details    [] Estim:  []Unatt       []IFC  []Premod                        []Other:  []w/ice   []w/heat  Position:  Location:    [] Estim: []Att    []TENS instruct  []NMES                    []Other:  []w/US   []w/ice   []w/heat  Position:  Location:    []  Traction: [] Cervical       []Lumbar                       [] Prone          []Supine                       []Intermittent   []Continuous Lbs:  [] before manual  [] after manual    []  Ultrasound: []Continuous   [] Pulsed                           []1MHz   []3MHz W/cm2:  Location:    []  Iontophoresis with dexamethasone         Location: [] Take home patch   [] In clinic    []  Ice     []  heat  []  Ice massage  []  Laser   []  Anodyne Position:  Location:    []  Laser with stim  []  Other:  Position:  Location:   10 [x]  Vasopneumatic Device Pressure:       [x] lo [] med [] hi   Temperature: [x] lo [] med [] hi   [x] Skin assessment post-treatment:  [x]intact []redness- no adverse reaction    []redness - adverse reaction:     29 min Therapeutic Exercise:  [x] See flow sheet :   Rationale: increase ROM and increase strength to improve the patients ability to increase ease of ambulation     8 min: gait train: fwd and backward with mirror 30 feet 3x with cane and 3x without cane with SBA. Rationale: improve step length and weight shift to right side during ambulation            With   [x] TE   [] TA   [] neuro   [] other: Patient Education: [x] Review HEP    [] Progressed/Changed HEP based on:   [] positioning   [] body mechanics   [] transfers   [] heat/ice application    [] other:      Other Objective/Functional Measures:   Right knee AROM: 7-106 degrees  Continues to have good patella mobility   Pt. Was educated on importance of knee extension during HEP and was educated on prone leg hang  Pt. Has improved weight shift to right side during ambulation with visual and verbal cues    Pain Level (0-10 scale) post treatment:  3/10    ASSESSMENT/Changes in Function:  Pt. Is making limited progress towards goals. She continues to have significant limitation in extension which is affecting her ambulation. Patient will continue to benefit from skilled PT services to modify and progress therapeutic interventions, address functional mobility deficits, address ROM deficits, address strength deficits, analyze and address soft tissue restrictions, analyze and cue movement patterns and analyze and modify body mechanics/ergonomics to attain remaining goals. Progress towards goals / Updated goals:  Short Term Goals: To be accomplished in 1 weeks:  1. Patient will demonstrate compliance with HEP in order to improve right knee mobility for increased ease of ambulation.   Goal met. 11/15/19     Long Term Goals: To be accomplished in 6 weeks:  1. Patient will improve FOTO score by 22 points in order to demonstrate a significant improvement in function.    2. Patient will improve right knee AROM 0-120 degrees in order to increase ease of ambulation. Not met: 7-106 degrees (11/21/19)  3. Patient will improve right knee MMT to 5/5 in order to increase ease of working.    4. Patient will improve 30 second sit to stand test to 6x in order to increase ease of transfers at home.     PLAN  []  Upgrade activities as tolerated     [x]  Continue plan of care  []  Update interventions per flow sheet       []  Discharge due to:_  []  Other:_      Lucius Lanes, PT 11/21/2019  7:36 AM    Future Appointments   Date Time Provider Jean Paul Louis   11/21/2019  8:00 AM Baudilio [place], PT [facility]   11/27/2019 11:00 AM Zainab Christensen PA-C [facility]   12/3/2019  7:30 AM Soto Romero, PTA GUKCFRI [facility]   12/5/2019  8:00 AM Soto Romero, PTA KBUELHF [facility]   12/10/2019  7:30 AM Soto Romero, PTA [facility]   12/12/2019  7:30 AM Soto Romero, PTA [facility]   12/17/2019  7:30 AM Soto Romero, PTA [facility]   12/19/2019  8:30 AM Baudilio [place], PT [id number] [facility]   12/23/2019  8:00 AM Soto Romero, PTA [facility]   12/26/2019  8:30 AM Baudilio [place], PT [id number] [facility]   12/30/2019  7:30 AM Soto Romero, PTA [facility]   1/2/2020  7:30 AM Soto Romero, PTA [facility]

## 2019-11-27 ENCOUNTER — OFFICE VISIT (OUTPATIENT)
Dept: ORTHOPEDIC SURGERY | Facility: CLINIC | Age: 59
End: 2019-11-27

## 2019-11-27 VITALS
BODY MASS INDEX: 29.89 KG/M2 | SYSTOLIC BLOOD PRESSURE: 114 MMHG | HEIGHT: 66 IN | RESPIRATION RATE: 16 BRPM | TEMPERATURE: 97.2 F | DIASTOLIC BLOOD PRESSURE: 56 MMHG | WEIGHT: 186 LBS | HEART RATE: 71 BPM

## 2019-11-27 DIAGNOSIS — Z96.651 H/O TOTAL KNEE REPLACEMENT, RIGHT: ICD-10-CM

## 2019-11-27 DIAGNOSIS — M25.561 RIGHT KNEE PAIN, UNSPECIFIED CHRONICITY: Primary | ICD-10-CM

## 2019-11-27 RX ORDER — HYDROCODONE BITARTRATE AND ACETAMINOPHEN 7.5; 325 MG/1; MG/1
1-2 TABLET ORAL
Qty: 42 TAB | Refills: 0 | Status: SHIPPED | OUTPATIENT
Start: 2019-11-27 | End: 2019-12-04

## 2019-11-27 NOTE — PROGRESS NOTES
1. Have you been to the ER, urgent care clinic since your last visit? Hospitalized since your last visit? NO    2. Have you seen or consulted any other health care providers outside of the [address] [place] [address] since your last visit? Include any pap smears or colon screening.    No

## 2019-11-27 NOTE — LETTER
NOTIFICATION RETURN TO WORK  
 
11/27/2019 11:19 AM 
 
Ms. Chrissie Geiger [employer] [phone] To Whom It May Concern: 
 
Chrissie Cobos is currently under the care of [address] [place] [address]. She is expected return to work on: Friday January 10, 2020. If there are questions or concerns please have the patient contact our office.  
 
 
 
Sincerely, 
 
 
Shruthi Curry PA-C

## 2019-11-27 NOTE — PROGRESS NOTES
[address] [place] [address]  [phone]           Patient: Rg Lamar                MRN: [id number]       SSN: [id number]  YOB: 1960        AGE: 61 y.o. SEX: female  Body mass index is 30.02 kg/m². PCP: Indio Gallego MD  11/27/19      This office note has been dictated. REVIEW OF SYSTEMS:  Constitutional: Negative for fever, chills, weight loss and malaise/fatigue. HENT: Negative. Eyes: Negative. Respiratory: Negative. Cardiovascular: Negative. Gastrointestinal: No bowel incontinence or constipation. Genitourinary: No bladder incontinence or saddle anesthesia. Skin: Negative. Neurological: Negative. Endo/Heme/Allergies: Negative. Psychiatric/Behavioral: Negative. Musculoskeletal: As per HPI above. Past Medical History:   Diagnosis Date    Arthritis     Elevated cholesterol     no meds     GERD (gastroesophageal reflux disease)     Hypertension          Current Outpatient Medications:     oxyCODONE-acetaminophen (PERCOCET 7.5) 7.5-325 mg per tablet, Take 1 Tab by mouth every six (6) hours as needed for Pain. take 1-2 tabs wvry 6 hours as needed, Disp: , Rfl:     aspirin delayed-release 325 mg tablet, Take 1 Tab by mouth two (2) times a day., Disp: 60 Tab, Rfl: 1    celecoxib (CELEBREX) 200 mg capsule, Take 1 Cap by mouth two (2) times a day for 90 days. , Disp: 60 Cap, Rfl: 2    ferrous sulfate 325 mg (65 mg iron) tablet, Take 1 Tab by mouth two (2) times daily (with meals). , Disp: 60 Tab, Rfl: 1    docusate sodium (COLACE) 100 mg capsule, Take 1 Cap by mouth two (2) times a day for 90 days. , Disp: 60 Cap, Rfl: 2    VITAMIN D2 50,000 unit capsule, take 1 capsule by mouth every week, Disp: , Rfl: 0    hydroCHLOROthiazide (HYDRODIURIL) 25 mg tablet, take 1 tablet by mouth once daily, Disp: , Rfl: 0    Omeprazole delayed release (PRILOSEC D/R) 20 mg tablet, take 1 tablet by mouth once daily, Disp: , Rfl: 0    No Known Allergies    Social History     Socioeconomic History    Marital status: LEGALLY      Spouse name: Not on file    Number of children: Not on file    Years of education: Not on file    Highest education level: Not on file   Occupational History    Not on file   Social Needs    Financial resource strain: Not on file    Food insecurity:     Worry: Not on file     Inability: Not on file    Transportation needs:     Medical: Not on file     Non-medical: Not on file   Tobacco Use    Smoking status: Never Smoker    Smokeless tobacco: Never Used   Substance and Sexual Activity    Alcohol use: No    Drug use: No    Sexual activity: Not on file   Lifestyle    Physical activity:     Days per week: Not on file     Minutes per session: Not on file    Stress: Not on file   Relationships    Social connections:     Talks on phone: Not on file     Gets together: Not on file     Attends [employer] service: Not on file     Active member of club or organization: Not on file     Attends meetings of clubs or organizations: Not on file     Relationship status: Not on file    Intimate partner violence:     Fear of current or ex partner: Not on file     Emotionally abused: Not on file     Physically abused: Not on file     Forced sexual activity: Not on file   Other Topics Concern    Not on file   Social History Narrative    Not on file       Past Surgical History:   Procedure Laterality Date    HX  SECTION      x 4    HX KNEE ARTHROSCOPY Right     HX KNEE REPLACEMENT Right 2018    HX TUBAL LIGATION               We did see Ms. Debora Jimenez for followup with regards to revision right knee replacement. The patient is now about five weeks status post surgery, and she is doing quite well. The knee is just a little sore for her still. She does continue physical therapy without complications.   She states the Percocet was too strong and would like to try something different. PHYSICAL EXAMINATION:  In general, the patient is alert and oriented x 3 in no acute distress. The patient is well-developed, well-nourished, with a normal affect. The patient is afebrile. Examination of the right knee reveals the skin is intact. The surgical wound is healed nicely. There is no ecchymosis, no warmth, and no signs of infection or cellulitis present. She has full extension. She flexes to 115ø. The patella tracks nicely. There are no rubs or crepitus noted. RADIOGRAPHS:  Radiographs in the office today, including AP, lateral, and skyline of the right knee, show the total knee components to be well-fixed. No evidence of loosening or fracture is noted. ASSESSMENT:  Status post revision right knee replacement. PLAN:  At this point, the patient overall is doing quite well. We are going to try some Norco for her pain. She will continue physical therapy. She was given a note to be expected to be out of work until approximately January 10, 2020. She will see us back in about three or four weeks' time for evaluation. She will call with any questions or concerns that shall arise.                   JR Charles GARCIA, NELSON, ATC

## 2019-12-03 ENCOUNTER — HOSPITAL ENCOUNTER (OUTPATIENT)
Dept: PHYSICAL THERAPY | Age: 59
Discharge: HOME | End: 2019-12-03
Payer: COMMERCIAL

## 2019-12-03 PROCEDURE — 97016 VASOPNEUMATIC DEVICE THERAPY: CPT

## 2019-12-03 PROCEDURE — 97110 THERAPEUTIC EXERCISES: CPT

## 2019-12-03 NOTE — PROGRESS NOTES
PT DAILY TREATMENT NOTE 10-18    Patient Name: Sunitha Thomas  Date:12/3/2019  : 1960  [x]  Patient  Verified  Payor: BLUE CROSS / Plan: [facility] PPO / Product Type: PPO /    In time:730  Out time:815  Total Treatment Time (min): 45  Visit #: 5 of 12    Medicare/BCBS Only   Total Timed Codes (min):  30 1:1 Treatment Time:  30       Treatment Area: Pain in right knee [M25.561]    SUBJECTIVE  Pain Level (0-10 scale): 5/10  Any medication changes, allergies to medications, adverse drug reactions, diagnosis change, or new procedure performed?: [x] No    [] Yes (see summary sheet for update)  Subjective functional status/changes:   [] No changes reported  Pt stated that she cont with stiffness and soreness    OBJECTIVE    Modality rationale: decrease inflammation and decrease pain to improve the patients ability to increase ease with ADLs   Min Type Additional Details    [] Estim:  []Unatt       []IFC  []Premod                        []Other:  []w/ice   []w/heat  Position:  Location:    [] Estim: []Att    []TENS instruct  []NMES                    []Other:  []w/US   []w/ice   []w/heat  Position:  Location:    []  Traction: [] Cervical       []Lumbar                       [] Prone          []Supine                       []Intermittent   []Continuous Lbs:  [] before manual  [] after manual    []  Ultrasound: []Continuous   [] Pulsed                           []1MHz   []3MHz W/cm2:  Location:    []  Iontophoresis with dexamethasone         Location: [] Take home patch   [] In clinic    []  Ice     []  heat  []  Ice massage  []  Laser   []  Anodyne Position:  Location:    []  Laser with stim  []  Other:  Position:  Location:   15 [x]  Vasopneumatic Device Pressure:       [x] lo [] med [] hi   Temperature: [x] lo [] med [] hi   [x] Skin assessment post-treatment:  [x]intact []redness- no adverse reaction    []redness - adverse reaction:     30 min Therapeutic Exercise:  [x] See flow sheet : Rationale: increase ROM and increase strength to improve the patients ability to increase ease with ADls    With   [x] TE   [] TA   [] neuro   [] other: Patient Education: [x] Review HEP    [] Progressed/Changed HEP based on:   [] positioning   [] body mechanics   [] transfers   [] heat/ice application    [] other:      Other Objective/Functional Measures:   Had no difficulty with exercises  Step ups cont to be challenging  Sit to stands were challenging with right foot back     Pain Level (0-10 scale) post treatment: 4/10    ASSESSMENT/Changes in Function:   Pt is slowly progressing toward goals. Pt cont with decreased range of motion and strength in the right knee. Cont to have slight limp, but can correct if cued. Pt cont to ambulate with [facility]    Patient will continue to benefit from skilled PT services to modify and progress therapeutic interventions, address functional mobility deficits, address ROM deficits, address strength deficits, analyze and cue movement patterns, analyze and modify body mechanics/ergonomics, assess and modify postural abnormalities and instruct in home and community integration to attain remaining goals. [x]  See Plan of Care  []  See progress note/recertification  []  See Discharge Summary         Progress towards goals / Updated goals:  Short Term Goals: To be accomplished in 1 weeks:  1. Patient will demonstrate compliance with HEP in order to improve right knee mobility for increased ease of ambulation.   Goal met. 11/15/19     Long Term Goals: To be accomplished in 6 weeks:  1. Patient will improve FOTO score by 22 points in order to demonstrate a significant improvement in function. 2. Patient will improve right knee AROM 0-120 degrees in order to increase ease of ambulation. Not met: 7-106 degrees (11/21/19)  3. Patient will improve right knee MMT to 5/5 in order to increase ease of working.    4. Patient will improve 30 second sit to stand test to 6x in order to increase ease of transfers at home.     PLAN  []  Upgrade activities as tolerated     [x]  Continue plan of care  []  Update interventions per flow sheet       []  Discharge due to:_  []  Other:_      Sade Ram, NEHA 12/3/2019  7:28 AM    Future Appointments   Date Time Provider Jean Paul Louis   12/3/2019  7:30 AM Krystal Reyna, PTA [facility]   12/5/2019  8:00 AM Krystal Reyna, PTA [facility]   12/10/2019  7:30 AM Krystal Reyna, PTA [facility]   12/12/2019  7:30 AM Krystal Reyna, PTA [facility]   12/17/2019  7:30 AM Krystal Reyna, PTA [facility]   12/19/2019  8:30 AM Jeanette Mckeon, PT [facility]   12/23/2019  8:00 AM Krystal Reyna, PTA [facility]   12/26/2019  8:30 AM Jeanette Mckeon, PT [facility]   12/26/2019  9:55 AM Calvin James PA-C [facility]   12/30/2019  7:30 AM Krystal Reyna, PTA [facility]   1/2/2020  7:30 AM Krystal Reyna, PTA [facility]

## 2019-12-05 ENCOUNTER — HOSPITAL ENCOUNTER (OUTPATIENT)
Dept: PHYSICAL THERAPY | Age: 59
Discharge: HOME | End: 2019-12-05
Payer: COMMERCIAL

## 2019-12-05 PROCEDURE — 97110 THERAPEUTIC EXERCISES: CPT

## 2019-12-05 NOTE — PROGRESS NOTES
PT DAILY TREATMENT NOTE 10-18    Patient Name: Patricia Mike  Date:2019  : 1960  [x]  Patient  Verified  Payor: BLUE CROSS / Plan: [id number] PPO / Product Type: PPO /    In time:800  Out time:825  Total Treatment Time (min): 25  Visit #: 6 of 12    Medicare/BCBS Only   Total Timed Codes (min):  25 1:1 Treatment Time:  25       Treatment Area: Pain in right knee [M25.561]    SUBJECTIVE  Pain Level (0-10 scale): 3/10  Any medication changes, allergies to medications, adverse drug reactions, diagnosis change, or new procedure performed?: [x] No    [] Yes (see summary sheet for update)  Subjective functional status/changes:   [] No changes reported  Pt stated that she has less pain today    OBJECTIVE      25 min Therapeutic Exercise:  [x] See flow sheet :   Rationale: increase ROM and increase strength to improve the patients ability to increase ease with ADLs    With   [x] TE   [] TA   [] neuro   [] other: Patient Education: [x] Review HEP    [] Progressed/Changed HEP based on:   [] positioning   [] body mechanics   [] transfers   [] heat/ice application    [] other:      Other Objective/Functional Measures:   Had no difficulty with exercises  No complaint of increased pain during session   Pt declined CP   Pt stated that the heel slides did not hurt today    Pain Level (0-10 scale) post treatment: 2/10    ASSESSMENT/Changes in Function:   Pt is progressing well toward goals. Strength and range of motion in the right knee cont to improve. Pt cont with minimal limp on the right with ambulation. Pt was able to perform 12 sit to stands in 30\".      Patient will continue to benefit from skilled PT services to modify and progress therapeutic interventions, address functional mobility deficits, address ROM deficits, address strength deficits, analyze and cue movement patterns, analyze and modify body mechanics/ergonomics and instruct in home and community integration to attain remaining goals. [x]  See Plan of Care  []  See progress note/recertification  []  See Discharge Summary         Progress towards goals / Updated goals:  Short Term Goals: To be accomplished in 1 weeks:  1. Patient will demonstrate compliance with HEP in order to improve right knee mobility for increased ease of ambulation.   Goal met. 11/15/19     Long Term Goals: To be accomplished in 6 weeks:  1. Patient will improve FOTO score by 22 points in order to demonstrate a significant improvement in function. 2. Patient will improve right knee AROM 0-120 degrees in order to increase ease of ambulation.   Not met: 7-106 degrees (11/21/19)  3. Patient will improve right knee MMT to 5/5 in order to increase ease of working. 4. Patient will improve 30 second sit to stand test to 6x in order to increase ease of transfers at home.   Goal met. Pt was able to perform 12 sit to stands in 30 seconds.  12/5/19    PLAN  []  Upgrade activities as tolerated     [x]  Continue plan of care  []  Update interventions per flow sheet       []  Discharge due to:_  []  Other:_      Hansel Christiansen PTA 12/5/2019  7:50 AM    Future Appointments   Date Time Provider Jean Paul Louis   12/5/2019  8:00 AM Bhvain Huston PTA [facility]   12/10/2019  7:30 AM Bhavin Huston, NEHA [facility]   12/12/2019  7:30 AM Bhavin Huston, PTA [facility]   12/17/2019  7:30 AM Bhavin Huston, NEHA [facility]   12/19/2019  8:30 AM Zarina Favors, PT [facility]   12/23/2019  8:00 AM Bhavin Huston, NEHA [facility]   12/26/2019  8:30 AM Zarina Favors, PT [facility]   12/26/2019  9:55 AM Katia Hoffman PA-C [facility]   12/30/2019  7:30 AM Bhavin Huston PTA [facility]   1/2/2020  7:30 AM Bhavin Huston PTA [facility]

## 2019-12-10 ENCOUNTER — HOSPITAL ENCOUNTER (OUTPATIENT)
Dept: PHYSICAL THERAPY | Age: 59
Discharge: HOME | End: 2019-12-10
Payer: COMMERCIAL

## 2019-12-10 PROCEDURE — 97110 THERAPEUTIC EXERCISES: CPT

## 2019-12-10 NOTE — PROGRESS NOTES
PT DAILY TREATMENT NOTE 10-18    Patient Name: Sweta Floyd  Date:12/10/2019  : 1960  [x]  Patient  Verified  Payor: [employer]UE [employer] / Plan: [employer] [id number] PPO / Product Type: PPO /    In time:730  Out time:758  Total Treatment Time (min): 28  Visit #: 7 of 12    Medicare/BCBS Only   Total Timed Codes (min):  28 1:1 Treatment Time:  28       Treatment Area: Pain in right knee [M25.561]    SUBJECTIVE  Pain Level (0-10 scale): 210  Any medication changes, allergies to medications, adverse drug reactions, diagnosis change, or new procedure performed?: [x] No    [] Yes (see summary sheet for update)  Subjective functional status/changes:   [] No changes reported  Pt stated that she has been waling without the cane and is doing well with that    OBJECTIVE    28 min Therapeutic Exercise:  [x] See flow sheet :   Rationale: increase ROM and increase strength to improve the patients ability to increase ease with ADLs    With   [x] TE   [] TA   [] neuro   [] other: Patient Education: [x] Review HEP    [] Progressed/Changed HEP based on:   [] positioning   [] body mechanics   [] transfers   [] heat/ice application    [] other:      Other Objective/Functional Measures:   FOTO 57  Range of motion right knee -2-110*   Strength in right knee for flex is 4+/5 and ext is 5/5    Pain Level (0-10 scale) post treatment: 210    ASSESSMENT/Changes in Function:   See progress note    Patient will continue to benefit from skilled PT services to modify and progress therapeutic interventions, address functional mobility deficits, address ROM deficits, address strength deficits, analyze and cue movement patterns, analyze and modify body mechanics/ergonomics and instruct in home and community integration to attain remaining goals.      [x]  See Plan of Care  []  See progress note/recertification  []  See Discharge Summary         Progress towards goals / Updated goals:  Short Term Goals: To be accomplished in 1 weeks:  1. Patient will demonstrate compliance with HEP in order to improve right knee mobility for increased ease of ambulation.   Goal met. 11/15/19     Long Term Goals: To be accomplished in 6 weeks:  1. Patient will improve FOTO score by 22 points in order to demonstrate a significant improvement in function. Progressing. Increased from 39 to 57. 12/10/19  2. Patient will improve right knee AROM 0-120 degrees in order to increase ease of ambulation.   Progressing. -2-  3. Patient will improve right knee MMT to 5/5 in order to increase ease of working. Progressing. Right knee strength for extension is 5/5 and flexion is 4+/5. 12/10/19  4. Patient will improve 30 second sit to stand test to 6x in order to increase ease of transfers at home.   Goal met. Pt was able to perform 12 sit to stands in 30 seconds.  12/5/19    PLAN  []  Upgrade activities as tolerated     [x]  Continue plan of care  []  Update interventions per flow sheet       []  Discharge due to:_  []  Other:_      Andres Pope PTA 12/10/2019  7:20 AM    Future Appointments   Date Time Provider Jean Paul Louis   12/10/2019  7:30 AM Shai Pronto, PTA [facility]   12/12/2019  7:30 AM Shai Pronto, PTA [facility]   12/17/2019  7:30 AM Shai Pronto, PTA [facility]   12/19/2019  8:30 AM Laura Martinez PT [facility]   12/23/2019  8:00 AM Shai Pronto, PTA [facility]   12/26/2019  8:30 AM Laura Martinez PT [id number]   12/26/2019  9:55 AM Carli Cassidy PA-C [facility]   12/30/2019  7:30 AM Shai Pronto, PTA [facility]   1/2/2020  7:30 AM Shai Pronto, PTA [facility]

## 2019-12-10 NOTE — PROGRESS NOTES
[facility] [place] [facility] [employer] [facility] [employer]  KATHERINE  [address] [place] [address]  [phone] fax    Physical Therapy Progress Note  Patient name: Rg Lamar Start of Care: 2019   Referral source: Bard Domínguez : 1960                Medical Diagnosis: Presence of right artificial knee joint [Z96.651]  Other acute postprocedural pain [G89.18]  Unilateral primary osteoarthritis, right knee [M17.11]  Payor: BLUE CROSS / Plan: [facility] PPO / Product Type: PPO /  Onset Date: 10/23/19                Treatment Diagnosis:  Right knee pain   Prior Hospitalization: see medical history Provider#: [id number]   Medications: Verified on Patient summary List    Comorbidities: none reported   Prior Level of Function: Ind with ambulation, working as a                  Visits from [facility]: 7    Missed Visits: 0    Established Goals:         Excellent           Good         Limited           None  [x] Increased ROM   []  [x]  []  []  [x] Increased Strength  [x]  []  []  []  [x] Increased Mobility  []  [x]  []  []   [x] Decreased Pain   []  [x]  []  []  [] Decreased Swelling  []  []  []  []    Key Functional Changes:   FOTO 57  Range of motion right knee -2-110*              Strength in right knee for flex is 4+/5 and ext is 5/5    Updated Goals: to be achieved in 4 weeks:   1. Patient will improve FOTO score by 22 points in order to demonstrate a significant improvement in function. 2. Patient will improve right knee AROM 0-120 degrees in order to increase ease of ambulation.   3. Patient will improve right knee flexion MMT to 5/5 in order to increase ease of working. 4. Patient with ambulate without AD and will have no limp on the right. ASSESSMENT/RECOMMENDATIONS:  Patient is progressing well with therapy. Strength in the right knee has improved. Patient continues with decreased range of motion for flexion and extension, but is improving. Patient continues with mild pain in the right knee with minimal swelling. Patient continues to use [facility] on occasion. Patient continues with slight limp due to decreased range of motion. Patient will continue to benefit from skilled PT services to modify and progress therapeutic interventions, address functional mobility deficits, address ROM deficits, address strength deficits, analyze and cue movement patterns, analyze and modify body mechanics/ergonomics and instruct in home and community integration to attain remaining goals. [x]Continue therapy per initial plan/protocol at a frequency of  2 x per week for 4 weeks  []Continue therapy with the following recommended changes:_____________________      _____________________________________________________________________  []Discontinue therapy progressing towards or have reached established goals  []Discontinue therapy due to lack of appreciable progress towards goals  []Discontinue therapy due to lack of attendance or compliance  []Await Physician's recommendations/decisions regarding therapy  []Other:________________________________________________________________    Thank you for this referral.   Afia Myers, PTA 12/10/2019 12:02 PM    NOTE TO PHYSICIAN:  [address]   FAX TO [facility]: [phone]  If you are unable to process this request in 24 hours please contact our office: [phone] I have read the above report and request that my patient continue as recommended. ? I have read the above report and request that my patient continue therapy with the following changes/special instructions:____________________________________  ? I have read the above report and request that my patient be discharged from therapy.     Physicians signature: ______________________________Date: ______Time:______

## 2019-12-12 ENCOUNTER — HOSPITAL ENCOUNTER (OUTPATIENT)
Dept: PHYSICAL THERAPY | Age: 59
Discharge: HOME | End: 2019-12-12
Payer: COMMERCIAL

## 2019-12-12 PROCEDURE — 97110 THERAPEUTIC EXERCISES: CPT

## 2019-12-12 NOTE — PROGRESS NOTES
PT DAILY TREATMENT NOTE 10-18    Patient Name: Aaron Young  Date:2019  : 1960  [x]  Patient  Verified  Payor: BLUE CROSS / Plan: Cynthia Hernández [id number] PPO / Product Type: PPO /    In time:730  Out time:755  Total Treatment Time (min): 25  Visit #: 1 of 8    Medicare/BCBS Only   Total Timed Codes (min):  25 1:1 Treatment Time:  25       Treatment Area: Pain in right knee [M25.561]    SUBJECTIVE  Pain Level (0-10 scale): 2/10  Any medication changes, allergies to medications, adverse drug reactions, diagnosis change, or new procedure performed?: [x] No    [] Yes (see summary sheet for update)  Subjective functional status/changes:   [] No changes reported  Pt stated that she does not have real pain, just soreness    OBJECTIVE    25 min Therapeutic Exercise:  [x] See flow sheet :   Rationale: increase ROM and increase strength to improve the patients ability to increase ease with ADLs    With   [x] TE   [] TA   [] neuro   [] other: Patient Education: [x] Review HEP    [] Progressed/Changed HEP based on:   [] positioning   [] body mechanics   [] transfers   [] heat/ice application    [] other:      Other Objective/Functional Measures:   Had no difficulty with exercises  No complaint of increased pain during session   Pt declined CP     Pain Level (0-10 scale) post treatment: 0/10    ASSESSMENT/Changes in Function:   Pt is progressing well toward goals. Pt cont to have slight limp on the right, but is improving. Strength and range of motion in the right are slowly improving    Patient will continue to benefit from skilled PT services to modify and progress therapeutic interventions, address functional mobility deficits, address ROM deficits, address strength deficits, analyze and cue movement patterns, analyze and modify body mechanics/ergonomics, assess and modify postural abnormalities and instruct in home and community integration to attain remaining goals.      []  See Plan of Care  [x]  See progress note/recertification  []  See Discharge Summary         Progress towards goals / Updated goals:  1. Patient will improve FOTO score by 22 points in order to demonstrate a significant improvement in function.   2. Patient will improve right knee AROM 0-120 degrees in order to increase ease of ambulation.   3. Patient will improve right knee flexion MMT to 5/5 in order to increase ease of working. 4. Patient with ambulate without AD and will have no limp on the right.     PLAN  []  Upgrade activities as tolerated     [x]  Continue plan of care  []  Update interventions per flow sheet       []  Discharge due to:_  []  Other:_      Candace Wing PTA 12/12/2019  7:29 AM    Future Appointments   Date Time Provider Jean Paul Louis   12/12/2019  7:30 AM Firman Escort, PTA [facility]   12/17/2019  7:30 AM Firman Escort, PTA [facility]   12/19/2019  8:30 AM Kezia Baba, PT [facility]   12/23/2019  8:00 AM Firman Escort, PTA [id number] [facility]   12/26/2019  8:30 AM Kezia Baba, PT [id number] [facility]   12/26/2019  9:55 AM Chetan Watkins PA-C [facility]   12/30/2019  7:30 AM Firman Escort, PTA [facility]   1/2/2020  7:30 AM Firman Escort, PTA [facility]

## 2019-12-17 ENCOUNTER — HOSPITAL ENCOUNTER (OUTPATIENT)
Dept: PHYSICAL THERAPY | Age: 59
Discharge: HOME | End: 2019-12-17
Payer: COMMERCIAL

## 2019-12-17 PROCEDURE — 97110 THERAPEUTIC EXERCISES: CPT

## 2019-12-17 NOTE — PROGRESS NOTES
PT DAILY TREATMENT NOTE 10-18    Patient Name: Charlotte Brunner  Date:2019  : 1960  [x]  Patient  Verified  Payor: BLUE CROSS / Plan: Cynthia Hernández [id number] PPO / Product Type: PPO /    In time:730  Out time:800  Total Treatment Time (min): 30  Visit #: 2 of 8    Medicare/BCBS Only   Total Timed Codes (min):  30 1:1 Treatment Time:  30       Treatment Area: Pain in right knee [M25.561]    SUBJECTIVE  Pain Level (0-10 scale): 2/10  Any medication changes, allergies to medications, adverse drug reactions, diagnosis change, or new procedure performed?: [x] No    [] Yes (see summary sheet for update)  Subjective functional status/changes:   [] No changes reported  Pt stated that she has some increased soreness today    OBJECTIVE      30 min Therapeutic Exercise:  [x] See flow sheet :   Rationale: increase ROM and increase strength to improve the patients ability to increase ease with ADLs    With   [x] TE   [] TA   [] neuro   [] other: Patient Education: [x] Review HEP    [] Progressed/Changed HEP based on:   [] positioning   [] body mechanics   [] transfers   [] heat/ice application    [] other:      Other Objective/Functional Measures:   Had no difficulty with exercises  Tolerated added and increased weights today without difficulty   Pt declined CP    Pain Level (0-10 scale) post treatment: 1/10    ASSESSMENT/Changes in Function:   Pt is slowly progressing toward goals. Pt cont with decreased strength and range of motion in the right knee, but is improving. Pt cont with only slight limp on the right. Patient will continue to benefit from skilled PT services to modify and progress therapeutic interventions, address functional mobility deficits, address ROM deficits, address strength deficits, analyze and cue movement patterns, analyze and modify body mechanics/ergonomics, address imbalance/dizziness and instruct in home and community integration to attain remaining goals.      []  See Plan of Care  [x]  See progress note/recertification  []  See Discharge Summary         Progress towards goals / Updated goals:  1. Patient will improve FOTO score by 22 points in order to demonstrate a significant improvement in function.   2. Patient will improve right knee AROM 0-120 degrees in order to increase ease of ambulation.   Progressing. 12/17/19  3. Patient will improve right knee flexion MMT to 5/5 in order to increase ease of working. 4. Patient with ambulate without AD and will have no limp on the right.     PLAN  []  Upgrade activities as tolerated     [x]  Continue plan of care  []  Update interventions per flow sheet       []  Discharge due to:_  []  Other:_      Daniela Chaudhary PTA 12/17/2019  7:33 AM    Future Appointments   Date Time Provider Jean Paul Louis   12/19/2019  8:30 AM Olivia Dia, PT [facility]   12/23/2019  8:00 AM Dilma Kevin PTA JRGYDRO [facility]   12/26/2019  8:30 AM Olivia Dia, PT [id number] [facility]   12/26/2019  9:55 AM James Kya PA-C [facility]   12/30/2019  7:30 AM Dilma Kevin PTA [facility]   1/2/2020  7:30 AM Dilma Kevin PTA [facility]

## 2019-12-19 ENCOUNTER — APPOINTMENT (OUTPATIENT)
Dept: PHYSICAL THERAPY | Age: 59
End: 2019-12-19
Payer: COMMERCIAL

## 2019-12-23 ENCOUNTER — HOSPITAL ENCOUNTER (OUTPATIENT)
Dept: PHYSICAL THERAPY | Age: 59
Discharge: HOME | End: 2019-12-23
Payer: COMMERCIAL

## 2019-12-23 PROCEDURE — 97110 THERAPEUTIC EXERCISES: CPT

## 2019-12-23 NOTE — PROGRESS NOTES
PT DAILY TREATMENT NOTE 10-18    Patient Name: Sarbjit Schulz  Date:2019  : 1960  [x]  Patient  Verified  Payor: BLUE CROSS / Plan: Cynthia Hernández [id number] PPO / Product Type: PPO /    In time:800  Out time:830  Total Treatment Time (min): 30  Visit #: 3 of 8    Medicare/BCBS Only   Total Timed Codes (min):  30 1:1 Treatment Time:  30       Treatment Area: Pain in right knee [M25.561]    SUBJECTIVE  Pain Level (0-10 scale): 0/10  Any medication changes, allergies to medications, adverse drug reactions, diagnosis change, or new procedure performed?: [x] No    [] Yes (see summary sheet for update)  Subjective functional status/changes:   [] No changes reported  Pt stated that she is doing well today and has no pain    OBJECTIVE    30 min Therapeutic Exercise:  [x] See flow sheet :   Rationale: increase ROM and increase strength to improve the patients ability to increase ease with ADLs    With   [x] TE   [] TA   [] neuro   [] other: Patient Education: [x] Review HEP    [] Progressed/Changed HEP based on:   [] positioning   [] body mechanics   [] transfers   [] heat/ice application    [] other:      Other Objective/Functional Measures:   Had no difficulty with exercises  No complaint of pain during session  Right knee flex cont to improve. 117*     Pain Level (0-10 scale) post treatment: 0/10    ASSESSMENT/Changes in Function:   Pt is slowly progressing toward goals. Pt had 117* of flex in the right knee. Cont to have decreased walking tolerance. Strength is improving    Patient will continue to benefit from skilled PT services to modify and progress therapeutic interventions, address functional mobility deficits, address ROM deficits, address strength deficits, analyze and cue movement patterns, analyze and modify body mechanics/ergonomics, address imbalance/dizziness and instruct in home and community integration to attain remaining goals.      []  See Plan of Care  [x]  See progress note/recertification  []  See Discharge Summary         Progress towards goals / Updated goals:  1. Patient will improve FOTO score by 22 points in order to demonstrate a significant improvement in function.   2. Patient will improve right knee AROM 0-120 degrees in order to increase ease of ambulation.   Progressing. 12/17/19  3. Patient will improve right knee flexion MMT to 5/5 in order to increase ease of working. 4. Patient with ambulate without AD and will have no limp on the right. Goal met.  12/23/19    PLAN  []  Upgrade activities as tolerated     [x]  Continue plan of care  []  Update interventions per flow sheet       []  Discharge due to:_  []  Other:_      Zeke Wallace PTA 12/23/2019  8:03 AM    Future Appointments   Date Time Provider Jean Paul Louis   12/26/2019  8:30 AM Mickey Joshua PT [facility]   12/26/2019  9:55 AM Arminda Lopez PA-C [facility]   12/30/2019  7:30 AM Gio Hernandez PTA [facility]   1/2/2020  7:30 AM Gio Hernandez PTA [facility]

## 2019-12-26 ENCOUNTER — HOSPITAL ENCOUNTER (OUTPATIENT)
Dept: PHYSICAL THERAPY | Age: 59
Discharge: HOME | End: 2019-12-26
Payer: COMMERCIAL

## 2019-12-26 ENCOUNTER — OFFICE VISIT (OUTPATIENT)
Dept: ORTHOPEDIC SURGERY | Facility: CLINIC | Age: 59
End: 2019-12-26

## 2019-12-26 VITALS
WEIGHT: 188.6 LBS | HEIGHT: 66 IN | DIASTOLIC BLOOD PRESSURE: 72 MMHG | BODY MASS INDEX: 30.31 KG/M2 | RESPIRATION RATE: 18 BRPM | OXYGEN SATURATION: 99 % | SYSTOLIC BLOOD PRESSURE: 135 MMHG | TEMPERATURE: 96.9 F | HEART RATE: 77 BPM

## 2019-12-26 DIAGNOSIS — M25.561 RIGHT KNEE PAIN, UNSPECIFIED CHRONICITY: ICD-10-CM

## 2019-12-26 DIAGNOSIS — Z96.651 STATUS POST TOTAL RIGHT KNEE REPLACEMENT: Primary | ICD-10-CM

## 2019-12-26 PROCEDURE — 97110 THERAPEUTIC EXERCISES: CPT

## 2019-12-26 RX ORDER — METFORMIN HYDROCHLORIDE 500 MG/1
TABLET ORAL
COMMUNITY
Start: 2019-12-19 | End: 2021-03-17

## 2019-12-26 NOTE — PROGRESS NOTES
[address] [place] [address]  [phone]           Patient: Ben Juarez                MRN: [id number]       SSN: [id number]  YOB: 1960        AGE: 61 y.o. SEX: female  Body mass index is 30.44 kg/m². PCP: Ivon Ornelas MD  12/26/19      This office note has been dictated. REVIEW OF SYSTEMS:  Constitutional: Negative for fever, chills, weight loss and malaise/fatigue. HENT: Negative. Eyes: Negative. Respiratory: Negative. Cardiovascular: Negative. Gastrointestinal: No bowel incontinence or constipation. Genitourinary: No bladder incontinence or saddle anesthesia. Skin: Negative. Neurological: Negative. Endo/Heme/Allergies: Negative. Psychiatric/Behavioral: Negative. Musculoskeletal: As per HPI above. Past Medical History:   Diagnosis Date    Arthritis     Elevated cholesterol     no meds     GERD (gastroesophageal reflux disease)     Hypertension          Current Outpatient Medications:     metFORMIN (GLUCOPHAGE) 500 mg tablet, , Disp: , Rfl:     celecoxib (CELEBREX) 200 mg capsule, Take 1 Cap by mouth two (2) times a day for 90 days. , Disp: 60 Cap, Rfl: 2    ferrous sulfate 325 mg (65 mg iron) tablet, Take 1 Tab by mouth two (2) times daily (with meals). , Disp: 60 Tab, Rfl: 1    VITAMIN D2 50,000 unit capsule, take 1 capsule by mouth every week, Disp: , Rfl: 0    hydroCHLOROthiazide (HYDRODIURIL) 25 mg tablet, take 1 tablet by mouth once daily, Disp: , Rfl: 0    oxyCODONE-acetaminophen (PERCOCET 7.5) 7.5-325 mg per tablet, Take 1 Tab by mouth every six (6) hours as needed for Pain. take 1-2 tabs wvry 6 hours as needed, Disp: , Rfl:     aspirin delayed-release 325 mg tablet, Take 1 Tab by mouth two (2) times a day., Disp: 60 Tab, Rfl: 1    docusate sodium (COLACE) 100 mg capsule, Take 1 Cap by mouth two (2) times a day for 90 days. , Disp: 60 Cap, Rfl: 2    Omeprazole delayed release (PRILOSEC D/R) 20 mg tablet, take 1 tablet by mouth once daily, Disp: , Rfl: 0    No Known Allergies    Social History     Socioeconomic History    Marital status: LEGALLY      Spouse name: Not on file    Number of children: Not on file    Years of education: Not on file    Highest education level: Not on file   Occupational History    Not on file   Social Needs    Financial resource strain: Not on file    Food insecurity:     Worry: Not on file     Inability: Not on file    Transportation needs:     Medical: Not on file     Non-medical: Not on file   Tobacco Use    Smoking status: Never Smoker    Smokeless tobacco: Never Used   Substance and Sexual Activity    Alcohol use: No    Drug use: No    Sexual activity: Not on file   Lifestyle    Physical activity:     Days per week: Not on file     Minutes per session: Not on file    Stress: Not on file   Relationships    Social connections:     Talks on phone: Not on file     Gets together: Not on file     Attends [employer] service: Not on file     Active member of club or organization: Not on file     Attends meetings of clubs or organizations: Not on file     Relationship status: Not on file    Intimate partner violence:     Fear of current or ex partner: Not on file     Emotionally abused: Not on file     Physically abused: Not on file     Forced sexual activity: Not on file   Other Topics Concern    Not on file   Social History Narrative    Not on file       Past Surgical History:   Procedure Laterality Date    HX  SECTION      x 4    HX KNEE ARTHROSCOPY Right     HX KNEE REPLACEMENT Right 2018    HX TUBAL LIGATION             We did see Ms. Tami Salazar today for followup with regards to her revision right knee replacement. The patient is now eight weeks status post surgery and is doing quite well. She is quite happy with the results of the knee replacement.   There is just a little stiffness and a little soreness at times. She does continue physical therapy without complications. She has had no recent fevers, chills, systemic changes, or injuries to report. PHYSICAL EXAMINATION:  In general, the patient is alert and oriented x 3 in no acute distress. The patient is well-developed, well-nourished, with a normal affect. The patient is afebrile. Examination of the right knee reveals the skin is intact. There is no erythema, no ecchymosis, no warmth, and no signs of infection or cellulitis present. She has full range of motion, very good stability, and the patella tracks nicely. There are no rubs or crepitus noted. ASSESSMENT:  Status post revision right knee replacement doing well. PLAN:  At this point, the patient is doing extremely well with the knee replacement. She will continue to finish up her physical therapy. She will continue on her Celebrex. She does have some pain medicine at home if needed. She is given a note to return to work on January 10, 2020, with a limit of 10 pounds lifting. We will see her back in three months' time for evaluation. She will call with any questions or concerns that shall arise.                     JR Charles GARCIA, NELSON, ATC

## 2019-12-26 NOTE — PROGRESS NOTES
PT DAILY TREATMENT NOTE 10-18    Patient Name: Sorin Dueñas  Date:2019  : 1960  [x]  Patient  Verified  Payor: BLUE CROSS / Plan: [facility] PPO / Product Type: PPO /    In time:828  Out time:857  Total Treatment Time (min): 29  Visit #: 4 of 8    Medicare/BCBS Only   Total Timed Codes (min):  29 1:1 Treatment Time:  29       Treatment Area: Pain in right knee [M25.561]    SUBJECTIVE  Pain Level (0-10 scale): 0/10   Any medication changes, allergies to medications, adverse drug reactions, diagnosis change, or new procedure performed?: [x] No    [] Yes (see summary sheet for update)  Subjective functional status/changes:   [] No changes reported  Pt stated that she just has the same soreness in the knee    OBJECTIVE    29 min Therapeutic Exercise:  [x] See flow sheet :   Rationale: increase ROM and increase strength to improve the patients ability to increase ease with ADLs    With   [x] TE   [] TA   [] neuro   [] other: Patient Education: [x] Review HEP    [] Progressed/Changed HEP based on:   [] positioning   [] body mechanics   [] transfers   [] heat/ice application    [] other:      Other Objective/Functional Measures:   Had no difficulty with exercises  No complaint of increased pain during session  Range of motion is improving     Pain Level (0-10 scale) post treatment: 0/10    ASSESSMENT/Changes in Function:   Pt is progressing slowly toward goals. Pt cont with mild discomfort in the right knee. Strength and range of motion are slowly improving. Pt is walking with a normal gait pattern. Patient will continue to benefit from skilled PT services to modify and progress therapeutic interventions, address functional mobility deficits, address ROM deficits, address strength deficits, analyze and cue movement patterns, analyze and modify body mechanics/ergonomics and instruct in home and community integration to attain remaining goals.      []  See Plan of Care  [x]  See progress note/recertification  []  See Discharge Summary         Progress towards goals / Updated goals:  1. Patient will improve FOTO score by 22 points in order to demonstrate a significant improvement in function.   2. Patient will improve right knee AROM 0-120 degrees in order to increase ease of ambulation.   Progressing. 12/17/19  3. Patient will improve right knee flexion MMT to 5/5 in order to increase ease of working. 4. Patient with ambulate without AD and will have no limp on the right. Goal met.  12/23/19    PLAN  []  Upgrade activities as tolerated     [x]  Continue plan of care  []  Update interventions per flow sheet       []  Discharge due to:_  []  Other:_      Dilia Enciso PTA 12/26/2019  8:40 AM    Future Appointments   Date Time Provider Jean Paul Louis   12/26/2019  9:55 AM Gonzalo Weldon PA-C [facility]   12/30/2019  7:30 AM Jose Antonio Dominguez PTA [facility]   1/2/2020  7:30 AM NEHA HuangPTCHRISTEN [facility]

## 2019-12-26 NOTE — LETTER
NOTIFICATION RETURN TO WORK 
 
12/26/2019 9:42 AM 
 
Ms. Verenice Geiger And Thalia Brooks [id number] To Whom It May Concern: 
 
Verenice Hernandez is currently under the care of [address] [place] [address]. She will return to work on: Friday January 10th, 2020. Please limit her lifting to a maximum of 10 lbs. If there are questions or concerns please have the patient contact our office.  
 
 
 
Sincerely, 
 
 
Son Loza PA-C

## 2019-12-30 ENCOUNTER — HOSPITAL ENCOUNTER (OUTPATIENT)
Dept: PHYSICAL THERAPY | Age: 59
Discharge: HOME | End: 2019-12-30
Payer: COMMERCIAL

## 2019-12-30 PROCEDURE — 97110 THERAPEUTIC EXERCISES: CPT

## 2019-12-30 NOTE — PROGRESS NOTES
PT DAILY TREATMENT NOTE 10-18    Patient Name: Corina Stone  Date:2019  : 1960  [x]  Patient  Verified  Payor: BLUE CROSS / Plan: [facility] PPO / Product Type: PPO /    In time:728  Out time:800  Total Treatment Time (min): 32  Visit #: 5 of 8    Medicare/BCBS Only   Total Timed Codes (min):  32 1:1 Treatment Time:  32       Treatment Area: Pain in right knee [M25.561]    SUBJECTIVE  Pain Level (0-10 scale): 0/10  Any medication changes, allergies to medications, adverse drug reactions, diagnosis change, or new procedure performed?: [x] No    [] Yes (see summary sheet for update)  Subjective functional status/changes:   [] No changes reported  Pt stated that she is doing real good today and no soreness or pain today    OBJECTIVE    32 min Therapeutic Exercise:  [x] See flow sheet :   Rationale: increase ROM and increase strength to improve the patients ability to increase ease with ADLs    With   [x] TE   [] TA   [] neuro   [] other: Patient Education: [x] Review HEP    [] Progressed/Changed HEP based on:   [] positioning   [] body mechanics   [] transfers   [] heat/ice application    [] other:      Other Objective/Functional Measures:   Had no difficulty with exercises  No complaint of pain during session     Pain Level (0-10 scale) post treatment: 0/10    ASSESSMENT/Changes in Function:   Pt is progressing well toward goals and is to be discharged next visit with updated HEP    Patient will continue to benefit from skilled PT services to modify and progress therapeutic interventions, address functional mobility deficits, address ROM deficits, address strength deficits, analyze and cue movement patterns, analyze and modify body mechanics/ergonomics and  to attain remaining goals. []  See Plan of Care  [x]  See progress note/recertification  []  See Discharge Summary         Progress towards goals / Updated goals:  1.  Patient will improve FOTO score by 22 points in order to demonstrate a significant improvement in function.   2. Patient will improve right knee AROM 0-120 degrees in order to increase ease of ambulation.   Progressing. 12/17/19  3. Patient will improve right knee flexion MMT to 5/5 in order to increase ease of working. 4. Patient with ambulate without AD and will have no limp on the right. Goal met.  12/23/19    PLAN  []  Upgrade activities as tolerated     [x]  Continue plan of care  []  Update interventions per flow sheet       []  Discharge due to:_  []  Other:_      Islas NEHA Staley 12/30/2019  7:27 AM    Future Appointments   Date Time Provider Jean Paul Louis   12/30/2019  7:30 AM Nicolle Ernandez PTA [facility]   1/2/2020  7:30 AM Nicolle Ernandez PTA [facility]   3/26/2020  9:40 AM NELSON Dill 75

## 2020-01-02 ENCOUNTER — HOSPITAL ENCOUNTER (OUTPATIENT)
Dept: PHYSICAL THERAPY | Age: 60
Discharge: HOME | End: 2020-01-02
Payer: COMMERCIAL

## 2020-01-02 PROCEDURE — 97110 THERAPEUTIC EXERCISES: CPT

## 2020-01-02 NOTE — PROGRESS NOTES
PT DISCHARGE DAILY NOTE AND [id number]    Patient name: Mar Jimenez Start of Care: 2019   Referral source: Christiano Garcia : 1960                Medical Diagnosis: Presence of right artificial knee joint [Z96.651]  Other acute postprocedural pain [G89.18]  Unilateral primary osteoarthritis, right knee [M17.11]  Payor: BLUE CROSS / Plan: [employer] [id number] PPO / Product Type: PPO /  Onset Date: 10/23/19                Treatment Diagnosis:  Right knee pain   Prior Hospitalization: see medical history Provider#: [id number]   Medications: Verified on Patient summary List    Comorbidities: none reported   Prior Level of Function: Ind with ambulation, working as a           Visits from [facility]: 13    Missed Visits: 0    Reporting Period : 12/10/2019 to 2020    Date:2020  : 1960  [x]  Patient  Verified  Payor: BLUE CROSS / Plan: [employer] [id number] PPO / Product Type: PPO /    In time:730  Out time:748  Total Treatment Time (min): 18  Visit #: 6 of 8    Medicare/BCBS Only   Total Timed Codes (min):  18 1:1 Treatment Time:  18       SUBJECTIVE  Pain Level (0-10 scale): 0/10  Any medication changes, allergies to medications, adverse drug reactions, diagnosis change, or new procedure performed?: [x] No    [] Yes (see summary sheet for update)  Subjective functional status/changes:   [] No changes reported  Pt stated that she is doing well today    OBJECTIVE    18 min Therapeutic Exercise:  [x] See flow sheet :   Rationale: increase ROM and increase strength to improve the patients ability to increase ease with ADLs    With   [x] TE   [] TA   [] neuro   [] other: Patient Education: [x] Review HEP    [x] Progressed/Changed HEP based on:   [] positioning   [] body mechanics   [] transfers   [] heat/ice application    [] other:      Other Objective/Functional Measures:   Pt was issued updated HEP  Pt showed understanding of all exercises     Pain Level (0-10 scale) post treatment: 0/10    Summary of Care:  1. Patient will improve FOTO score by 22 points in order to demonstrate a significant improvement in function.   Goal met. Increased from 39 to 70.  2. Patient will improve right knee AROM 0-120 degrees in order to increase ease of ambulation.   Progressing. Right knee AROM is -2-120*  3. Patient will improve right knee flexion MMT to 5/5 in order to increase ease of working. Goal met. Strength in right knee is 5/5 for flex and ext  4. Patient with ambulate without AD and will have no limp on the right. Goal met. 12/23/19    ASSESSMENT/Changes in Function:   Patient progressed well with therapy. Strength and range of motion improved in the right knee. Patient is able to ambulate without difficulty and uses no assistive device. Strength improved to 5/5 for both flexion and extension. FOTO score increased 31 points which indicates increased functional ability. Patient was issued final HEP and is to continue with home program to maintain strength and range of motion.     Thank you for this referral!      PLAN  [x]Discontinue therapy: [x]Patient has reached or is progressing toward set goals      []Patient is non-compliant or has abdicated      []Due to lack of appreciable progress towards set goals    Kenia Back PTA 1/2/2020  7:26 AM

## 2020-06-11 ENCOUNTER — HOSPITAL ENCOUNTER (OUTPATIENT)
Dept: LAB | Age: 60
Discharge: HOME | End: 2020-06-11
Payer: COMMERCIAL

## 2020-06-11 ENCOUNTER — OFFICE VISIT (OUTPATIENT)
Dept: ORTHOPEDIC SURGERY | Facility: CLINIC | Age: 60
End: 2020-06-11

## 2020-06-11 VITALS — HEIGHT: 66 IN | TEMPERATURE: 96.9 F | BODY MASS INDEX: 30.44 KG/M2

## 2020-06-11 DIAGNOSIS — Z96.651 HISTORY OF TOTAL RIGHT KNEE REPLACEMENT (TKR): Primary | ICD-10-CM

## 2020-06-11 DIAGNOSIS — Z96.651 HISTORY OF TOTAL RIGHT KNEE REPLACEMENT (TKR): ICD-10-CM

## 2020-06-11 DIAGNOSIS — M25.561 RIGHT KNEE PAIN, UNSPECIFIED CHRONICITY: ICD-10-CM

## 2020-06-11 LAB
BASOPHILS # BLD: 0 K/UL (ref 0–0.1)
BASOPHILS NFR BLD: 0 % (ref 0–2)
CRP SERPL-MCNC: 0.3 MG/DL (ref 0–0.3)
DIFFERENTIAL METHOD BLD: NORMAL
EOSINOPHIL # BLD: 0.1 K/UL (ref 0–0.4)
EOSINOPHIL NFR BLD: 2 % (ref 0–5)
ERYTHROCYTE [DISTWIDTH] IN BLOOD BY AUTOMATED COUNT: 14 % (ref 11.6–14.5)
ERYTHROCYTE [SEDIMENTATION RATE] IN BLOOD: 16 MM/HR (ref 0–30)
HCT VFR BLD AUTO: 41.4 % (ref 35–45)
HGB BLD-MCNC: 13.4 G/DL (ref 12–16)
LYMPHOCYTES # BLD: 1.9 K/UL (ref 0.9–3.6)
LYMPHOCYTES NFR BLD: 28 % (ref 21–52)
MCH RBC QN AUTO: 27.9 PG (ref 24–34)
MCHC RBC AUTO-ENTMCNC: 32.4 G/DL (ref 31–37)
MCV RBC AUTO: 86.1 FL (ref 74–97)
MONOCYTES # BLD: 0.5 K/UL (ref 0.05–1.2)
MONOCYTES NFR BLD: 7 % (ref 3–10)
NEUTS SEG # BLD: 4.3 K/UL (ref 1.8–8)
NEUTS SEG NFR BLD: 63 % (ref 40–73)
PLATELET # BLD AUTO: 368 K/UL (ref 135–420)
PMV BLD AUTO: 9.7 FL (ref 9.2–11.8)
RBC # BLD AUTO: 4.81 M/UL (ref 4.2–5.3)
URATE SERPL-MCNC: 4.9 MG/DL (ref 2.6–7.2)
WBC # BLD AUTO: 6.8 K/UL (ref 4.6–13.2)

## 2020-06-11 PROCEDURE — 36415 COLL VENOUS BLD VENIPUNCTURE: CPT

## 2020-06-11 PROCEDURE — 85025 COMPLETE CBC W/AUTO DIFF WBC: CPT

## 2020-06-11 PROCEDURE — 85652 RBC SED RATE AUTOMATED: CPT

## 2020-06-11 PROCEDURE — 83520 IMMUNOASSAY QUANT NOS NONAB: CPT

## 2020-06-11 PROCEDURE — 86140 C-REACTIVE PROTEIN: CPT

## 2020-06-11 PROCEDURE — 84550 ASSAY OF BLOOD/URIC ACID: CPT

## 2020-06-11 RX ORDER — MELOXICAM 15 MG/1
15 TABLET ORAL DAILY
Qty: 30 TAB | Refills: 2 | Status: SHIPPED | OUTPATIENT
Start: 2020-06-11

## 2020-06-11 NOTE — PROGRESS NOTES
[address] [place] [address]  [phone]           Patient: Erasto Restrepo                MRN: [id number]       SSN: [id number]  YOB: 1960        AGE: 61 y.o. SEX: female  Body mass index is 30.44 kg/m². PCP: Elvira Ramos MD  06/11/20      This office note has been dictated. REVIEW OF SYSTEMS:  Constitutional: Negative for fever, chills, weight loss and malaise/fatigue. HENT: Negative. Eyes: Negative. Respiratory: Negative. Cardiovascular: Negative. Gastrointestinal: No bowel incontinence or constipation. Genitourinary: No bladder incontinence or saddle anesthesia. Skin: Negative. Neurological: Negative. Endo/Heme/Allergies: Negative. Psychiatric/Behavioral: Negative. Musculoskeletal: As per HPI above. Past Medical History:   Diagnosis Date    Arthritis     Elevated cholesterol     no meds     GERD (gastroesophageal reflux disease)     Hypertension          Current Outpatient Medications:     meloxicam (MOBIC) 15 mg tablet, Take 1 Tab by mouth daily. , Disp: 30 Tab, Rfl: 2    metFORMIN (GLUCOPHAGE) 500 mg tablet, , Disp: , Rfl:     oxyCODONE-acetaminophen (PERCOCET 7.5) 7.5-325 mg per tablet, Take 1 Tab by mouth every six (6) hours as needed for Pain. take 1-2 tabs wvry 6 hours as needed, Disp: , Rfl:     aspirin delayed-release 325 mg tablet, Take 1 Tab by mouth two (2) times a day., Disp: 60 Tab, Rfl: 1    ferrous sulfate 325 mg (65 mg iron) tablet, Take 1 Tab by mouth two (2) times daily (with meals). , Disp: 60 Tab, Rfl: 1    VITAMIN D2 50,000 unit capsule, take 1 capsule by mouth every week, Disp: , Rfl: 0    hydroCHLOROthiazide (HYDRODIURIL) 25 mg tablet, take 1 tablet by mouth once daily, Disp: , Rfl: 0    Omeprazole delayed release (PRILOSEC D/R) 20 mg tablet, take 1 tablet by mouth once daily, Disp: , Rfl: 0    No Known Allergies    Social History     Socioeconomic History    Marital status: LEGALLY      Spouse name: Not on file    Number of children: Not on file    Years of education: Not on file    Highest education level: Not on file   Occupational History    Not on file   Social Needs    Financial resource strain: Not on file    Food insecurity     Worry: Not on file     Inability: Not on file    Transportation needs     Medical: Not on file     Non-medical: Not on file   Tobacco Use    Smoking status: Never Smoker    Smokeless tobacco: Never Used   Substance and Sexual Activity    Alcohol use: No    Drug use: No    Sexual activity: Not on file   Lifestyle    Physical activity     Days per week: Not on file     Minutes per session: Not on file    Stress: Not on file   Relationships    Social connections     Talks on phone: Not on file     Gets together: Not on file     Attends Alevism service: Not on file     Active member of club or organization: Not on file     Attends meetings of clubs or organizations: Not on file     Relationship status: Not on file    Intimate partner violence     Fear of current or ex partner: Not on file     Emotionally abused: Not on file     Physically abused: Not on file     Forced sexual activity: Not on file   Other Topics Concern    Not on file   Social History Narrative    Not on file       Past Surgical History:   Procedure Laterality Date    HX  SECTION      x 4    HX KNEE ARTHROSCOPY Right     HX KNEE REPLACEMENT Right 2018    HX TUBAL LIGATION           Patient seen evaluate today for her right knee. She is now approximately 9 to 10 months status post revision right knee replacement. She is having pain in her knee. She had pain at rest as well as with ambulation. She denies any start up pain. She does have her knee give on her at times. She denies any specific increased discomfort with stairs or getting from a chair. She does have night discomfort.     She taken Tylenol over-the-counter as well as occasional ibuprofen. Patient denies recent fevers, chills, chest pain, SOB, or injuries. No recent systemic changes noted. A 12-point review of systems is performed today. Pertinent positives are noted. All other systems reviewed and otherwise are negative. Physical exam: General: Alert and oriented x3, nad.  well-developed, well nourished. normal affect, AF. NC/AT, EOMI, neck supple, trachea midline, no JVD present. Breathing is non-labored. Examination of lower extremities reveals pain-free range of the hips. There is no pain to palpation of the trochanteric bursa. Negative straight leg raise. Negative calf tenderness. No Homans. No signs of DVT present. The right knee of a skin intact. There is no erythema, ecchymosis, warmth. There is no signs of infection or cellulitis present. Range of motion is full extension to approximately 110 degrees of flexion. The patella tracks nicely. Stability is quite good. She does have generalized tenderness to palpation about the knee. Radiographs obtained in office today including AP, lateral, skyline of the right knee shows the total knee components to be well fixed without evidence of loosening or fracture noted. Does have a slight tilt of the patella noted on the skyline view. Films done 6/11/2020 at the [facility] location. Assessment: #1 status post revision right knee replacement, #2 synovitis right knee    Plan: At this point, we can move forward with obtaining some basic labs including CBC, ESR, CRP, IL-6, uric acid. Also obtain a series of bone scans indium and sulfur colloid. We will start on Mobic 15 mg once a day with food. She is instructed on use as well as usual precautions. We will get her a note to keep her out of work and she is having great difficulty in completing her daily tasks at work due to the discomfort of her knee. We will see her back after these studies for further evaluation.               Elyse Sportsmayte MPAS, PA-C, ATC

## 2020-06-11 NOTE — LETTER
EXCUSE FROM WORK 
 
6/11/2020 9:36 AM 
 
Ms. Ethyl [place] [id number] To Whom It May Concern: 
 
Ethyl Soulier is currently under the care of [address] [place] [address]. Please excuse her from work until further notice. If there are questions or concerns please have the patient contact our office.  
 
 
 
Sincerely, 
 
 
Valeria Nieves PA-C

## 2020-06-12 LAB — IL6 SERPL-MCNC: 4.7 PG/ML (ref 0–15.5)

## 2020-06-24 ENCOUNTER — HOSPITAL ENCOUNTER (OUTPATIENT)
Dept: NUCLEAR MEDICINE | Age: 60
Discharge: HOME | End: 2020-06-24
Attending: PHYSICIAN ASSISTANT
Payer: COMMERCIAL

## 2020-06-24 DIAGNOSIS — Z96.651 HISTORY OF TOTAL RIGHT KNEE REPLACEMENT (TKR): ICD-10-CM

## 2020-06-24 DIAGNOSIS — M25.561 RIGHT KNEE PAIN, UNSPECIFIED CHRONICITY: ICD-10-CM

## 2020-06-24 PROCEDURE — 78315 BONE IMAGING 3 PHASE: CPT

## 2020-06-24 PROCEDURE — 78831 RP LOCLZJ TUM SPECT 2 AREAS: CPT

## 2020-06-25 ENCOUNTER — HOSPITAL ENCOUNTER (OUTPATIENT)
Dept: NUCLEAR MEDICINE | Age: 60
Discharge: HOME | End: 2020-06-25
Attending: PHYSICIAN ASSISTANT
Payer: COMMERCIAL

## 2020-06-26 ENCOUNTER — TELEPHONE (OUTPATIENT)
Dept: ORTHOPEDIC SURGERY | Age: 60
End: 2020-06-26

## 2020-06-26 ENCOUNTER — HOSPITAL ENCOUNTER (OUTPATIENT)
Dept: NUCLEAR MEDICINE | Age: 60
Discharge: HOME | End: 2020-06-26
Attending: PHYSICIAN ASSISTANT
Payer: COMMERCIAL

## 2020-06-26 DIAGNOSIS — M25.561 RIGHT KNEE PAIN, UNSPECIFIED CHRONICITY: ICD-10-CM

## 2020-06-26 DIAGNOSIS — Z96.651 HISTORY OF TOTAL RIGHT KNEE REPLACEMENT (TKR): ICD-10-CM

## 2020-06-26 PROCEDURE — 78102 BONE MARROW IMAGING LTD: CPT

## 2020-06-26 NOTE — TELEPHONE ENCOUNTER
The patient has had all of the testing done that was ordered 06/11. Stacey Tavera does not have any availability. Can she be fit in or can she be scheduled with another PA? Please advise.  [phone]

## 2020-06-29 NOTE — TELEPHONE ENCOUNTER
Message given to the [facility] to contact patient to  her a f/u appointment next week per MARY Soto.

## 2020-07-06 ENCOUNTER — OFFICE VISIT (OUTPATIENT)
Dept: ORTHOPEDIC SURGERY | Facility: CLINIC | Age: 60
End: 2020-07-06

## 2020-07-06 VITALS
BODY MASS INDEX: 30.02 KG/M2 | RESPIRATION RATE: 16 BRPM | SYSTOLIC BLOOD PRESSURE: 128 MMHG | HEART RATE: 62 BPM | DIASTOLIC BLOOD PRESSURE: 73 MMHG | OXYGEN SATURATION: 100 % | TEMPERATURE: 97.6 F | WEIGHT: 186 LBS

## 2020-07-06 DIAGNOSIS — M25.561 RIGHT KNEE PAIN, UNSPECIFIED CHRONICITY: Primary | ICD-10-CM

## 2020-07-06 RX ORDER — TRIAMCINOLONE ACETONIDE 40 MG/ML
40 INJECTION, SUSPENSION INTRA-ARTICULAR; INTRAMUSCULAR ONCE
Qty: 1 ML | Refills: 0
Start: 2020-07-06 | End: 2020-07-06

## 2020-07-06 NOTE — LETTER
NOTIFICATION RETURN TO WORK  
 
7/6/2020 8:53 AM 
 
Ms. Zonia [place] [phone] To Whom It May Concern: 
 
Zonia Bloch is currently under the care of [address] [place] [address]. She will remain out of work until August 10, 2020. If there are questions or concerns please have the patient contact our office.  
 
 
 
Sincerely, 
 
 
Karely Longoria PA-C

## 2020-07-06 NOTE — PROGRESS NOTES
Ms. Jaky Ramirez returns to the office following for right knee pain. She was seen under the care of Dr. Arlina Goodpasture with a recent revision of her right knee in October 2019. Recently she was seen by Barbara Doan PA-C for continued right knee pain. A nuclear magnetic bone scan was ordered as well as blood work for follow-up to rule out infection/loosening. She was found to have a C-reactive protein of 0.3 interleukin-6 of 4.7 sed rate of 16 and a uric acid of 4.9. Generally all of those levels were within normal limits. The nuclear magnetic bone scan reads as below. Status: Final result (Exam End: 6/24/2020 12:15) Provider Status: Reviewed   Study Result     THREE PHASE BONE SCAN and ABSCESS LOCALIZATION and SULFUR COLLOID LTD     INDICATION: Right knee pain. Status post right knee revision 10/2019. Clinical  question of infection versus loosening     COMPARISON: 8/9/2019; radiograph 6/11/2020; 10/23/2019.     TECHNIQUE: Following IV administration of 26 mCi 99m Technetium MDP, three phase  bone scan imaging of the bilateral knee was performed. Same day IV  administration of 539 microcuries 111Indium-labeled autologous white blood  cells, approximately 24 hour delayed planar images of the bilateral knee were  obtained in anterior and posterior and lateral projections. The patient was then  injected with 10 mCi technetium 99m sulfur colloid in the left antecubital fossa  at 48 hours. This was followed by bilateral knee anterior, posterior and lateral  imaging using dual image isotope acquisition.     BONE SCAN FINDINGS:       Angiographic flow phase imaging demonstrates mild hyperemia of the suprapatellar  right knee soft tissues. Left knee unremarkable.     Blood pool imaging demonstrates moderate diffuse soft tissue uptake  predominantly in the right suprapatellar soft tissues.  Left knee unremarkable.     Delayed phase imaging demonstrates right knee photopenia secondary to long stem  tibia and fibular prostheses. There is moderate increased activity along the  medial femoral condyle component, medial and lateral tibial plateau. No focal  increased uptake along the stem. There is mild activity in the medial  compartment left knee.     WHITE BLOOD CELL/SULFUR COLLOID FINDINGS:     There is moderate focal increased uptake at the right medial femoral condyle and  at the anterior tibial plateau which is matched with the sulfur colloid study. Left knee unremarkable.     IMPRESSION  IMPRESSION:     No scintigraphic finding for right knee prosthetic infection.     Nonspecific bone scan activity on delayed phase imaging at the medial femoral  condyle and tibial plateaus on the right. Favor postsurgical change. Early  changes of loosening that less likely though not entirely excluded. Continued  radiographic follow-up in these areas suggested to assess for change.     Moderate soft tissue uptake in the right suprapatellar soft tissues without  white blood cell correlation may represent inflammation. On exam today patient has a well-healed midline surgical incision over the anterior aspect of the right knee measuring 16 cm cranial/caudal oriented. There is no evidence of wound dehiscence or infection. There are 2 stab incisions associated both of the medial anterior and lateral anterior portals secondary to her previous arthroscopy. Patient's active range of motion with out pain 110 degrees -5 today. No instability on varus or valgus stressing. Patella tracks midline. There is noted pronounced tenderness over the peds anserine us insertion. Patient has no palpable masses associated with the region. No popliteal tenderness. No evidence of DVT or calf tenderness. Impression   1. status post primary total joint replacement under the care of Dr. Roberta Asencio with a revision under Dr. Vel Bellamy direction October 2019 with progressive pain following the last surgical encounter.   2.  Peds anserine us bursitis of the right knee  3. Right knee pain  4. Obesity BMI 30.0 to weight 186 pounds height 5 foot. Plan I am currently recommending after reviewing the data from the metabolic panel and the nuclear magnetic bone scan and the findings on clinical exam today of peds anserine us bursitis a low-dose cortisone injection to the peds anserine us insertion. Procedure: Using sterile technique and verbal and written consent were obtained appropriate timeout formed 1/2 cc of Kenalog at 40 mg/mL mixed with 2 mL's of Sensorcaine 0.75% injected at the point of maximal tenderness consistent with the peds anserine us bursa there were no complications. Patient tolerated the procedure well. Chart reviewed for the following:   Blanca Galvan PA-C, have reviewed the History, Physical and updated the Allergic reactions for [address] performed immediately prior to start of procedure:   I, Isaiah Galvan PA-C, have performed the following reviews on Blossom Adamsonl prior to the start of the procedure:            * Patient was identified by name and date of birth   * Agreement on procedure being performed was verified  * Risks and Benefits explained to the patient  * Procedure site verified and marked as necessary  * Patient was positioned for comfort  * Consent was signed and verified             Date of procedure: 07/06/20    Time: 9:06 AM    Procedure performed by:  Mami Casper PA-C    Provider assisted by: None     Patient assisted by: self    How tolerated by patient: tolerated the procedure well with no complications    Comments: none    Note for out of work planned extended out till August 10, 2020. Patient to follow-up with Dr. Edson Munguia within the next 3 weeks. Today all of her questions answered to her satisfaction copy of her labs reviewed and provided.

## 2020-07-27 ENCOUNTER — OFFICE VISIT (OUTPATIENT)
Dept: ORTHOPEDIC SURGERY | Facility: CLINIC | Age: 60
End: 2020-07-27

## 2020-07-27 VITALS
WEIGHT: 183.2 LBS | DIASTOLIC BLOOD PRESSURE: 82 MMHG | BODY MASS INDEX: 29.44 KG/M2 | TEMPERATURE: 96.5 F | HEART RATE: 64 BPM | HEIGHT: 66 IN | SYSTOLIC BLOOD PRESSURE: 131 MMHG

## 2020-07-27 DIAGNOSIS — Z96.651 STATUS POST TOTAL RIGHT KNEE REPLACEMENT: Primary | ICD-10-CM

## 2020-07-27 NOTE — PROGRESS NOTES
Ms. Jaky Ramirez returns to the office following a visit on 7/6/2020. She was found to have symptoms of peds anserine us bursitis of the right knee and treated appropriately with a cortisone injection. She received only minimal relief from the injection. She still has pain about right knee revision and distal femur. She was due to follow-up with Dr. Lashay Mosqueda to review the previous infection labs as well as bone scan which were discussed at patient's last OV. She was not able to follow with Dr. Lashay Mosqueda for scheduling reasons. Her pain to her distal right thigh/right knee dull and achy with swelling noted. Pain rating at rest 3-4 on a 10 point scale with activity 5-6 on a 10 point scale. Pain is however improved when compared to prior to her revision surgery. Exam of the knee reveals a well-healed midline cranial/caudal oriented incision. Patient has no instability on varus or valgus stressing. Patella tracks midline. No laxity in the anterior posterior plane. Active range of motion 110 degrees -5 today. X-rays: [facility] 7/27/2020 right knee anatomically aligned with revision total joint hardware noted. No evidence of periprosthetic fracturing or dislocation. Plan: Patient follow-up with Dr. Lashay Mosqueda this week for continued definitive care. X-rays reviewed today all of her questions answered to her satisfaction. Ms. Jaky Ramirez returns to the office following for right knee pain. She was seen under the care of Dr. Arlina Goodpasture with a recent revision of her right knee in October 2019. Recently she was seen by Gricelda Zambrano PA-C for continued right knee pain. A nuclear magnetic bone scan was ordered as well as blood work for follow-up to rule out infection/loosening. She was found to have a C-reactive protein of 0.3 interleukin-6 of 4.7 sed rate of 16 and a uric acid of 4.9. Generally all of those levels were within normal limits.   The nuclear magnetic bone scan reads as below.    Status: Final result (Exam End: 6/24/2020 12:15) Provider Status: Reviewed   Study Result     THREE PHASE BONE SCAN and ABSCESS LOCALIZATION and SULFUR COLLOID LTD     INDICATION: Right knee pain. Status post right knee revision 10/2019. Clinical  question of infection versus loosening     COMPARISON: 8/9/2019; radiograph 6/11/2020; 10/23/2019.     TECHNIQUE: Following IV administration of 26 mCi 99m Technetium MDP, three phase  bone scan imaging of the bilateral knee was performed. Same day IV  administration of 539 microcuries 111Indium-labeled autologous white blood  cells, approximately 24 hour delayed planar images of the bilateral knee were  obtained in anterior and posterior and lateral projections. The patient was then  injected with 10 mCi technetium 99m sulfur colloid in the left antecubital fossa  at 48 hours. This was followed by bilateral knee anterior, posterior and lateral  imaging using dual image isotope acquisition.     BONE SCAN FINDINGS:       Angiographic flow phase imaging demonstrates mild hyperemia of the suprapatellar  right knee soft tissues. Left knee unremarkable.     Blood pool imaging demonstrates moderate diffuse soft tissue uptake  predominantly in the right suprapatellar soft tissues. Left knee unremarkable.     Delayed phase imaging demonstrates right knee photopenia secondary to long stem  tibia and fibular prostheses. There is moderate increased activity along the  medial femoral condyle component, medial and lateral tibial plateau. No focal  increased uptake along the stem. There is mild activity in the medial  compartment left knee.     WHITE BLOOD CELL/SULFUR COLLOID FINDINGS:     There is moderate focal increased uptake at the right medial femoral condyle and  at the anterior tibial plateau which is matched with the sulfur colloid study.   Left knee unremarkable.     IMPRESSION  IMPRESSION:     No scintigraphic finding for right knee prosthetic infection.     Nonspecific bone scan activity on delayed phase imaging at the medial femoral  condyle and tibial plateaus on the right. Favor postsurgical change. Early  changes of loosening that less likely though not entirely excluded. Continued  radiographic follow-up in these areas suggested to assess for change.     Moderate soft tissue uptake in the right suprapatellar soft tissues without  white blood cell correlation may represent inflammation. On exam today patient has a well-healed midline surgical incision over the anterior aspect of the right knee measuring 16 cm cranial/caudal oriented. There is no evidence of wound dehiscence or infection. There are 2 stab incisions associated both of the medial anterior and lateral anterior portals secondary to her previous arthroscopy. Patient's active range of motion with out pain 110 degrees -5 today. No instability on varus or valgus stressing. Patella tracks midline. There is noted pronounced tenderness over the peds anserine us insertion. Patient has no palpable masses associated with the region. No popliteal tenderness. No evidence of DVT or calf tenderness. Impression   1. status post primary total joint replacement under the care of Dr. Lola Sheridan with a revision under Dr. Brinda Lynn direction October 2019 with progressive pain following the last surgical encounter. 2.  Peds anserine us bursitis of the right knee  3. Right knee pain  4. Obesity BMI 30.0 to weight 186 pounds height 5 foot. Plan I am currently recommending after reviewing the data from the metabolic panel and the nuclear magnetic bone scan and the findings on clinical exam today of peds anserine us bursitis a low-dose cortisone injection to the peds anserine us insertion.     Procedure: Using sterile technique and verbal and written consent were obtained appropriate timeout formed 1/2 cc of Kenalog at 40 mg/mL mixed with 2 mL's of Sensorcaine 0.75% injected at the point of maximal tenderness consistent with the peds anserine us bursa there were no complications. Patient tolerated the procedure well. Chart reviewed for the following:   Marcin Galvan PA-C, have reviewed the History, Physical and updated the Allergic reactions for [address] performed immediately prior to start of procedure:   Daja ROSALES PA-C, have performed the following reviews on Adams Mendiola prior to the start of the procedure:            * Patient was identified by name and date of birth   * Agreement on procedure being performed was verified  * Risks and Benefits explained to the patient  * Procedure site verified and marked as necessary  * Patient was positioned for comfort  * Consent was signed and verified             Date of procedure: 07/27/20    Time: 9:06 AM    Procedure performed by:  Cherelle Tyler PA-C    Provider assisted by: None     Patient assisted by: self    How tolerated by patient: tolerated the procedure well with no complications    Comments: none    Note for out of work planned extended out till August 10, 2020. Patient to follow-up with Dr. Nati Rojas within the next 3 weeks. Today all of her questions answered to her satisfaction copy of her labs reviewed and provided.

## 2020-07-29 ENCOUNTER — OFFICE VISIT (OUTPATIENT)
Dept: ORTHOPEDIC SURGERY | Facility: CLINIC | Age: 60
End: 2020-07-29

## 2020-07-29 VITALS
OXYGEN SATURATION: 100 % | SYSTOLIC BLOOD PRESSURE: 124 MMHG | HEART RATE: 63 BPM | TEMPERATURE: 97 F | RESPIRATION RATE: 14 BRPM | HEIGHT: 66 IN | DIASTOLIC BLOOD PRESSURE: 83 MMHG | WEIGHT: 181.4 LBS | BODY MASS INDEX: 29.15 KG/M2

## 2020-07-29 DIAGNOSIS — M54.50 LUMBAR PAIN: Primary | ICD-10-CM

## 2020-07-29 DIAGNOSIS — M25.561 RIGHT KNEE PAIN, UNSPECIFIED CHRONICITY: ICD-10-CM

## 2020-07-29 DIAGNOSIS — Z96.651 STATUS POST TOTAL RIGHT KNEE REPLACEMENT: ICD-10-CM

## 2020-07-29 NOTE — LETTER
7/29/2020 8:49 AM 
 
Ms. Ruchi Pierce [address] [facility] [phone] To Whom It May Concern: 
 
Ruchi Pierce is currently under the care of [address] [place] [address]. If available, Ms. Sahara Andino should be restricted to sedentary work only if available. This is a permanent restriction. If there are questions or concerns please have the patient contact our office. Sincerely, Shakir Castellanos MD

## 2020-07-29 NOTE — LETTER
7/29/2020 8:52 AM 
 
Ms. Aaron Young [address] [facility] [phone] To Whom It May Concern: 
 
Aaron Young is currently under the care of [address] [place] [address]. If available, Ms. Alize Sevilla should be restricted to sedentary work only. This is a permanent restriction. If there are questions or concerns please have the patient contact our office. Sincerely, Jessie Kenny MD

## 2020-07-29 NOTE — PROGRESS NOTES
Patient: Erwin Landry                MRN: [id number]       SSN: [id number]  YOB: 1960        AGE: 61 y.o. SEX: female  Body mass index is 29.28 kg/m². PCP: Kan Basilio MD  07/29/20  HISTORY:  I had the pleasure of reviewing Ms. Malvin Olivarez today. As you know, Ms. Malvin Olivarez had a knee replacement done elsewhere and she had loose knee replacement with instability. She got a revision. She has been well worked up for infection, including negative labs, also negative bone scan as well. X-rays earlier this month in 07/2020 confirming that the implants are well aligned and well fixed. She had a pes injection which was not helpful. She tends not to sleep with sheets. Denies calf pain. She also indicates some numbness and tingling in the leg and she just generally points to the anterior aspect of the knee as being uncomfortable for her, it can feel tight. Denies start-up pain. Denies swelling. Denies fevers or chills or night sweats or weight loss. PHYSICAL EXAMINATION:  Today, there is no start-up pain. Her motion is excellent. She comes fully straight and bends well. The knee is nice and stable. The patella clinically is tracking quite nicely. There are no rubs or catching. I do notice that her quadriceps are about a 4+/5. They could be stronger. There is no instability. Hip rotates well. She has significant neuropathy in both lower extremities, but no foot drop. Calf nontender. The knee is not hot. It is not red. It is not swollen and it is actually a fairly benign exam overall. A little bit of hypersensitivity of the skin at the level of the knee. PLAN:  At this point, I do not think she should have an operation. Having said that, she does have a chronically painful knee. I recommend pain management. I recommend therapy for strengthening.   I would like to get an MRI of her back with regards to her fairly significant neuropathy in both lower extremities. CC:  Herbert Gunter MD          REVIEW OF SYSTEMS:      CON: negative  EYE: negative   ENT: negative  RESP: negative  GI:    negative   :  negative  MSK: Positive  A twelve point review of systems was completed, positives noted and all other systems were reviewed and are negative          Past Medical History:   Diagnosis Date    Arthritis     Elevated cholesterol     no meds     GERD (gastroesophageal reflux disease)     Hypertension        No family history on file. Current Outpatient Medications   Medication Sig Dispense Refill    meloxicam (MOBIC) 15 mg tablet Take 1 Tab by mouth daily. 30 Tab 2    metFORMIN (GLUCOPHAGE) 500 mg tablet       oxyCODONE-acetaminophen (PERCOCET 7.5) 7.5-325 mg per tablet Take 1 Tab by mouth every six (6) hours as needed for Pain. take 1-2 tabs wvry 6 hours as needed      aspirin delayed-release 325 mg tablet Take 1 Tab by mouth two (2) times a day. 60 Tab 1    ferrous sulfate 325 mg (65 mg iron) tablet Take 1 Tab by mouth two (2) times daily (with meals).  60 Tab 1    VITAMIN D2 50,000 unit capsule take 1 capsule by mouth every week  0    hydroCHLOROthiazide (HYDRODIURIL) 25 mg tablet take 1 tablet by mouth once daily  0    Omeprazole delayed release (PRILOSEC D/R) 20 mg tablet take 1 tablet by mouth once daily  0       No Known Allergies    Past Surgical History:   Procedure Laterality Date    HX  SECTION      x 4    HX KNEE ARTHROSCOPY Right     HX KNEE REPLACEMENT Right 2018    HX TUBAL LIGATION         Social History     Socioeconomic History    Marital status: LEGALLY      Spouse name: Not on file    Number of children: Not on file    Years of education: Not on file    Highest education level: Not on file   Occupational History    Not on file   Social Needs    Financial resource strain: Not on file    Food insecurity     Worry: Not on file     Inability: Not on file    Transportation needs     Medical: Not on file     Non-medical: Not on file   Tobacco Use    Smoking status: Never Smoker    Smokeless tobacco: Never Used   Substance and Sexual Activity    Alcohol use: No    Drug use: No    Sexual activity: Not on file   Lifestyle    Physical activity     Days per week: Not on file     Minutes per session: Not on file    Stress: Not on file   Relationships    Social connections     Talks on phone: Not on file     Gets together: Not on file     Attends Christian service: Not on file     Active member of club or organization: Not on file     Attends meetings of clubs or organizations: Not on file     Relationship status: Not on file    Intimate partner violence     Fear of current or ex partner: Not on file     Emotionally abused: Not on file     Physically abused: Not on file     Forced sexual activity: Not on file   Other Topics Concern    Not on file   Social History Narrative    Not on file       Visit Vitals  /83 (BP 1 Location: Left arm)   Pulse 63   Temp 97 °F (36.1 °C) (Temporal)   Resp 14   Ht 5' 6\" (1.676 m)   Wt 82.3 kg (181 lb 6.4 oz)   SpO2 100%   BMI 29.28 kg/m²         PHYSICAL EXAMINATION:  GENERAL: Alert and oriented x3, in no acute distress, well-developed, well-nourished, afebrile. HEART: No JVD. EYES: No scleral icterus   NECK: No significant lymphadenopathy   LUNGS: No respiratory compromise or indrawing  ABDOMEN: Soft, non-tender, non-distended. Electronically signed by:  Josefina Ford MD

## 2020-08-05 ENCOUNTER — HOSPITAL ENCOUNTER (OUTPATIENT)
Dept: PHYSICAL THERAPY | Age: 60
Discharge: HOME | End: 2020-08-05
Payer: COMMERCIAL

## 2020-08-05 PROCEDURE — 97110 THERAPEUTIC EXERCISES: CPT

## 2020-08-05 PROCEDURE — 97162 PT EVAL MOD COMPLEX 30 MIN: CPT

## 2020-08-05 NOTE — PROGRESS NOTES
PT DAILY TREATMENT NOTE/LUMBAR EVAL 10-18    Patient Name: Alfonso Olivo  Date:2020  : 1960  [x]  Patient  Verified  Payor: BLUE CROSS / Plan: [address] [place] [address] / Product Type: PPO /    In time:9:52  Out time:10: 32  Total Treatment Time (min):40  Visit #: 1 of 12    Medicare/BCBS Only   Total Timed Codes (min):  23 1:1 Treatment Time:  40     Treatment Area: Right knee pain [M25.561]  SUBJECTIVE  Pain Level (0-10 scale): 5/10   []constant []intermittent []improving []worsening []no change since onset    Any medication changes, allergies to medications, adverse drug reactions, diagnosis change, or new procedure performed?: [x] No    [] Yes (see summary sheet for update)  Subjective functional status/changes:       Pt is a 62 yo F who presents with right knee pain. She reports a history of right TKA 2 years ago and revision 2019. She had therapy initially following revision but reports exacerbation of symptoms within the past 6 months. Currently pain is aggravated with standing 5 minutes. She also complains of dizziness for the past two weeks, but she saw her MD and began medication, and the dizziness has resolved. She worried about her balance prior to onset of dizziness, but she denies falls. She denies any LBP or tingling/numbness in BLE. Her MD is ordering an MRI of her l/s to determine if knee pain is originating from l/s. MRI is scheduled for 20.     Barriers: []pain []financial []time []transportation []other  Motivation: high   Substance use: []Alcohol []Tobacco []other:   FABQ Score: []low []elevate  Cognition: A & O x 4    Other:    OBJECTIVE/EXAMINATION    17 min [x]Eval                  []Re-Eval       23 min Therapeutic Exercise:  [x] See flow sheet : see HEP    Rationale: increase ROM and increase strength to improve the patients ability to improve ease of prolonged standing and daily tasks          With   [x] TE   [] TA   [] neuro   [] other: Patient Education: [x] Review HEP    [] Progressed/Changed HEP based on:   [] positioning   [] body mechanics   [] transfers   [] heat/ice application    [x] other: reviewed findings of evaluation, new therex technique and purpose, purpose of therapy, and therapy plan in order to ensure pt understanding/compliance with therapy       Other Objective/Functional Measures:     /84 taken manually prior to therapy     Physical Therapy Evaluation - Lumbar Spine ([facility])    SUBJECTIVE     General Health:  Red Flags Indicated? [] Yes    [] No  [] Yes [x] No Recent weight change (If yes, due to dieting?  [] Yes  [] No)   [x] Yes [] No Weakness in legs during walking - right knee pain   [x] Yes [] No Unremitting pain at night  [] Yes [x] No Abdominal pain or problems  [] Yes [x] No Rectal bleeding  [] Yes [x] No Feet more cold or painful in cold weather  [] Yes [x] No Blood or pain with urination  [] Yes [x] No Dysfunction of bowel or bladder  [] Yes [x] No Recent illness within past 3 weeks (i.e, cold, flu)  [] Yes [x] No Numbness/tingling in buttock/genitalia region    Past History/Treatments:     Diagnostic Tests: Lumbar MRI scheduled for 8/11/20 to rule out radiculopathy     OBJECTIVE    Active Movements: [] N/A   [] Too acute   [] Other:  ROM % AROM Comments:pain, area   Forward flexion 40-60 75 Pain in right knee   Extension 20-30 75 Pain in right knee   SB right 20-30 50 Pain in right knee   SB left 20-30 50 Pain in right knee      Knee AROM  Left +2 to 129*  Right 0-110*    Right knee AAROM flexion following heel slides: 110*     Repeated Movements   No change in pain with repeated movement screen all directions    Neuro Screen [] WNL  Dermatomes: intact to light touch BLE     Dural Mobility:  SLR    Supine: [x] R    [x] L    [] +    [x] -  @ (degrees):      Palpation  [] Min  [] Mod  [] Severe    Location: TTP right knee medial/lateral joint line, patella, infrapatellar fat pads, hamstrings tendons, and patellar tendon     Strength   L(0-5) R (0-5) N/T   Hip Flexion (L1,2) 4+ 4 []   Knee Extension (L3,4) 4+ 4 []   Ankle Dorsiflexion (L4) 4 4 []   Knee Flexion (S1,2) 4+ 4- []   Hip IR 4+ 4+ []   Hip ER 4+ 4+ []   Hip Abduction 3+ 3+ []   Hip Extension  4- 4- []     Special Tests         Hip: Piriformis: [x] R    [x] L    [] +    [x] -     Other tests/comments:  Patella hypomobile B    Completed 10-15 reps of s/l hip abduction, clams, reverse clams, heel slides, prone eccentric hamstring curl, prone hip extension, SAQ with towel between knees      Attempted prone hip extension with knee flexed but pt reported pain, ceased and pain subsided immediately upon stopping  Requested to stop SLR flexion after 8 reps d/t pain, pain subsided immediately upon ceasing    Pt reported feeling better following session but reported pain level remained 5/10     Pain Level (0-10 scale) post treatment: 5/10    ASSESSMENT/Changes in Function: See POC    Patient will continue to benefit from skilled PT services to modify and progress therapeutic interventions, address functional mobility deficits, address ROM deficits, address strength deficits, analyze and address soft tissue restrictions, analyze and cue movement patterns, analyze and modify body mechanics/ergonomics, assess and modify postural abnormalities, address imbalance/dizziness and instruct in home and community integration to attain remaining goals.      [x]  See Plan of Care  []  See progress note/recertification  []  See Discharge Summary         Progress towards goals / Updated goals:  See POC    PLAN  [x]  Upgrade activities as tolerated     []  Continue plan of care  [x]  Update interventions per flow sheet       []  Discharge due to:_  []  Other:_      Radhika Knight, PT 8/5/2020  10:00 AM

## 2020-08-05 NOTE — PROGRESS NOTES
[facility]  [employer] [facility]  [address] [place] [address]  [phone] fax    Plan of Care/ Statement of Necessity for Physical Therapy Services    Patient name: Senait Torres Start of Care: 2020   Referral source: Antelmo Vasquez MD  : 1960    Medical Diagnosis: Right knee pain [M25.561]  Payor: BLUE CROSS / Plan: [address] [place] [address] / Product Type: PPO /  Onset Date: 2019, exacerbation within the past 6 months    Treatment Diagnosis: Right Knee Pain    Prior Hospitalization: see medical history Provider#: [id number]   Medications: Verified on Patient summary List    Comorbidities: diabetes, HTN, arthritis    Prior Level of Function: walks 20-25 minutes daily, 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 Assessment/ key information: Pt is a 62 yo F who presents with right knee pain. She reports a history of right TKA 2 years ago and revision 2019. She had therapy initially following revision but reports exacerbation of symptoms within the past 6 months. Currently pain is aggravated with standing 5 minutes. She also complains of dizziness for the past two weeks, but she saw her MD and began medication, and the dizziness has resolved. She worried about her balance prior to onset of dizziness, but she denies falls. She also complains of limitations with stairs d/t right knee pain and SOB. Denies SOB with any other activities. Pt is TTP right knee medial/lateral joint line, patella, infrapatellar fat pads, hamstrings tendons, and patellar tendon. Right hip and knee strength are decreased, partially limited by pain, and valgus collapse is evident. Right knee flexion AROM is also limited d/t pain. Lumbar repeated movement screen is WNL, and dermatomes are intact to light touch BLE.   Balance was not assessed this visit d/t time constraint, but will plan to assess and address balance impairments in therapy d/t pt's fear of falling. Pt will benefit from skilled PT to address strength, flexibility, balance, and postural deficits in order to reduce pain with daily tasks and improve QOL. Evaluation Complexity History HIGH Complexity :3+ comorbidities / personal factors will impact the outcome/ POC ; Examination HIGH Complexity : 4+ Standardized tests and measures addressing body structure, function, activity limitation and / or participation in recreation  ;Presentation MEDIUM Complexity : Evolving with changing characteristics  ; Clinical Decision Making MEDIUM Complexity : FOTO score of 26-74  Overall Complexity Rating: MEDIUM  Problem List: pain affecting function, decrease ROM, decrease strength, impaired gait/ balance, decrease ADL/ functional abilitiies, decrease activity tolerance and decrease flexibility/ joint mobility   Treatment Plan may include any combination of the following: Therapeutic exercise, Therapeutic activities, Neuromuscular re-education, Physical agent/modality, Gait/balance training, Manual therapy, Patient education, Self Care training, Functional mobility training, Home safety training and Stair training  Patient / Family readiness to learn indicated by: asking questions, trying to perform skills and interest  Persons(s) to be included in education: patient (P)  Barriers to Learning/Limitations: None  Patient Goal (s): less pain  Patient Self Reported Health Status: good  Rehabilitation Potential: good    Short Term Goals: To be accomplished in 1 weeks:  1. Pt will be compliant with initial HEP in order to optimize therapy outcomes   Long Term Goals: To be accomplished in 4 weeks:  1. Pt will improve FOTO by 23 points in order to demonstrate functional improvement. 2.  P will report 50% overall improvement in symptoms since start of care in order to improve QOL.   3.  Pt will improve right knee flexion AROM to 120* in order to improve ease of deep sitting and stairs. 4.  Pt will improve right knee strength to 4+/5 in order to improve ease of prolonged ambulation and stairs. 5.  Pt will improve B hip abduction/extension strength to 4/5 in order to improve kinetic chain alignment for increased standing/ambulatory tolerance. 6.  Pt will improve standing tolerance to 10 minutes with minimal pain increase in order to improve ease of ADLs and household management. Frequency / Duration: Patient to be seen 2-3 times per week for 4 weeks. Patient/ CarPatient/ Caregiver education and instruction: Diagnosis, prognosis, exercises   [x]  Plan of care has been reviewed with NEHA Espinoza PT 8/5/2020 9:53 AM    ________________________________________________________________________    I certify that the above Therapy Services are being furnished while the patient is under my care. I agree with the treatment plan and certify that this therapy is necessary.     Physician's Signature:____________Date:_________TIME:________    ** Signature, Date and Time must be completed for valid certification **    Please sign and return to [facility]  [facility]  [facility]  [address] [place] [address]  [phone] fax

## 2020-08-12 ENCOUNTER — HOSPITAL ENCOUNTER (OUTPATIENT)
Dept: PHYSICAL THERAPY | Age: 60
Discharge: HOME | End: 2020-08-12
Payer: COMMERCIAL

## 2020-08-12 PROCEDURE — 97140 MANUAL THERAPY 1/> REGIONS: CPT

## 2020-08-12 PROCEDURE — 97110 THERAPEUTIC EXERCISES: CPT

## 2020-08-12 NOTE — PROGRESS NOTES
PT DAILY TREATMENT NOTE 10-18    Patient Name: Iban Alaniz  Date:2020  : 1960  [x]  Patient  Verified  Payor: [employer] [place] / Plan: [address] [place] [address] / Product Type: PPO /    In time:9:45  Out time:10:26  Total Treatment Time (min): 41  Visit #: 2 of 12    Medicare/BCBS Only   Total Timed Codes (min):  41 1:1 Treatment Time:  39       Treatment Area: Right knee pain [M25.561]    SUBJECTIVE  Pain Level (0-10 scale): 5/10   Any medication changes, allergies to medications, adverse drug reactions, diagnosis change, or new procedure performed?: [x] No    [] Yes (see summary sheet for update)  Subjective functional status/changes:   [] No changes reported  Pt reports that she did a lot of exercises yesterday and walked a lot, and she thinks she over-did it. She is doing her HEP. She also tried heel slides on her own but they hurt.      OBJECTIVE    32 min Therapeutic Exercise:  [x] See flow sheet :   Rationale: increase ROM and increase strength to improve the patients ability to improve ease of ambulation and daily tasks     9 min Manual Therapy:  Grade 3-4 superior/inferior and lateral patellar mobs, TPR right vastus lateralis, vastus medialis, rectus femoris, biceps femoris, and semimembranosus     Rationale: decrease pain, increase ROM, increase tissue extensibility and decrease trigger points to improve ease of ambulation and ADLs            With   [] TE   [] TA   [] neuro   [] other: Patient Education: [x] Review HEP    [] Progressed/Changed HEP based on:   [] positioning   [] body mechanics   [] transfers   [] heat/ice application    [x] other: reviewed 3 systems of balance, reviewed new therex technique and purpose        Other Objective/Functional Measures:     Multiple trigger points throughout right quads and hamstrings, addressed manually  Greatest tenderness and trigger points in right biceps femoris   Educated pt to perform self DTM/TPR over right quad/hamstrings 3-5 minutes per night with tennis ball or lacrosse ball    No LOB with Romberg Foam EO/EC  Required tactile cues for setup to reduce TFL substitution with sidelying hip abduction  Discomfort with SLR ER and sidelying hip abduction but pt requested to complete reps   Reduced pain following session      Pain Level (0-10 scale) post treatment: 4/10     ASSESSMENT/Changes in Function:     Initiated treatment per POC. Pt was motivated in therapy and put forth good effort with interventions. She presents with multiple trigger points throughout right hamstring/quad musculature, likely with kinetic chain alignment deficits contributing. Educated patient regarding self DTM/TPR in order to assist with independent management of symptoms. Will continue to address strength, ROM, flexibility, posture, and balance deficits in order to reduce pain with daily tasks and improve QOL. Patient will continue to benefit from skilled PT services to modify and progress therapeutic interventions, address functional mobility deficits, address ROM deficits, address strength deficits, analyze and address soft tissue restrictions, analyze and cue movement patterns, analyze and modify body mechanics/ergonomics, assess and modify postural abnormalities, address imbalance/dizziness and instruct in home and community integration to attain remaining goals. Progress towards goals / Updated goals:  Short Term Goals: To be accomplished in 1 weeks:  1. Pt will be compliant with initial HEP in order to optimize therapy outcomes Goal met. 8/12/20   Long Term Goals: To be accomplished in 4 weeks:  1. Pt will improve FOTO by 23 points in order to demonstrate functional improvement. 2.  P will report 50% overall improvement in symptoms since start of care in order to improve QOL. 3.  Pt will improve right knee flexion AROM to 120* in order to improve ease of deep sitting and stairs.   4.  Pt will improve right knee strength to 4+/5 in order to improve ease of prolonged ambulation and stairs. 5.  Pt will improve B hip abduction/extension strength to 4/5 in order to improve kinetic chain alignment for increased standing/ambulatory tolerance. 6.  Pt will improve standing tolerance to 10 minutes with minimal pain increase in order to improve ease of ADLs and household management.       PLAN  [x]  Upgrade activities as tolerated     [x]  Continue plan of care  []  Update interventions per flow sheet       []  Discharge due to:_  []  Other:_      Adriane Cowden, PT 8/12/2020  9:48 AM    Future Appointments   Date Time Provider Jean Paul Louis   8/13/2020 10:30 AM Raya Macias, PTA [facility]   8/19/2020 12:00 PM Raya Macias, PTA [facility]   8/19/2020  6:15 PM [facility] MÓNICA RM 3 3D [facility]   8/20/2020 12:45 PM Raya Macias, PTA [facility]   8/21/2020  7:45 AM [facility] MRI RM 1 [facility]   8/24/2020  8:15 AM Raya Macias, PTA [facility]   8/26/2020  8:15 AM Cesar Lazo, PT [facility]   8/31/2020  8:15 AM Raya Macias, PTA [facility]   9/1/2020  8:15 AM Cesar Lazo, PT [facility]   9/2/2020  8:30 AM Bill Conner MD [address] [place] [address]

## 2020-08-13 ENCOUNTER — HOSPITAL ENCOUNTER (OUTPATIENT)
Dept: PHYSICAL THERAPY | Age: 60
Discharge: HOME | End: 2020-08-13
Payer: COMMERCIAL

## 2020-08-13 PROCEDURE — 97140 MANUAL THERAPY 1/> REGIONS: CPT

## 2020-08-13 PROCEDURE — 97110 THERAPEUTIC EXERCISES: CPT

## 2020-08-13 NOTE — PROGRESS NOTES
PT DAILY TREATMENT NOTE 10-18    Patient Name: Jc Mary  Date:2020  : 1960  [x]  Patient  Verified  Payor: BLUE CROSS / Plan: [address] [place] [address] / Product Type: PPO /    In time:1030  Out time:1108  Total Treatment Time (min): 38  Visit #: 3 of 12    Medicare/BCBS Only   Total Timed Codes (min):  38 1:1 Treatment Time:  38       Treatment Area: Right knee pain [M25.561]    SUBJECTIVE  Pain Level (0-10 scale): 4/10  Any medication changes, allergies to medications, adverse drug reactions, diagnosis change, or new procedure performed?: [x] No    [] Yes (see summary sheet for update)  Subjective functional status/changes:   [] No changes reported  Pt stated that she has a lot of soreness today    OBJECTIVE    30 min Therapeutic Exercise:  [x] See flow sheet :   Rationale: increase ROM and increase strength to improve the patients ability to increase ease with ADLs    8 min Manual Therapy:  TPR right vastus lateralis, vastus medialis, rectus femoris, biceps femoris   Rationale: decrease pain, increase ROM and increase tissue extensibility to increase ease with household tasks    With   [x] TE   [] TA   [] neuro   [] other: Patient Education: [x] Review HEP    [] Progressed/Changed HEP based on:   [] positioning   [] body mechanics   [] transfers   [] heat/ice application    [] other:      Other Objective/Functional Measures:   Pt has 3 palpable masses in her right lateral thigh  Pt was instructed to call MD if they get painful or increase in size  No complaint of increased pain during session   No LOB with static balance     Pain Level (0-10 scale) post treatment: 4/10    ASSESSMENT/Changes in Function:   Pt is making slow progress toward goals. Pt cont with moderate pain in the right knee. Cont with decreased range of motion and strength in right LE.  Cont to have slight limp with ambulation    Patient will continue to benefit from skilled PT services to modify and progress therapeutic interventions, address functional mobility deficits, address ROM deficits, address strength deficits, analyze and address soft tissue restrictions, analyze and cue movement patterns, analyze and modify body mechanics/ergonomics and instruct in home and community integration to attain remaining goals. [x]  See Plan of Care  []  See progress note/recertification  []  See Discharge Summary         Progress towards goals / Updated goals:  Short Term Goals: To be accomplished in 1 weeks:  1.  Pt will be compliant with initial HEP in order to optimize therapy outcomes   Goal met. 8/12/20     Long Term Goals: To be accomplished in 4 weeks:  1.  Pt will improve FOTO by 23 points in order to demonstrate functional improvement. 2.  P will report 50% overall improvement in symptoms since start of care in order to improve QOL. 3.  Pt will improve right knee flexion AROM to 120* in order to improve ease of deep sitting and stairs. 4.  Pt will improve right knee strength to 4+/5 in order to improve ease of prolonged ambulation and stairs. 5.  Pt will improve B hip abduction/extension strength to 4/5 in order to improve kinetic chain alignment for increased standing/ambulatory tolerance.   6.  Pt will improve standing tolerance to 10 minutes with minimal pain increase in order to improve ease of ADLs and household management.      PLAN  []  Upgrade activities as tolerated     [x]  Continue plan of care  []  Update interventions per flow sheet       []  Discharge due to:_  []  Other:_      Kuldip Ling PTA 8/13/2020  9:55 AM    Future Appointments   Date Time Provider Jean Paul Louis   8/13/2020 10:30 AM Maurice Silva PTA [facility]   8/19/2020 12:00 PM Maurice Silva PTA [facility]   8/19/2020  6:15 PM HBV MÓNICA RM 3 3D [facility]   8/20/2020 12:45 PM Maurice Silva, PTA [facility]   8/21/2020  7:45 AM [facility] MRI RM 1 MMCR[facility]   8/24/2020  8:15 AM Maurice Silva PTA [facility]   8/26/2020 8:15 AM Natalie Sexton, PT [facility]   8/31/2020  8:15 AM Leita Score [facility]   9/1/2020  8:15 AM Natalie Sexton, PT [facility]   9/2/2020  8:30 AM Radha Casper, [place] [id number]

## 2020-08-19 ENCOUNTER — APPOINTMENT (OUTPATIENT)
Dept: PHYSICAL THERAPY | Age: 60
End: 2020-08-19
Payer: COMMERCIAL

## 2020-08-20 ENCOUNTER — HOSPITAL ENCOUNTER (OUTPATIENT)
Dept: PHYSICAL THERAPY | Age: 60
Discharge: HOME | End: 2020-08-20
Payer: COMMERCIAL

## 2020-08-20 PROCEDURE — 97140 MANUAL THERAPY 1/> REGIONS: CPT

## 2020-08-20 PROCEDURE — 97110 THERAPEUTIC EXERCISES: CPT

## 2020-08-20 NOTE — PROGRESS NOTES
PT DAILY TREATMENT NOTE 10-18    Patient Name: Naomi Kumar  Date:2020  : 1960  [x]  Patient  Verified  Payor: [employer] [place] / Plan: [address] [place] [address] / Product Type: PPO /    In time:1245  Out time:123  Total Treatment Time (min): 38  Visit #: 4 of 12    Medicare/BCBS Only   Total Timed Codes (min):  38 1:1 Treatment Time:  38       Treatment Area: Right knee pain [M25.561]    SUBJECTIVE  Pain Level (0-10 scale): 10  Any medication changes, allergies to medications, adverse drug reactions, diagnosis change, or new procedure performed?: [x] No    [] Yes (see summary sheet for update)  Subjective functional status/changes:   [] No changes reported  Pt stated that she still has some dizziness from her sugar being messed up    OBJECTIVE    30 min Therapeutic Exercise:  [x] See flow sheet :   Rationale: increase ROM and increase strength to improve the patients ability to increase ease with ADLs    8 min Manual Therapy:  DTM to right HS and ITB   Rationale: decrease pain, increase ROM and increase tissue extensibility to improve walking tolerance      With   [x] TE   [] TA   [] neuro   [] other: Patient Education: [x] Review HEP    [] Progressed/Changed HEP based on:   [] positioning   [] body mechanics   [] transfers   [] heat/ice application    [] other:      Other Objective/Functional Measures:   No static balance today due to high sugar level causing dizziness  Had no complaint of increased right knee pain during session   No complaint of increased dizziness during session     Pain Level (0-10 scale) post treatment: 1/10    ASSESSMENT/Changes in Function:   Pt is slowly progressing toward goals. Cont with decreased strength and range of motion in the right knee. Cont with pain behind the knee and thigh. Standing tolerance is improving.     Patient will continue to benefit from skilled PT services to modify and progress therapeutic interventions, address functional mobility deficits, address ROM deficits, address strength deficits, analyze and cue movement patterns, analyze and modify body mechanics/ergonomics, address imbalance/dizziness and instruct in home and community integration to attain remaining goals. [x]  See Plan of Care  []  See progress note/recertification  []  See Discharge Summary         Progress towards goals / Updated goals:  Short Term Goals: To be accomplished in 1 weeks:  1.  Pt will be compliant with initial HEP in order to optimize therapy outcomes   Goal met. 8/12/20      Long Term Goals: To be accomplished in 4 weeks:  1.  Pt will improve FOTO by 23 points in order to demonstrate functional improvement. 2.  P will report 50% overall improvement in symptoms since start of care in order to improve QOL. 3.  Pt will improve right knee flexion AROM to 120* in order to improve ease of deep sitting and stairs. 4.  Pt will improve right knee strength to 4+/5 in order to improve ease of prolonged ambulation and stairs. 5.  Pt will improve B hip abduction/extension strength to 4/5 in order to improve kinetic chain alignment for increased standing/ambulatory tolerance.   6.  Pt will improve standing tolerance to 10 minutes with minimal pain increase in order to improve ease of ADLs and household management.      PLAN  []  Upgrade activities as tolerated     [x]  Continue plan of care  []  Update interventions per flow sheet       []  Discharge due to:_  []  Other:_      Robert Melendrez PTA 8/20/2020  11:19 AM    Future Appointments   Date Time Provider Jean Paul Louis   8/20/2020 12:45 PM [facility]   8/21/2020  7:45 AM [facility] MRI RM 1 [facility]   8/24/2020  8:15 AM Onita Null, PTA [facility]   8/26/2020  8:15 AM Blima Grit, PT [id number]   8/31/2020  8:15 AM Onita Null, PTA [facility]   9/1/2020  8:15 AM Blima Grit, PT [facility]   9/2/2020  8:30 AM MD [place] [id number]

## 2020-08-21 ENCOUNTER — HOSPITAL ENCOUNTER (OUTPATIENT)
Dept: MRI IMAGING | Age: 60
Discharge: HOME | End: 2020-08-21
Attending: ORTHOPAEDIC SURGERY
Payer: COMMERCIAL

## 2020-08-21 DIAGNOSIS — M54.50 LUMBAR PAIN: ICD-10-CM

## 2020-08-21 PROCEDURE — 72148 MRI LUMBAR SPINE W/O DYE: CPT

## 2020-08-24 ENCOUNTER — HOSPITAL ENCOUNTER (OUTPATIENT)
Dept: PHYSICAL THERAPY | Age: 60
Discharge: HOME | End: 2020-08-24
Payer: COMMERCIAL

## 2020-08-24 PROCEDURE — 97110 THERAPEUTIC EXERCISES: CPT

## 2020-08-24 PROCEDURE — 97140 MANUAL THERAPY 1/> REGIONS: CPT

## 2020-08-24 NOTE — PROGRESS NOTES
PT DAILY TREATMENT NOTE 10-18    Patient Name: Kristen Ashton  Date:2020  : 1960  [x]  Patient  Verified  Payor: BLUE CROSS / Plan: [address] [place] [address] / Product Type: PPO /    In time:815  Out time:853  Total Treatment Time (min): 38  Visit #: 5 of 12    Medicare/BCBS Only   Total Timed Codes (min):  38 1:1 Treatment Time:  38       Treatment Area: Right knee pain [M25.561]    SUBJECTIVE  Pain Level (0-10 scale): 3/10  Any medication changes, allergies to medications, adverse drug reactions, diagnosis change, or new procedure performed?: [x] No    [] Yes (see summary sheet for update)  Subjective functional status/changes:   [] No changes reported  Pt stated that she is doing so well today that she walked to therapy instead of driving    OBJECTIVE    30 min Therapeutic Exercise:  [x] See flow sheet :   Rationale: increase ROM and increase strength to improve the patients ability to increase ease with ADLs    8 min Manual Therapy:  DTM to right HS and patella mobs   Rationale: decrease pain, increase ROM and increase tissue extensibility to increase ease with ADLs    With   [x] TE   [] TA   [] neuro   [] other: Patient Education: [x] Review HEP    [] Progressed/Changed HEP based on:   [] positioning   [] body mechanics   [] transfers   [] heat/ice application    [] other:      Other Objective/Functional Measures:   Had no difficulty with increases made today  No complaint of increased pain during session   No LOB with EC MSR bilaterally     Pain Level (0-10 scale) post treatment: 1/10    ASSESSMENT/Changes in Function:   Pt is slowly progressing toward goals. Strength and range of motion are slowly improving. Pt reports slow increase in standing tolerance.  Walking tolerance is improving as pt walked to therapy today    Patient will continue to benefit from skilled PT services to modify and progress therapeutic interventions, address functional mobility deficits, address ROM deficits, address strength deficits, analyze and address soft tissue restrictions, analyze and cue movement patterns, assess and modify postural abnormalities, address imbalance/dizziness and instruct in home and community integration to attain remaining goals. [x]  See Plan of Care  []  See progress note/recertification  []  See Discharge Summary         Progress towards goals / Updated goals:  Short Term Goals: To be accomplished in 1 weeks:  1.  Pt will be compliant with initial HEP in order to optimize therapy outcomes   Goal met. 8/12/20      Long Term Goals: To be accomplished in 4 weeks:  1.  Pt will improve FOTO by 23 points in order to demonstrate functional improvement. 2.  P will report 50% overall improvement in symptoms since start of care in order to improve QOL. 3.  Pt will improve right knee flexion AROM to 120* in order to improve ease of deep sitting and stairs. 4.  Pt will improve right knee strength to 4+/5 in order to improve ease of prolonged ambulation and stairs. 5.  Pt will improve B hip abduction/extension strength to 4/5 in order to improve kinetic chain alignment for increased standing/ambulatory tolerance. 6.  Pt will improve standing tolerance to 10 minutes with minimal pain increase in order to improve ease of ADLs and household management.    Progressing. Pt reports ability to stand for 5-10 minutes before experiencing increased pain and fatigue.  8/24/20    PLAN  []  Upgrade activities as tolerated     [x]  Continue plan of care  []  Update interventions per flow sheet       []  Discharge due to:_  []  Other:_      [place] Dibbles, PTA 8/24/2020  7:15 AM    Future Appointments   Date Time Provider Jean Paul Louis   8/24/2020  8:15 AM Onita Null, PTA [facility]   8/26/2020  8:15 AM Blima Grit, PT [facility]DAY[facility]   8/31/2020  8:15 AM Onita Null, PTA [facility]   9/1/2020  8:15 AM Blima Grit, PT [facility]   9/2/2020  8:30 AM [facility] Jang [facility]

## 2020-08-26 ENCOUNTER — HOSPITAL ENCOUNTER (OUTPATIENT)
Dept: PHYSICAL THERAPY | Age: 60
Discharge: HOME | End: 2020-08-26
Payer: COMMERCIAL

## 2020-08-26 PROCEDURE — 97110 THERAPEUTIC EXERCISES: CPT

## 2020-08-26 PROCEDURE — 97140 MANUAL THERAPY 1/> REGIONS: CPT

## 2020-08-26 NOTE — PROGRESS NOTES
PT DAILY TREATMENT NOTE 10-18    Patient Name: Jc Mary  Date:2020  : 1960  [x]  Patient  Verified  Payor: BLUE CROSS / Plan: [address] [place] [address] / Product Type: PPO /    In time:8:15  Out time:8:57  Total Treatment Time (min): 42  Visit #: 6 of 12    Medicare/BCBS Only   Total Timed Codes (min):  42 1:1 Treatment Time: 39       Treatment Area: Right knee pain [M25.561]    SUBJECTIVE  Pain Level (0-10 scale): 4/10   Any medication changes, allergies to medications, adverse drug reactions, diagnosis change, or new procedure performed?: [x] No    [] Yes (see summary sheet for update)  Subjective functional status/changes:   [] No changes reported  Pt reports that her knee is very sore when she first wakes up. Sometimes the soreness decreases as the day progresses and sometimes it doesn't. Pt reports her knee still hurts when she's doing things, but she just tries to pretend it's not there. Pt reports 90% overall improvement since start of care. Her pain is typically over medial knee joint line and over quads (over middle mass in quads upon palpation and further questioning).   She follows up with Dr. Jimena Cunningham on 20       OBJECTIVE      34 min Therapeutic Exercise:  [x] See flow sheet :   Rationale: increase ROM and increase strength to improve the patients ability to improve ease of stairs and prolonged standing/ambulation with household management     8 min Manual Therapy:  TPR right vastus medialis, rectus femoris, semimembranosus; grade 3 inferior patellar mobs to regain flexion    Rationale: decrease pain, increase ROM, increase tissue extensibility and decrease trigger points to improve activity tolerance and ease of daily tasks             With   [] TE   [] TA   [] neuro   [] other: Patient Education: [x] Review HEP    [] Progressed/Changed HEP based on:   [] positioning   [] body mechanics   [] transfers   [] heat/ice application    [] other:      Other Objective/Functional Measures:     Right Knee AROM pre-manual:  +2 to 106*  Knee flexion AROM following manual: 104*, soreness reported     TTP over middle mass in right thigh, palpation referred pain to posterior thigh, and pt reports she experiences pain here frequently  Avoided masses during manual interventions  Instructed pt to discuss masses in right thigh with MD at follow up on 9/2/20  Sent a staff [facility] him of masses and reproduction of pt's pain with palpation      Discomfort with SLR with ER and with clams on right, discomfort subsided immediately upon lowering   Reduced pain following session     Pain Level (0-10 scale) post treatment: 1-2/10    ASSESSMENT/Changes in Function:     Pt is making slow, steady progress towards initial goals in therapy. She continues to report pain with most activity and decreased standing tolerance; however, pain levels are decreasing with these tasks per subjective reports. She reports 90% overall improvement in symptoms since start of care. Knee flexion does not appear to be improving with therapy interventions, and palpable masses may be affecting quad mobility and contributing to continued knee flexion deficits. Will continue to address strength, ROM, flexibility, balance, and functional mobility deficits in order to improve ease of standing/ambultion with household management and daily tasks. Patient will continue to benefit from skilled PT services to modify and progress therapeutic interventions, address functional mobility deficits, address ROM deficits, address strength deficits, analyze and address soft tissue restrictions, analyze and cue movement patterns, analyze and modify body mechanics/ergonomics, assess and modify postural abnormalities, address imbalance/dizziness and instruct in home and community integration to attain remaining goals.          Progress towards goals / Updated goals:  Short Term Goals: To be accomplished in 1 weeks:  1.  Pt will be compliant with initial HEP in order to optimize therapy outcomes   Goal met. 8/12/20      Long Term Goals: To be accomplished in 4 weeks:  1.  Pt will improve FOTO by 23 points in order to demonstrate functional improvement. 2.  P will report 50% overall improvement in symptoms since start of care in order to improve QOL. Goal met. Pt reports 90% improvement with therapy 8/26/20   3.  Pt will improve right knee flexion AROM to 120* in order to improve ease of deep sitting and stairs. Not met. 106* (110* at eval) 8/26/20   4.  Pt will improve right knee strength to 4+/5 in order to improve ease of prolonged ambulation and stairs. 5.  Pt will improve B hip abduction/extension strength to 4/5 in order to improve kinetic chain alignment for increased standing/ambulatory tolerance. 6.  Pt will improve standing tolerance to 10 minutes with minimal pain increase in order to improve ease of ADLs and household management.    Progressing. Pt reports ability to stand for 5-10 minutes before experiencing increased pain and fatigue.  8/24/20       PLAN  [x]  Upgrade activities as tolerated     [x]  Continue plan of care  []  Update interventions per flow sheet       []  Discharge due to:_  []  Other:_      Merari Osman, PT 8/26/2020  8:20 AM    Future Appointments   Date Time Provider Jean Paul Louis   8/31/2020  8:15 AM Bushra Rubio PTA [facility]   9/1/2020  8:15 AM Kris Cotton PT [id number] [facility]   9/2/2020  8:30 AM MD Tea Bhatia 75

## 2020-08-31 ENCOUNTER — HOSPITAL ENCOUNTER (OUTPATIENT)
Dept: PHYSICAL THERAPY | Age: 60
Discharge: HOME | End: 2020-08-31
Payer: COMMERCIAL

## 2020-08-31 PROCEDURE — 97140 MANUAL THERAPY 1/> REGIONS: CPT

## 2020-08-31 PROCEDURE — 97110 THERAPEUTIC EXERCISES: CPT

## 2020-08-31 NOTE — PROGRESS NOTES
PT DAILY TREATMENT NOTE 10-18    Patient Name: Cricket Anne  Date:2020  : 1960  [x]  Patient  Verified  Payor: BLUE CROSS / Plan: [address] [place] [address] / Product Type: PPO /    In time:815  Out time:853  Total Treatment Time (min): 38  Visit #: 7 of 12    Medicare/BCBS Only   Total Timed Codes (min):  38 1:1 Treatment Time:  38       Treatment Area: Right knee pain [M25.561]    SUBJECTIVE  Pain Level (0-10 scale): 0/10  Any medication changes, allergies to medications, adverse drug reactions, diagnosis change, or new procedure performed?: [x] No    [] Yes (see summary sheet for update)  Subjective functional status/changes:   [] No changes reported  Pt stated that she just has some soreness today and not really pain    OBJECTIVE    30 min Therapeutic Exercise:  [x] See flow sheet :   Rationale: increase ROM and increase strength to improve the patients ability to increase ease with ADLs    8 min Manual Therapy:  DTM to right HS and patella mobs   Rationale: decrease pain, increase ROM and increase tissue extensibility to increase ease with ADLs    With   [x] TE   [] TA   [] neuro   [] other: Patient Education: [x] Review HEP    [] Progressed/Changed HEP based on:   [] positioning   [] body mechanics   [] transfers   [] heat/ice application    [] other:      Other Objective/Functional Measures:   Had no difficulty with exercises  No complaint of increased pain during session   No LOB with MSR bilaterally     Pain Level (0-10 scale) post treatment: 0/10    ASSESSMENT/Changes in Function:   Pt is slowly progressing toward goals. Pt cont with soreness in the right knee. Cont with decreased standing and walking tolerance.  Strength in right knee and hip is improving    Patient will continue to benefit from skilled PT services to modify and progress therapeutic interventions, address functional mobility deficits, address ROM deficits, address strength deficits, analyze and address soft tissue restrictions, analyze and cue movement patterns, analyze and modify body mechanics/ergonomics and instruct in home and community integration to attain remaining goals. [x]  See Plan of Care  []  See progress note/recertification  []  See Discharge Summary         Progress towards goals / Updated goals:  Short Term Goals: To be accomplished in 1 weeks:  1.  Pt will be compliant with initial HEP in order to optimize therapy outcomes   Goal met. 8/12/20      Long Term Goals: To be accomplished in 4 weeks:  1.  Pt will improve FOTO by 23 points in order to demonstrate functional improvement. 2.  P will report 50% overall improvement in symptoms since start of care in order to improve QOL. Goal met. Pt reports 90% improvement with therapy 8/26/20   3.  Pt will improve right knee flexion AROM to 120* in order to improve ease of deep sitting and stairs. Not met. 106* (110* at eval) 8/26/20   4.  Pt will improve right knee strength to 4+/5 in order to improve ease of prolonged ambulation and stairs. 5.  Pt will improve B hip abduction/extension strength to 4/5 in order to improve kinetic chain alignment for increased standing/ambulatory tolerance. 6.  Pt will improve standing tolerance to 10 minutes with minimal pain increase in order to improve ease of ADLs and household management.    Progressing. Pt reports ability to stand for 5-10 minutes before experiencing increased pain and fatigue.  8/24/20    PLAN  []  Upgrade activities as tolerated     [x]  Continue plan of care  []  Update interventions per flow sheet       []  Discharge due to:_  []  Other:_      Sade Ram PTA 8/31/2020  7:32 AM    Future Appointments   Date Time Provider Jean Paul Louis   8/31/2020  8:15 AM Krystal Reyna, PTA [facility]   9/1/2020  8:15 AM Jere Syed, PT [id number] [facility]   9/2/2020  8:30 AM [facility]

## 2020-09-01 ENCOUNTER — HOSPITAL ENCOUNTER (OUTPATIENT)
Dept: PHYSICAL THERAPY | Age: 60
Discharge: HOME | End: 2020-09-01
Payer: COMMERCIAL

## 2020-09-01 PROCEDURE — 97164 PT RE-EVAL EST PLAN CARE: CPT

## 2020-09-01 PROCEDURE — 97110 THERAPEUTIC EXERCISES: CPT

## 2020-09-01 NOTE — PROGRESS NOTES
[facility] [address]  [phone] fax    Physical Therapy Progress Note  Patient name: Morales Siegel Start of Care: 20   Referral source: Joel Mosley MD  : 1960   Medical/Treatment Diagnosis: Right knee pain [M25.561]  Payor: BLUE CROSS / Plan: [address] [place] [address] / Product Type: PPO /  Onset Date:  2019, exacerbation within the past 6 months     Treatment Diagnosis: Right Knee Pain           Prior Hospitalization: see medical history Provider#: [id number]   Medications: Verified on Patient Summary List    Comorbidities: diabetes, HTN, arthritis    Prior Level of Function: walks 20-25 minutes daily, 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, housekeeping                 Visits from Start of Care: 8    Missed Visits: 1    Established Goals:         Excellent           Good         Limited           None  [x] Increased ROM   []  []  [x]  []  [x] Increased Strength  []  [x]  [x]  []  [x] Increased Mobility  []  [x]  []  []   [x] Decreased Pain   []  [x]  []  []  [] Decreased Swelling  []  []  []  []    Key Functional Changes: improving ambulatory tolerance, improving standing tolerance, subjective reports of 90% improvement, continued limited knee flexion AROM, continued pain with knee flexion and decreased sleeping tolerance       Updated Goals: to be achieved in 4 weeks:  1. Pt will improve FOTO by 23 points in order to demonstrate functional improvement. 2.  Pt will improve right knee flexion AROM to 120* in order to improve ease of deep sitting and stairs. 3.  Pt will improve right knee flexion strength to 4+/5 in order to improve ease of prolonged ambulation and stairs. 4.  Pt will improve B hip abduction/extension strength to 4/5 in order to improve kinetic chain alignment for increased standing/ambulatory tolerance.   5.  Pt will report understanding/compliance with final HEP in order to allow for continued progression independently following DC.      ASSESSMENT/RECOMMENDATIONS:  Pt is making slow, steady progress in therapy, meeting 3/7 initial goals and progressing towards FOTO and knee strength goals. She reports increased standing tolerance and is now walking 1 mile per day. Right knee strength is improving, and pt reports 90% overall improvement since start of care. She reports significant reduction in pain levels; however, she continues to report soreness that is aggravated with bending her knee and limiting sleeping tolerance. Pt is waking 4-5x per night d/t pain. Knee flexion AROM has not improved since start of care, and palpable masses may be affecting quad mobility and contributing to continued knee flexion deficits and continued pain. She also continues to present with decreased glute strength B and valgus collapse in standing. Pt would like to continue rehab independently with HEP. She was given updated HEP this visit to address remaining deficits independently. Pending MD recommendation regarding continued therapy d/t unknown cause of palpable masses in right thigh that appear to be progressing.       []Continue therapy per initial plan/protocol at a frequency of  0-2 x per week for 4 weeks  []Continue therapy with the following recommended changes:_____________________      _____________________________________________________________________  []Discontinue therapy progressing towards or have reached established goals  []Discontinue therapy due to lack of appreciable progress towards goals  []Discontinue therapy due to lack of attendance or compliance  [x]Await Physician's recommendations/decisions regarding therapy  []Other:________________________________________________________________    Thank you for this referral.   Waqas Parker, PT 9/1/2020 8:47 AM    NOTE TO PHYSICIAN:  Macy Dowling 172   [facility]: [phone]  If you are unable to process this request in 24 hours please contact our office: [phone]    ? I have read the above report and request that my patient continue as recommended. ? I have read the above report and request that my patient continue therapy with the following changes/special instructions:____________________________________  ? I have read the above report and request that my patient be discharged from therapy.     Physicians signature: ______________________________Date: ______Time:______

## 2020-09-01 NOTE — PROGRESS NOTES
PT DAILY TREATMENT NOTE 10-18    Patient Name: Cleopatra Ku  Date:2020  : 1960  [x]  Patient  Verified  Payor: BLUE CROSS / Plan: [address] [place] [address] / Product Type: PPO /    In time:8:15  Out time:8:51  Total Treatment Time (min): 36  Visit #: 8 of 12    Medicare/BCBS Only   Total Timed Codes (min):  26 1:1 Treatment Time:  36       Treatment Area: Right knee pain [M25.561]    SUBJECTIVE  Pain Level (0-10 scale): 6/10 \"soreness\"   Any medication changes, allergies to medications, adverse drug reactions, diagnosis change, or new procedure performed?: [x] No    [] Yes (see summary sheet for update)  Subjective functional status/changes:   [] No changes reported  Pt reports 90% overall improvement since start of care. The pain is much better, but she still has a lot of soreness. The soreness is worse if she bends her knee and with trying to sleep at night. The soreness is waking her 4-5x per night.       OBJECTIVE    10 min []Eval                  [x]Re-Eval       26 min Therapeutic Exercise:  [x] See flow sheet :   Rationale: increase ROM and increase strength to improve the patients ability to improve standing and sleeping tolerance       With   [] TE   [] TA   [] neuro   [] other: Patient Education: [x] Review HEP    [] Progressed/Changed HEP based on:   [] positioning   [] body mechanics   [] transfers   [] heat/ice application    [] other:      Other Objective/Functional Measures:     Right Knee AROM: +4 to 109*  (0-110* at eval)     MMT  Strength    Left at Eval (0-5) Right At Eval (0-5) Left Current (0-5) Right Current (0-5)   Knee Extension (L3,4) 4+ 4 4+ 4+   Knee Flexion (S1,2) 4+ 4- 4+ 4   Hip Abduction 3+ 3+  3+   Hip Extension  4- 4- 4- 4-      Required cues to roll hips forward to reduce TFL substitution with sidelying hip abduction  Required cues to keep anterior hip on table to reduce trunk rotation compensation with prone hip extensoin    Reviewed initial HEP  Added prone quad stretch and SLR with ER to HEP  Gave pt green and orange TB for independent progression with HEP  Reviewed self DTM/TPR to quad and hamstrings, avoiding masses with DTM/TPR    FOTO 49    Pain Level (0-10 scale) post treatment: 0/10     ASSESSMENT/Changes in Function: See Progress Note     Patient will continue to benefit from skilled PT services to modify and progress therapeutic interventions, address functional mobility deficits, address ROM deficits, address strength deficits, analyze and address soft tissue restrictions, analyze and cue movement patterns, analyze and modify body mechanics/ergonomics, assess and modify postural abnormalities, address imbalance/dizziness and instruct in home and community integration to attain remaining goals. []  See Plan of Care  [x]  See progress note/recertification  []  See Discharge Summary         Progress towards goals / Updated goals:  Short Term Goals: To be accomplished in 1 weeks:  1.  Pt will be compliant with initial HEP in order to optimize therapy outcomes   Goal met. 8/12/20      Long Term Goals: To be accomplished in 4 weeks:  1.  Pt will improve FOTO by 23 points in order to demonstrate functional improvement. Progressing. Improved 11 points 9/1/20   2.  P will report 50% overall improvement in symptoms since start of care in order to improve QOL. Goal met.  Pt reports 90% improvement with therapy 8/26/20 No change 9/1/20    3.  Pt will improve right knee flexion AROM to 120* in order to improve ease of deep sitting and stairs. Not met.  109* (110* at eval) 9/1/20   4.  Pt will improve right knee strength to 4+/5 in order to improve ease of prolonged ambulation and stairs. Goal met. With extension, progressing with flexion 9/1/20   5.  Pt will improve B hip abduction/extension strength to 4/5 in order to improve kinetic chain alignment for increased standing/ambulatory tolerance. Not met.   See above 9/1/20   6.  Pt will improve standing tolerance to 10 minutes with minimal pain increase in order to improve ease of ADLs and household management.    Goal met.   Standing tolerance 15 minutes 9/1/20     PLAN  []  Upgrade activities as tolerated     [x]  Continue plan of care  []  Update interventions per flow sheet       []  Discharge due to:_  []  Other:_      Merari Osman, PT 9/1/2020  8:17 AM    Future Appointments   Date Time Provider [facility]   9/2/2020  8:30 AM Jerad Graf MD [place] [id number]

## 2020-09-02 ENCOUNTER — OFFICE VISIT (OUTPATIENT)
Dept: ORTHOPEDIC SURGERY | Facility: CLINIC | Age: 60
End: 2020-09-02

## 2020-09-02 VITALS
SYSTOLIC BLOOD PRESSURE: 146 MMHG | BODY MASS INDEX: 30.31 KG/M2 | TEMPERATURE: 97.2 F | HEIGHT: 66 IN | HEART RATE: 75 BPM | DIASTOLIC BLOOD PRESSURE: 81 MMHG | RESPIRATION RATE: 18 BRPM | OXYGEN SATURATION: 100 % | WEIGHT: 188.6 LBS

## 2020-09-02 DIAGNOSIS — G89.4 CHRONIC PAIN SYNDROME: ICD-10-CM

## 2020-09-02 DIAGNOSIS — R20.0 NUMBNESS AND TINGLING OF BOTH LOWER EXTREMITIES: ICD-10-CM

## 2020-09-02 DIAGNOSIS — M25.561 CHRONIC KNEE PAIN AFTER TOTAL REPLACEMENT OF RIGHT KNEE JOINT: Primary | ICD-10-CM

## 2020-09-02 DIAGNOSIS — G89.29 CHRONIC KNEE PAIN AFTER TOTAL REPLACEMENT OF RIGHT KNEE JOINT: Primary | ICD-10-CM

## 2020-09-02 DIAGNOSIS — R20.2 NUMBNESS AND TINGLING OF BOTH LOWER EXTREMITIES: ICD-10-CM

## 2020-09-02 DIAGNOSIS — Z96.651 CHRONIC KNEE PAIN AFTER TOTAL REPLACEMENT OF RIGHT KNEE JOINT: Primary | ICD-10-CM

## 2020-09-02 DIAGNOSIS — M54.50 LUMBAR PAIN: ICD-10-CM

## 2020-09-02 NOTE — PROGRESS NOTES
1. Have you been to the ER, urgent care clinic since your last visit? Hospitalized since your last visit? No    2. Have you seen or consulted any other health care providers outside of the [address] [place] [address] since your last visit? Include any pap smears or colon screening.  No

## 2020-09-02 NOTE — PROGRESS NOTES
Patient: Ludivina Suresh                MRN: [id number]       SSN: [id number]  YOB: 1960        AGE: 61 y.o. SEX: female  Body mass index is 30.44 kg/m². PCP: Jermaine Martinez MD  09/02/20    HISTORY:  She is disappointed with the result of the revision surgery. She has been well worked up for infection, loosening. She has had some bursitis. Overall, she functions will with it, but it does hurt her. Also, her back has been bothering her. Denies fevers or chills, any shortness of breath or chest pain. Denies much in the way of swelling or instability with regards to the knee. No start-up discomfort. Low back bothers her. She points to the L4-5, 5-1 level. PHYSICAL EXAMINATION:  The examination with a female assistant present. Hips rotate nicely. The low back is mildly tender. Neurologically grossly intact today. No foot drop. IT band and EHL 5/5. The knee replacement itself is very good motion, good stability. She actually walks quite normally with it. There is no antalgic gait. No start-up pain with it either. RADIOGRAPHS:   I did review her previous x-rays confirming the knee replacement is well aligned and well fixed. No loosening at this point. PLAN:  We will see her back in 6 months. I will get x-rays of the knee at that point in time and referral to pain management also for [facility]. I think her back can be managed non-operatively. MRI review reveals some degenerative changes. No high-grade stenosis however.          REVIEW OF SYSTEMS:      CON: negative  EYE: negative   ENT: negative  RESP: negative  GI:    negative   :  negative  MSK: Positive  A twelve point review of systems was completed, positives noted and all other systems were reviewed and are negative          Past Medical History:   Diagnosis Date    Arthritis     Elevated cholesterol     no meds     GERD (gastroesophageal reflux disease)     Hypertension History reviewed. No pertinent family history. Current Outpatient Medications   Medication Sig Dispense Refill    meloxicam (MOBIC) 15 mg tablet Take 1 Tab by mouth daily. 30 Tab 2    metFORMIN (GLUCOPHAGE) 500 mg tablet       aspirin delayed-release 325 mg tablet Take 1 Tab by mouth two (2) times a day. 60 Tab 1    ferrous sulfate 325 mg (65 mg iron) tablet Take 1 Tab by mouth two (2) times daily (with meals).  60 Tab 1    VITAMIN D2 50,000 unit capsule take 1 capsule by mouth every week  0    oxyCODONE-acetaminophen (PERCOCET 7.5) 7.5-325 mg per tablet Take 1 Tab by mouth every six (6) hours as needed for Pain. take 1-2 tabs wvry 6 hours as needed      hydroCHLOROthiazide (HYDRODIURIL) 25 mg tablet take 1 tablet by mouth once daily  0    Omeprazole delayed release (PRILOSEC D/R) 20 mg tablet take 1 tablet by mouth once daily  0       No Known Allergies    Past Surgical History:   Procedure Laterality Date    HX  SECTION      x 4    HX KNEE ARTHROSCOPY Right     HX KNEE REPLACEMENT Right 2018    HX TUBAL LIGATION         Social History     Socioeconomic History    Marital status: LEGALLY      Spouse name: Not on file    Number of children: Not on file    Years of education: Not on file    Highest education level: Not on file   Occupational History    Not on file   Social Needs    Financial resource strain: Not on file    Food insecurity     Worry: Not on file     Inability: Not on file    Transportation needs     Medical: Not on file     Non-medical: Not on file   Tobacco Use    Smoking status: Never Smoker    Smokeless tobacco: Never Used   Substance and Sexual Activity    Alcohol use: No    Drug use: No    Sexual activity: Not on file   Lifestyle    Physical activity     Days per week: Not on file     Minutes per session: Not on file    Stress: Not on file   Relationships    Social connections     Talks on phone: Not on file     Gets together: Not on file Attends Congregational service: Not on file     Active member of club or organization: Not on file     Attends meetings of clubs or organizations: Not on file     Relationship status: Not on file    Intimate partner violence     Fear of current or ex partner: Not on file     Emotionally abused: Not on file     Physically abused: Not on file     Forced sexual activity: Not on file   Other Topics Concern    Not on file   Social History Narrative    Not on file       Visit Vitals  /81 (BP 1 Location: Left arm, BP Patient Position: Sitting)   Pulse 75   Temp 97.2 °F (36.2 °C) (Oral)   Resp 18   Ht 5' 6\" (1.676 m)   Wt 188 lb 9.6 oz (85.5 kg)   SpO2 100%   BMI 30.44 kg/m²         PHYSICAL EXAMINATION:  GENERAL: Alert and oriented x3, in no acute distress, well-developed, well-nourished, afebrile. HEART: No JVD. EYES: No scleral icterus   NECK: No significant lymphadenopathy   LUNGS: No respiratory compromise or indrawing  ABDOMEN: Soft, non-tender, non-distended. Electronically signed by:  Bernardo Amin MD

## 2020-10-23 ENCOUNTER — TELEPHONE (OUTPATIENT)
Dept: PHYSICAL THERAPY | Age: 60
End: 2020-10-23

## 2020-10-24 NOTE — PROGRESS NOTES
[facility] [address]  [address] [facility]  [phone] fax    Physical Therapy Discharge Summary    Patient name: Michael Miller Start of Care: 20   Referral source: Stanley Llanos MD  : 1960   Medical/Treatment Diagnosis: Right knee pain [M25.561]  Payor: BLUE CROSS / Plan: [address] [place] [address] / Product Type: PPO /  Onset Date:2019, exacerbation within the past 6 months      Prior Hospitalization: see medical history Provider#: [id number]   Medications: Verified on Patient Summary List    Comorbidities: diabetes, HTN, arthritis    Prior Level of Function: walks 20-25 minutes daily, 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, [facility]    Visits from [facility] of Care: 8    Missed Visits: 1    Reporting Period : 20 to 20    Summary of Care:  1.  Pt will improve FOTO by 23 points in order to demonstrate functional improvement. 2.  Pt will improve right knee flexion AROM to 120* in order to improve ease of deep sitting and stairs. 3.  Pt will improve right knee flexion strength to 4+/5 in order to improve ease of prolonged ambulation and stairs. 4.  Pt will improve B hip abduction/extension strength to 4/5 in order to improve kinetic chain alignment for increased standing/ambulatory tolerance. 5.  Pt will report understanding/compliance with final HEP in order to allow for continued progression independently following DC.       Updated goals not assessed as pt did not return to therapy following last progress note    ASSESSMENT/RECOMMENDATIONS:  Pt was placed on hold and instructed to follow up with MD d/t palpable masses in thigh. She did not return to therapy following hold and is DC at this time. Called pt to follow up with her, and she reports she is still undergoing testing for masses, and she has been referred to pain management.   Please refer to last progress note for last formal assessment of pt's progress.       [x]Discontinue therapy: []Patient has reached or is progressing toward set goals      [x]Patient is non-compliant or has abdicated      []Due to lack of appreciable progress towards set goals    Casey Izquierdo, PT 10/24/2020 6:31 PM

## 2021-02-22 ENCOUNTER — TRANSCRIBE ORDER (OUTPATIENT)
Dept: SCHEDULING | Age: 61
End: 2021-02-22

## 2021-02-22 DIAGNOSIS — Z12.31 ENCOUNTER FOR SCREENING MAMMOGRAM FOR MALIGNANT NEOPLASM OF BREAST: Primary | ICD-10-CM

## 2021-02-23 ENCOUNTER — HOSPITAL ENCOUNTER (OUTPATIENT)
Dept: MAMMOGRAPHY | Age: 61
Discharge: HOME | End: 2021-02-23
Attending: INTERNAL MEDICINE
Payer: MEDICAID

## 2021-02-23 ENCOUNTER — HOSPITAL ENCOUNTER (OUTPATIENT)
Dept: LAB | Age: 61
Discharge: HOME | End: 2021-02-23

## 2021-02-23 DIAGNOSIS — Z12.31 ENCOUNTER FOR SCREENING MAMMOGRAM FOR MALIGNANT NEOPLASM OF BREAST: ICD-10-CM

## 2021-02-23 LAB — XX-LABCORP SPECIMEN COL,LCBCF: NORMAL

## 2021-02-23 PROCEDURE — 99001 SPECIMEN HANDLING PT-LAB: CPT

## 2021-02-23 PROCEDURE — 77063 BREAST TOMOSYNTHESIS BI: CPT

## 2021-03-12 ENCOUNTER — OFFICE VISIT (OUTPATIENT)
Dept: ORTHOPEDIC SURGERY | Age: 61
End: 2021-03-12

## 2021-03-12 ENCOUNTER — HOSPITAL ENCOUNTER (INPATIENT)
Age: 61
LOS: 5 days | Discharge: HOME | DRG: 751 | End: 2021-03-17
Attending: PSYCHIATRY & NEUROLOGY | Admitting: PSYCHIATRY & NEUROLOGY
Payer: MEDICAID

## 2021-03-12 ENCOUNTER — HOSPITAL ENCOUNTER (EMERGENCY)
Age: 61
Discharge: PSYCHIATRIC OR SUBSTANCE USE FACILITY | DRG: 751 | End: 2021-03-12
Attending: EMERGENCY MEDICINE | Admitting: PSYCHIATRY & NEUROLOGY
Payer: MEDICAID

## 2021-03-12 VITALS
TEMPERATURE: 98.8 F | OXYGEN SATURATION: 99 % | SYSTOLIC BLOOD PRESSURE: 147 MMHG | DIASTOLIC BLOOD PRESSURE: 87 MMHG | HEART RATE: 74 BPM | BODY MASS INDEX: 30.83 KG/M2 | WEIGHT: 191 LBS | RESPIRATION RATE: 17 BRPM

## 2021-03-12 VITALS
HEIGHT: 66 IN | WEIGHT: 191 LBS | SYSTOLIC BLOOD PRESSURE: 154 MMHG | HEART RATE: 39 BPM | RESPIRATION RATE: 18 BRPM | DIASTOLIC BLOOD PRESSURE: 103 MMHG | OXYGEN SATURATION: 95 % | BODY MASS INDEX: 30.7 KG/M2 | TEMPERATURE: 97.3 F

## 2021-03-12 DIAGNOSIS — F33.2 MAJOR DEPRESSIVE DISORDER, RECURRENT EPISODE, SEVERE WITH ANXIOUS DISTRESS (HCC): ICD-10-CM

## 2021-03-12 DIAGNOSIS — R45.851 DEPRESSION WITH SUICIDAL IDEATION: Primary | ICD-10-CM

## 2021-03-12 DIAGNOSIS — F32.A DEPRESSION WITH SUICIDAL IDEATION: Primary | ICD-10-CM

## 2021-03-12 LAB
AMPHET UR QL SCN: NEGATIVE
ANION GAP SERPL CALC-SCNC: 7 MMOL/L (ref 3–18)
BARBITURATES UR QL SCN: NEGATIVE
BASOPHILS # BLD: 0.1 K/UL (ref 0–0.1)
BASOPHILS NFR BLD: 1 % (ref 0–2)
BENZODIAZ UR QL: NEGATIVE
BUN SERPL-MCNC: 9 MG/DL (ref 7–18)
BUN/CREAT SERPL: 11 (ref 12–20)
CALCIUM SERPL-MCNC: 9.1 MG/DL (ref 8.5–10.1)
CANNABINOIDS UR QL SCN: NEGATIVE
CHLORIDE SERPL-SCNC: 107 MMOL/L (ref 100–111)
CO2 SERPL-SCNC: 26 MMOL/L (ref 21–32)
COCAINE UR QL SCN: NEGATIVE
COVID-19 RAPID TEST, COVR: NOT DETECTED
CREAT SERPL-MCNC: 0.8 MG/DL (ref 0.6–1.3)
DIFFERENTIAL METHOD BLD: NORMAL
EOSINOPHIL # BLD: 0.1 K/UL (ref 0–0.4)
EOSINOPHIL NFR BLD: 1 % (ref 0–5)
ERYTHROCYTE [DISTWIDTH] IN BLOOD BY AUTOMATED COUNT: 13.6 % (ref 11.6–14.5)
ETHANOL SERPL-MCNC: 8 MG/DL (ref 0–3)
GLUCOSE BLD STRIP.AUTO-MCNC: 72 MG/DL (ref 70–110)
GLUCOSE BLD STRIP.AUTO-MCNC: 76 MG/DL (ref 70–110)
GLUCOSE SERPL-MCNC: 121 MG/DL (ref 74–99)
HCT VFR BLD AUTO: 43.1 % (ref 35–45)
HDSCOM,HDSCOM: NORMAL
HGB BLD-MCNC: 13.9 G/DL (ref 12–16)
LYMPHOCYTES # BLD: 2.3 K/UL (ref 0.9–3.6)
LYMPHOCYTES NFR BLD: 30 % (ref 21–52)
MCH RBC QN AUTO: 28 PG (ref 24–34)
MCHC RBC AUTO-ENTMCNC: 32.3 G/DL (ref 31–37)
MCV RBC AUTO: 86.7 FL (ref 74–97)
METHADONE UR QL: NEGATIVE
MONOCYTES # BLD: 0.7 K/UL (ref 0.05–1.2)
MONOCYTES NFR BLD: 9 % (ref 3–10)
NEUTS SEG # BLD: 4.7 K/UL (ref 1.8–8)
NEUTS SEG NFR BLD: 59 % (ref 40–73)
OPIATES UR QL: NEGATIVE
PCP UR QL: NEGATIVE
PLATELET # BLD AUTO: 383 K/UL (ref 135–420)
PMV BLD AUTO: 9.6 FL (ref 9.2–11.8)
POTASSIUM SERPL-SCNC: 3.4 MMOL/L (ref 3.5–5.5)
RBC # BLD AUTO: 4.97 M/UL (ref 4.2–5.3)
SARS-COV-2, COV2: NORMAL
SODIUM SERPL-SCNC: 140 MMOL/L (ref 136–145)
SOURCE, COVRS: NORMAL
WBC # BLD AUTO: 7.8 K/UL (ref 4.6–13.2)

## 2021-03-12 PROCEDURE — 87635 SARS-COV-2 COVID-19 AMP PRB: CPT

## 2021-03-12 PROCEDURE — 65220000003 HC RM SEMIPRIVATE PSYCH

## 2021-03-12 PROCEDURE — 99285 EMERGENCY DEPT VISIT HI MDM: CPT

## 2021-03-12 PROCEDURE — 85025 COMPLETE CBC W/AUTO DIFF WBC: CPT

## 2021-03-12 PROCEDURE — 82962 GLUCOSE BLOOD TEST: CPT

## 2021-03-12 PROCEDURE — 80307 DRUG TEST PRSMV CHEM ANLYZR: CPT

## 2021-03-12 PROCEDURE — 74011250637 HC RX REV CODE- 250/637: Performed by: PSYCHIATRY & NEUROLOGY

## 2021-03-12 PROCEDURE — 82077 ASSAY SPEC XCP UR&BREATH IA: CPT

## 2021-03-12 PROCEDURE — 80048 BASIC METABOLIC PNL TOTAL CA: CPT

## 2021-03-12 RX ORDER — TRAZODONE HYDROCHLORIDE 50 MG/1
50 TABLET ORAL
COMMUNITY
End: 2021-03-17

## 2021-03-12 RX ORDER — TRAZODONE HYDROCHLORIDE 50 MG/1
50 TABLET ORAL
Status: DISCONTINUED | OUTPATIENT
Start: 2021-03-12 | End: 2021-03-17 | Stop reason: HOSPADM

## 2021-03-12 RX ORDER — MAGNESIUM SULFATE 100 %
16 CRYSTALS MISCELLANEOUS AS NEEDED
Status: DISCONTINUED | OUTPATIENT
Start: 2021-03-12 | End: 2021-03-17 | Stop reason: HOSPADM

## 2021-03-12 RX ORDER — METFORMIN HYDROCHLORIDE 500 MG/1
1000 TABLET ORAL 2 TIMES DAILY WITH MEALS
Status: DISCONTINUED | OUTPATIENT
Start: 2021-03-12 | End: 2021-03-17 | Stop reason: HOSPADM

## 2021-03-12 RX ORDER — HYDROXYZINE PAMOATE 50 MG/1
50 CAPSULE ORAL
Status: DISCONTINUED | OUTPATIENT
Start: 2021-03-12 | End: 2021-03-17 | Stop reason: HOSPADM

## 2021-03-12 RX ORDER — CITALOPRAM 10 MG/1
10 TABLET ORAL DAILY
Status: DISCONTINUED | OUTPATIENT
Start: 2021-03-13 | End: 2021-03-13

## 2021-03-12 RX ORDER — MELOXICAM 7.5 MG/1
7.5 TABLET ORAL DAILY
Status: DISCONTINUED | OUTPATIENT
Start: 2021-03-13 | End: 2021-03-17 | Stop reason: HOSPADM

## 2021-03-12 RX ORDER — PANTOPRAZOLE SODIUM 40 MG/1
40 TABLET, DELAYED RELEASE ORAL
Status: DISCONTINUED | OUTPATIENT
Start: 2021-03-13 | End: 2021-03-17 | Stop reason: HOSPADM

## 2021-03-12 RX ORDER — DEXTROSE 50 % IN WATER (D50W) INTRAVENOUS SYRINGE
25-50 AS NEEDED
Status: DISCONTINUED | OUTPATIENT
Start: 2021-03-12 | End: 2021-03-17 | Stop reason: HOSPADM

## 2021-03-12 RX ORDER — HYDROCHLOROTHIAZIDE 25 MG/1
25 TABLET ORAL DAILY
Status: DISCONTINUED | OUTPATIENT
Start: 2021-03-13 | End: 2021-03-17 | Stop reason: HOSPADM

## 2021-03-12 RX ORDER — INSULIN LISPRO 100 [IU]/ML
INJECTION, SOLUTION INTRAVENOUS; SUBCUTANEOUS
Status: DISCONTINUED | OUTPATIENT
Start: 2021-03-12 | End: 2021-03-17 | Stop reason: HOSPADM

## 2021-03-12 RX ORDER — ASPIRIN 325 MG
325 TABLET ORAL 2 TIMES DAILY
Status: DISCONTINUED | OUTPATIENT
Start: 2021-03-12 | End: 2021-03-17 | Stop reason: HOSPADM

## 2021-03-12 RX ADMIN — METFORMIN HYDROCHLORIDE 1000 MG: 500 TABLET ORAL at 16:32

## 2021-03-12 RX ADMIN — HYDROXYZINE PAMOATE 50 MG: 50 CAPSULE ORAL at 16:32

## 2021-03-12 RX ADMIN — ASPIRIN 325 MG ORAL TABLET 325 MG: 325 PILL ORAL at 21:30

## 2021-03-12 NOTE — PROGRESS NOTES
Problem: Suicide  Goal: *STG: Remains safe in hospital  Outcome: Progressing Towards Goal  Goal: *STG: Seeks staff when feelings of self harm or harm towards others arise  Outcome: Progressing Towards Goal  Goal: *STG: Attends activities and groups  Outcome: Progressing Towards Goal  Goal: *STG/LTG: Complies with medication therapy  Outcome: Progressing Towards Goal     Ange Cheney is a 61 y.o. Female that presented today as depressed, anxious, flat, but cooperative. Ange Cheney has been compliant with medications, has eaten the dinner offered, and was cooperative with the admissions process. RN's will initiate, develop, implement, review, and revise treatment plans as necessary. Will continue to provide education, redirection, and support as needed or verbalized by patient.    Signed By: Kayleigh Schneider RN     March 12, 2021

## 2021-03-12 NOTE — BH NOTES
Nursing Admission Note    Patient arrived onto unit via wheelchair dressed in own clothing and accompanied by security staff. Patient was cooperative with admissions process, did fill out paperwork, and did allow writer to orient to the unit. Patient currently on safety/suicide/falls precautions, and general observation rounding. Patient was Interacted appropriately and presents as Depressed and Anxious, with Friendly and Cooperative disposition and Goal Directed thought content. Patient reports having had increased depression over the past few weeks that came to a head where she was in her kitchen with a knife almost ready to hurt herself. She self-aborted the attempt and agreed to come in to seek treatment. Patient also stated that this all mainly began with the loss of her son 3 years ago to DM. Patient is voluntarily for treatment at this time. Patient given hygiene bag and escorted to room. Will continue to monitor and provide support as needed.

## 2021-03-12 NOTE — ROUTINE PROCESS
TRANSFER - OUT REPORT: 
 
Verbal report given to Cherise Kennedy RN (name) on Stacey Robbins  being transferred to [address] [place] [address] room 124 #2 (unit) for routine progression of care Report consisted of patients Situation, Background, Assessment and  
Recommendations(SBAR). Information from the following report(s) SBAR was reviewed with the receiving nurse. Lines:    
 
Opportunity for questions and clarification was provided. Patient transported with:  ( L

## 2021-03-12 NOTE — BSMART NOTE
62 yo F at [facility] referred by Carlito Hassan & identified by [facility] staff and self identified with stating name &  over phone and consented to speak to this writer. CC: increased depression & suicidal ideation States she has a lot of trouble sleeping, recently prescribed Trazodone that only helps for 1-2 hours & then she is awake again. Stressors include grief over the sudden death of her 29 yo son 3 years ago. She came home and found him  from diabetic complications. Now lives alone, daughter has been staying with her recently due to safety concerns. 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 Feels hopeless & helpless and like giving up. Last week picked up a knife with intention of killing herself, did not follow through. Feeling more suicidal again and is seeking help voluntarily. Denies homicidal ideation. Denies A / V hallucinations. Denies alcohol or drug use. DISPOSITION: Discussed with . Client is willing for voluntary psychiatric admission for safety from self-harm.  contacted and admission orders received. Report [address] [facility] [address]

## 2021-03-12 NOTE — PROGRESS NOTES
Ms. Shannon Khanna arrived for an appointment today with Dr. Luís Martinez. While in our triage area, Ms. Shannon Khanna stated that she has had some suicidal thoughts. Ms. Shannon Khanna stated that with the pain in her leg, she is unable to do the things in life that she wants. After discussing with with Dr. Luís Martinez, it is suggested that Ms. Shannon Khanna go to the ED. I spoke with Ms. Shannon Khanna about this and she asked me if I would walk with her across the street to [facility]. I walked Ms. Shannon Khanna to the ED, where she was checked in.

## 2021-03-12 NOTE — ED TRIAGE NOTES
Patient has been dealing with depression since son  3 years ago. Has been having knee pain for 2 years that has kept her from working. States had one episode last week where she told herself she \"should grab a knife and kill myself\". Denies active SI or HI at this time.

## 2021-03-12 NOTE — ED PROVIDER NOTES
EMERGENCY DEPARTMENT HISTORY AND PHYSICAL EXAM    8:24 AM      Date: 3/12/2021  Patient Name: An Aj    History of Presenting Illness     Chief Complaint   Patient presents with    Depression         History Provided By: Patient    Additional History (Context): An Aj is a 61 y.o. female with diabetes, hypertension and hyperlipidemia who presents with suicidal ideation. She thinks she went to get. She dealing with her chronic knee pain, the death of her son 3 years ago and overall depression. Patient states that 2 weeks ago she grabbed a knife in the kitchen to kill herself. Patient denies chest pain, nausea, vomiting or diarrhea. Patient denies smoking, alcohol or recreational drug use. Stas Horn PCP: Judith Chavez MD      Current Outpatient Medications   Medication Sig Dispense Refill    traZODone (DESYREL) 50 mg tablet Take 50 mg by mouth nightly.  meloxicam (MOBIC) 15 mg tablet Take 1 Tab by mouth daily. 30 Tab 2    metFORMIN (GLUCOPHAGE) 500 mg tablet       oxyCODONE-acetaminophen (PERCOCET 7.5) 7.5-325 mg per tablet Take 1 Tab by mouth every six (6) hours as needed for Pain. take 1-2 tabs wvry 6 hours as needed      aspirin delayed-release 325 mg tablet Take 1 Tab by mouth two (2) times a day. 60 Tab 1    ferrous sulfate 325 mg (65 mg iron) tablet Take 1 Tab by mouth two (2) times daily (with meals).  60 Tab 1    VITAMIN D2 50,000 unit capsule take 1 capsule by mouth every week  0    hydroCHLOROthiazide (HYDRODIURIL) 25 mg tablet take 1 tablet by mouth once daily  0    Omeprazole delayed release (PRILOSEC D/R) 20 mg tablet take 1 tablet by mouth once daily  0       Past History     Past Medical History:  Past Medical History:   Diagnosis Date    Anxiety     Arthritis     Depression     Elevated cholesterol     no meds     GERD (gastroesophageal reflux disease)     Hypertension        Past Surgical History:  Past Surgical History:   Procedure Laterality Date    HX  SECTION      x 4    HX KNEE ARTHROSCOPY Right     HX KNEE REPLACEMENT Right 2018    HX TUBAL LIGATION         Family History:  History reviewed. No pertinent family history. Social History:  Social History     Tobacco Use    Smoking status: Never Smoker    Smokeless tobacco: Never Used   Substance Use Topics    Alcohol use: No    Drug use: No       Allergies:  No Known Allergies      Review of Systems       Review of Systems   Constitutional: Negative. Negative for chills, diaphoresis and fever. HENT: Negative. Negative for congestion, rhinorrhea and sore throat. Eyes: Negative. Negative for pain, discharge and redness. Respiratory: Negative. Negative for cough, chest tightness, shortness of breath and wheezing. Cardiovascular: Negative. Negative for chest pain. Gastrointestinal: Negative. Negative for abdominal pain, constipation, diarrhea, nausea and vomiting. Genitourinary: Negative. Negative for dysuria, flank pain, frequency, hematuria and urgency. Musculoskeletal: Negative. Negative for back pain and neck pain. Skin: Negative. Negative for rash. Neurological: Negative. Negative for syncope, weakness, numbness and headaches. Psychiatric/Behavioral: Positive for suicidal ideas. All other systems reviewed and are negative. Physical Exam     Visit Vitals  BP (!) 170/105 (BP 1 Location: Left upper arm, BP Patient Position: At rest)   Pulse 88   Temp 98.8 °F (37.1 °C)   Resp 18   Wt 86.6 kg (191 lb)   SpO2 96%   BMI 30.83 kg/m²         Physical Exam  Vitals signs and nursing note reviewed. Constitutional:       General: She is not in acute distress. Appearance: Normal appearance. She is well-developed. She is not ill-appearing, toxic-appearing or diaphoretic. HENT:      Head: Normocephalic and atraumatic. Mouth/Throat:      Pharynx: No oropharyngeal exudate. Eyes:      General: No scleral icterus.      Conjunctiva/sclera: Conjunctivae normal.      Pupils: Pupils are equal, round, and reactive to light. Neck:      Musculoskeletal: Normal range of motion and neck supple. Thyroid: No thyromegaly. Vascular: No hepatojugular reflux or JVD. Trachea: No tracheal deviation. Cardiovascular:      Rate and Rhythm: Normal rate and regular rhythm. Pulses: Normal pulses. Radial pulses are 2+ on the right side and 2+ on the left side. Dorsalis pedis pulses are 2+ on the right side and 2+ on the left side. Heart sounds: Normal heart sounds, S1 normal and S2 normal. No murmur. No gallop. No S3 or S4 sounds. Pulmonary:      Effort: Pulmonary effort is normal. No respiratory distress. Breath sounds: Normal breath sounds. No decreased breath sounds, wheezing, rhonchi or rales. Abdominal:      General: Bowel sounds are normal. There is no distension. Palpations: Abdomen is soft. Abdomen is not rigid. There is no mass. Tenderness: There is no abdominal tenderness. There is no guarding or rebound. Negative signs include Ferrara's sign and McBurney's sign. Musculoskeletal: Normal range of motion. Lymphadenopathy:      Head:      Right side of head: No submental, submandibular, preauricular or occipital adenopathy. Left side of head: No submental, submandibular, preauricular or occipital adenopathy. Cervical: No cervical adenopathy. Upper Body:      Right upper body: No supraclavicular adenopathy. Left upper body: No supraclavicular adenopathy. Skin:     General: Skin is warm and dry. Findings: No rash. Neurological:      Mental Status: She is alert. She is not disoriented. GCS: GCS eye subscore is 4. GCS verbal subscore is 5. GCS motor subscore is 6. Cranial Nerves: No cranial nerve deficit. Sensory: No sensory deficit. Coordination: Coordination normal.      Gait: Gait normal.      Deep Tendon Reflexes: Reflexes are normal and symmetric.    Psychiatric: Attention and Perception: Attention normal.         Mood and Affect: Mood is depressed. Affect is tearful. Speech: Speech normal.         Behavior: Behavior normal.         Cognition and Memory: Cognition normal.         Judgment: Judgment normal.           Diagnostic Study Results     Labs -  Recent Results (from the past 12 hour(s))   CBC WITH AUTOMATED DIFF    Collection Time: 03/12/21  8:34 AM   Result Value Ref Range    WBC 7.8 4.6 - 13.2 K/uL    RBC 4.97 4.20 - 5.30 M/uL    HGB 13.9 12.0 - 16.0 g/dL    HCT 43.1 35.0 - 45.0 %    MCV 86.7 74.0 - 97.0 FL    MCH 28.0 24.0 - 34.0 PG    MCHC 32.3 31.0 - 37.0 g/dL    RDW 13.6 11.6 - 14.5 %    PLATELET 341 597 - 064 K/uL    MPV 9.6 9.2 - 11.8 FL    NEUTROPHILS 59 40 - 73 %    LYMPHOCYTES 30 21 - 52 %    MONOCYTES 9 3 - 10 %    EOSINOPHILS 1 0 - 5 %    BASOPHILS 1 0 - 2 %    ABS. NEUTROPHILS 4.7 1.8 - 8.0 K/UL    ABS. LYMPHOCYTES 2.3 0.9 - 3.6 K/UL    ABS. MONOCYTES 0.7 0.05 - 1.2 K/UL    ABS. EOSINOPHILS 0.1 0.0 - 0.4 K/UL    ABS.  BASOPHILS 0.1 0.0 - 0.1 K/UL    DF AUTOMATED     METABOLIC PANEL, BASIC    Collection Time: 03/12/21  8:34 AM   Result Value Ref Range    Sodium 140 136 - 145 mmol/L    Potassium 3.4 (L) 3.5 - 5.5 mmol/L    Chloride 107 100 - 111 mmol/L    CO2 26 21 - 32 mmol/L    Anion gap 7 3.0 - 18 mmol/L    Glucose 121 (H) 74 - 99 mg/dL    BUN 9 7.0 - 18 MG/DL    Creatinine 0.80 0.6 - 1.3 MG/DL    BUN/Creatinine ratio 11 (L) 12 - 20      GFR est AA >60 >60 ml/min/1.73m2    GFR est non-AA >60 >60 ml/min/1.73m2    Calcium 9.1 8.5 - 10.1 MG/DL   ETHYL ALCOHOL    Collection Time: 03/12/21  8:34 AM   Result Value Ref Range    ALCOHOL(ETHYL),SERUM 8 (H) 0 - 3 MG/DL   DRUG SCREEN, URINE    Collection Time: 03/12/21  9:07 AM   Result Value Ref Range    BENZODIAZEPINES Negative NEG      BARBITURATES Negative NEG      THC (TH-CANNABINOL) Negative NEG      OPIATES Negative NEG      PCP(PHENCYCLIDINE) Negative NEG      COCAINE Negative NEG AMPHETAMINES Negative NEG      METHADONE Negative NEG      HDSCOM (NOTE)    SARS-COV-2    Collection Time: 03/12/21  9:54 AM   Result Value Ref Range    SARS-CoV-2 Please find results under separate order     COVID-19 RAPID TEST    Collection Time: 03/12/21  9:54 AM   Result Value Ref Range    Specimen source Nasopharyngeal      COVID-19 rapid test Not detected NOTD         Radiologic Studies -   No orders to display         Medical Decision Making   Provider Notes (Medical Decision Making):  MDM  Number of Diagnoses or Management Options  Diagnosis management comments: Suicidal ideation   Depression       I am the first provider for this patient. I reviewed the vital signs, available nursing notes, past medical history, past surgical history, family history and social history. Vital Signs-Reviewed the patient's vital signs. Records Reviewed: Nursing Notes (Time of Review: 8:24 AM)    ED Course: Progress Notes, Reevaluation, and Consults:    Labs essentially normal.  UDS negative. Etoh 8. Chest X-Ray showed No acute process. 9:24 AM 3/12/2021    Consult:  Discussed care with Brianna Gilmore. Crisis Services. Standard discussion; including history of patients chief complaint, available diagnostic results, and treatment course. Agrees to see patient. 9:34 AM    Consult:  Discussed care with Brianna Gilmore. Standard discussion; including history of patients chief complaint, available diagnostic results, and treatment course. Agrees with admission. 12:51 PM        Diagnosis             Clinical Impression:   1. Depression with suicidal ideation        Disposition: Admitted       Attestation        Provider Attestation:     I personally performed the services described in the documentation, reviewed the documentation and it accurately and completely records my words and actions utilizing the [address] [place] [address] March 12, 2021 at 12:51 PM - Allie Cornejo DO    Disclaimer.   It is dictated using utilizing voice recognition software. Unfortunately this leads to occasional typographical errors. I apologize in advance if the situation occurs. If questions arise please do not hesitate to contact me or call our department.

## 2021-03-13 PROBLEM — F33.2 MAJOR DEPRESSIVE DISORDER, RECURRENT EPISODE, SEVERE WITH ANXIOUS DISTRESS (HCC): Status: ACTIVE | Noted: 2021-03-12

## 2021-03-13 LAB
GLUCOSE BLD STRIP.AUTO-MCNC: 103 MG/DL (ref 70–110)
GLUCOSE BLD STRIP.AUTO-MCNC: 103 MG/DL (ref 70–110)
GLUCOSE BLD STRIP.AUTO-MCNC: 84 MG/DL (ref 70–110)
GLUCOSE BLD STRIP.AUTO-MCNC: 99 MG/DL (ref 70–110)

## 2021-03-13 PROCEDURE — 82962 GLUCOSE BLOOD TEST: CPT

## 2021-03-13 PROCEDURE — 65220000003 HC RM SEMIPRIVATE PSYCH

## 2021-03-13 PROCEDURE — 99222 1ST HOSP IP/OBS MODERATE 55: CPT | Performed by: PSYCHIATRY & NEUROLOGY

## 2021-03-13 PROCEDURE — 93005 ELECTROCARDIOGRAM TRACING: CPT

## 2021-03-13 PROCEDURE — 74011250637 HC RX REV CODE- 250/637: Performed by: PSYCHIATRY & NEUROLOGY

## 2021-03-13 RX ORDER — DULOXETIN HYDROCHLORIDE 30 MG/1
30 CAPSULE, DELAYED RELEASE ORAL DAILY
Status: DISCONTINUED | OUTPATIENT
Start: 2021-03-14 | End: 2021-03-15

## 2021-03-13 RX ADMIN — HYDROCHLOROTHIAZIDE 25 MG: 25 TABLET ORAL at 08:30

## 2021-03-13 RX ADMIN — PANTOPRAZOLE SODIUM 40 MG: 40 TABLET, DELAYED RELEASE ORAL at 08:30

## 2021-03-13 RX ADMIN — CITALOPRAM HYDROBROMIDE 10 MG: 10 TABLET ORAL at 08:30

## 2021-03-13 RX ADMIN — METFORMIN HYDROCHLORIDE 1000 MG: 500 TABLET ORAL at 08:30

## 2021-03-13 RX ADMIN — ASPIRIN 325 MG ORAL TABLET 325 MG: 325 PILL ORAL at 08:30

## 2021-03-13 RX ADMIN — ASPIRIN 325 MG ORAL TABLET 325 MG: 325 PILL ORAL at 20:14

## 2021-03-13 RX ADMIN — METFORMIN HYDROCHLORIDE 1000 MG: 500 TABLET ORAL at 17:24

## 2021-03-13 RX ADMIN — MELOXICAM 7.5 MG: 7.5 TABLET ORAL at 08:30

## 2021-03-13 NOTE — GROUP NOTE
BRET  GROUP DOCUMENTATION INDIVIDUAL Group Therapy Note Date: 3/12/2021 Group Start Time: 2030 Group End Time: 2045 Group Topic: Education Group - Inpatient [facility] 1 ADULT CHEM DEP Niecy Ríos., RN 
 
BRET  GROUP DOCUMENTATION GROUP Group Therapy Note Attendees: 5 Attendance: Did not attend group. Torey Penn

## 2021-03-13 NOTE — H&P
Psychiatry History and Physical    Subjective:     Date of Evaluation:  3/13/2021    Reason for Referral:  Sabine Kuo was referred to the examiners from ED for SI. History of Presenting Problem: 62 yo AA female in NAD, well developed and nourished, with hx of MDD, Diabetes, HTN, and HLD. Presented to the ED with SI, no plan at present. She did try to cut her wrists approx. 1 month ago but was not able to complete it. No H/I. She does state that she will occasionally have visions of things, she thought she saw her mom at one point. She also endorses recently developing AH at times but can't understand what they are saying. She continues to have depression over son passing away 3 yrs ago from a diabetic coma. She also stopped working in 2020 which has increased her depression. Patient Active Problem List    Diagnosis Date Noted    Suicidal ideation 2021    Major depressive disorder, recurrent episode, severe with anxious distress ([facility] [address] 2021    Failed total knee replacement ([facility] [address] 10/23/2019    Arthritis of knee, right 2018     Past Medical History:   Diagnosis Date    Anxiety     Arthritis     Depression     Elevated cholesterol     no meds     GERD (gastroesophageal reflux disease)     Hypertension      Past Surgical History:   Procedure Laterality Date    HX  SECTION      x 4    HX KNEE ARTHROSCOPY Right     HX KNEE REPLACEMENT Right 2018    HX TUBAL LIGATION         No family history on file. Social History     Tobacco Use    Smoking status: Never Smoker    Smokeless tobacco: Never Used   Substance Use Topics    Alcohol use: No     Prior to Admission medications    Medication Sig Start Date End Date Taking? Authorizing Provider   traZODone (DESYREL) 50 mg tablet Take 50 mg by mouth nightly. Yes Other, MD Estrada   meloxicam (MOBIC) 15 mg tablet Take 1 Tab by mouth daily.  20  Yes Annika Alejandro PA-C   metFORMIN (GLUCOPHAGE) 500 mg tablet  19 Yes Provider, Historical   oxyCODONE-acetaminophen (PERCOCET 7.5) 7.5-325 mg per tablet Take 1 Tab by mouth every six (6) hours as needed for Pain. take 1-2 tabs wvry 6 hours as needed   Yes Provider, Historical   aspirin delayed-release 325 mg tablet Take 1 Tab by mouth two (2) times a day. 10/24/19  Yes Shannon HIGGINBOTHAM PA-C   ferrous sulfate 325 mg (65 mg iron) tablet Take 1 Tab by mouth two (2) times daily (with meals).  10/24/19  Yes Katelynn Magana PA-C   VITAMIN D2 50,000 unit capsule take 1 capsule by mouth every week 3/5/18  Yes Provider, Historical   hydroCHLOROthiazide (HYDRODIURIL) 25 mg tablet take 1 tablet by mouth once daily 3/5/18  Yes Provider, Historical   Omeprazole delayed release (PRILOSEC D/R) 20 mg tablet take 1 tablet by mouth once daily 3/5/18  Yes Provider, Historical     No Known Allergies     Review of Systems - History obtained from chart review and the patient  General ROS: negative  Psychological ROS: positive for - anxiety, depression and suicidal ideation  Ophthalmic ROS: negative  ENT ROS: negative  Allergy and Immunology ROS: negative  Hematological and Lymphatic ROS: negative  Endocrine ROS: negative  Respiratory ROS: no cough, shortness of breath, or wheezing  Cardiovascular ROS: no chest pain or dyspnea on exertion  Gastrointestinal ROS: no abdominal pain, change in bowel habits, or black or bloody stools  Genito-Urinary ROS: no dysuria, trouble voiding, or hematuria  Musculoskeletal ROS: negative  Neurological ROS: no TIA or stroke symptoms  Dermatological ROS: negative      Objective:     Patient Vitals for the past 8 hrs:   BP Temp Pulse Resp   03/13/21 0730 124/83 97.4 °F (36.3 °C) 91 16       Mental Status exam: WNL except for    Sensorium  Alert and Oriented x 2   Orientation person and place   Relations cooperative   Eye Contact appropriate   Appearance:  age appropriate   Motor Behavior:  within normal limits   Speech:  normal pitch, normal volume and non-pressured Vocabulary average   Thought Process: within normal limits   Thought Content free of delusions and free of hallucinations   Suicidal ideations intention and no plan    Homicidal ideations no plan  and no intention   Mood:  stable   Affect:  stable   Memory recent  adequate   Memory remote:  adequate   Concentration:  adequate   Abstraction:  concrete   Insight:  good   Reliability good   Judgment:  good         Physical Exam:   Visit Vitals  /83 (BP 1 Location: Right arm, BP Patient Position: Sitting)   Pulse 91   Temp 97.4 °F (36.3 °C)   Resp 16   Ht 5' 8\" (1.727 m)   Wt 86.6 kg (191 lb)   BMI 29.04 kg/m²     General appearance: alert, cooperative, no distress, appears stated age  Head: Normocephalic, without obvious abnormality, atraumatic  Eyes: negative  Ears: normal TM's and external ear canals AU  Throat: Lips, mucosa, and tongue normal. Teeth and gums normal  Neck: supple, symmetrical, trachea midline, no adenopathy, thyroid: not enlarged, symmetric, no tenderness/mass/nodules and no JVD  Lungs: clear to auscultation bilaterally  Heart: regular rate and rhythm, S1, S2 normal, no murmur, click, rub or gallop  Abdomen: soft, non-tender. Bowel sounds normal. No masses,  no organomegaly  Extremities: extremities normal, atraumatic, no cyanosis or edema  Pulses: 2+ and symmetric  Skin: Skin color, texture, turgor normal. No rashes or lesions  Lymph nodes: Cervical, supraclavicular, and axillary nodes normal.  Neurologic: Grossly normal        Impression:      Principal Problem:    Major depressive disorder, recurrent episode, severe with anxious distress ([address] (3/12/2021)          Plan:     Recommendations for Treatment/Conditions:  Psychiatric treatment recommended while in hospital  Admit to behavioral health for MDD and SI. Referral To:    Inpatient psychiatric care      Maycol Wong [place]   3/13/2021 2:34 PM

## 2021-03-13 NOTE — PROGRESS NOTES
Problem: Suicide  Goal: *STG: Remains safe in hospital  Outcome: Progressing Towards Goal as evidence by being free from harmful behaviors during this shift. Problem: Suicide  Goal: *STG: Attends activities and groups  Outcome: Progressing Towards Goal as evidence by attending all groups and activities during this shift. Patient has been in day room most of day shift. Patient has sad affect but has attended all groups and activities and has interacted appropriately. Patient has been compliant with scheduled medications and blood glucose checks this shift. Patient has not required insulin and/or other PRN medications. Patient's family dropped off clothing and snacks which were checked by staff and placed in patient's room. Patient has eaten all meals and snacks and has been compliant with unit guidelines, free from falls and harm. Will continue to monitor and provide reassurance and encouragement as appropriate.

## 2021-03-13 NOTE — GROUP NOTE
BRET  GROUP DOCUMENTATION INDIVIDUAL Group Therapy Note Date: 3/13/2021 Group Start Time: 5932 Group End Time: 1200 Group Topic: Nursing [facility] 1 ADULT CHEM DEP Lin Stinson, RN 
 
[facility] GROUP DOCUMENTATION GROUP Group Therapy Note: Awareness of thoughts, feelings, observations, actions and self goals Attendees: 5 Attendance: Attended Patient's Goal:  Understanding of connection between Observations, thoughts, feelings Interventions/techniques: Informed and Supported Follows Directions: Followed directions Interactions: Interacted appropriately Mental Status: Anxious and Congruent Behavior/appearance: Attentive and Cooperative Goals Achieved: Able to engage in interactions, Identified feelings and Increased hopefulness Additional Notes:  Patient voiced continuing to blame self for death of son Michaela Boyd  Vinny Tony

## 2021-03-14 LAB
ALBUMIN SERPL-MCNC: 3.6 G/DL (ref 3.4–5)
ALBUMIN/GLOB SERPL: 0.9 {RATIO} (ref 0.8–1.7)
ALP SERPL-CCNC: 70 U/L (ref 45–117)
ALT SERPL-CCNC: 32 U/L (ref 13–56)
AST SERPL-CCNC: 11 U/L (ref 10–38)
ATRIAL RATE: 69 BPM
BILIRUB DIRECT SERPL-MCNC: 0.2 MG/DL (ref 0–0.2)
BILIRUB SERPL-MCNC: 0.9 MG/DL (ref 0.2–1)
CALCULATED P AXIS, ECG09: 67 DEGREES
CALCULATED R AXIS, ECG10: -36 DEGREES
CALCULATED T AXIS, ECG11: -81 DEGREES
CHOLEST SERPL-MCNC: 190 MG/DL
DIAGNOSIS, 93000: NORMAL
GLOBULIN SER CALC-MCNC: 3.8 G/DL (ref 2–4)
GLUCOSE BLD STRIP.AUTO-MCNC: 115 MG/DL (ref 70–110)
GLUCOSE BLD STRIP.AUTO-MCNC: 79 MG/DL (ref 70–110)
GLUCOSE BLD STRIP.AUTO-MCNC: 81 MG/DL (ref 70–110)
HBA1C MFR BLD: 5.7 % (ref 4.2–5.6)
HDLC SERPL-MCNC: 39 MG/DL (ref 40–60)
HDLC SERPL: 4.9 {RATIO} (ref 0–5)
LDLC SERPL CALC-MCNC: 128.6 MG/DL (ref 0–100)
LIPID PROFILE,FLP: ABNORMAL
P-R INTERVAL, ECG05: 140 MS
PROT SERPL-MCNC: 7.4 G/DL (ref 6.4–8.2)
Q-T INTERVAL, ECG07: 422 MS
QRS DURATION, ECG06: 94 MS
QTC CALCULATION (BEZET), ECG08: 452 MS
TRIGL SERPL-MCNC: 112 MG/DL (ref ?–150)
TSH SERPL DL<=0.05 MIU/L-ACNC: 0.57 UIU/ML (ref 0.36–3.74)
VENTRICULAR RATE, ECG03: 69 BPM
VLDLC SERPL CALC-MCNC: 22.4 MG/DL

## 2021-03-14 PROCEDURE — 83036 HEMOGLOBIN GLYCOSYLATED A1C: CPT

## 2021-03-14 PROCEDURE — 80061 LIPID PANEL: CPT

## 2021-03-14 PROCEDURE — 99231 SBSQ HOSP IP/OBS SF/LOW 25: CPT | Performed by: PSYCHIATRY & NEUROLOGY

## 2021-03-14 PROCEDURE — 80076 HEPATIC FUNCTION PANEL: CPT

## 2021-03-14 PROCEDURE — 36415 COLL VENOUS BLD VENIPUNCTURE: CPT

## 2021-03-14 PROCEDURE — 65220000003 HC RM SEMIPRIVATE PSYCH

## 2021-03-14 PROCEDURE — 74011250637 HC RX REV CODE- 250/637: Performed by: PSYCHIATRY & NEUROLOGY

## 2021-03-14 PROCEDURE — 84443 ASSAY THYROID STIM HORMONE: CPT

## 2021-03-14 PROCEDURE — 82962 GLUCOSE BLOOD TEST: CPT

## 2021-03-14 RX ADMIN — HYDROCHLOROTHIAZIDE 25 MG: 25 TABLET ORAL at 08:50

## 2021-03-14 RX ADMIN — METFORMIN HYDROCHLORIDE 1000 MG: 500 TABLET ORAL at 08:50

## 2021-03-14 RX ADMIN — PANTOPRAZOLE SODIUM 40 MG: 40 TABLET, DELAYED RELEASE ORAL at 08:50

## 2021-03-14 RX ADMIN — ASPIRIN 325 MG ORAL TABLET 325 MG: 325 PILL ORAL at 08:50

## 2021-03-14 RX ADMIN — MELOXICAM 7.5 MG: 7.5 TABLET ORAL at 08:50

## 2021-03-14 RX ADMIN — ASPIRIN 325 MG ORAL TABLET 325 MG: 325 PILL ORAL at 20:50

## 2021-03-14 RX ADMIN — DULOXETINE HYDROCHLORIDE 30 MG: 30 CAPSULE, DELAYED RELEASE ORAL at 08:50

## 2021-03-14 NOTE — BH NOTES
The beginning of Daylight Saving Time occurred at 0200 hrs. Documentation of patient care was done with respect to the time change.

## 2021-03-14 NOTE — ROUTINE PROCESS
Patient has been isolative to her room for majority of the shift . She is alert and oriented x 3. Patient has been open to diabetic  education and has verbalized understanding regarding ADA diet and healthy living skills. Patient denies SI/HI, AH/VH with no safety issues noted. BS  at  read 103, with no ss required. Will continue to monitor for safety with education and support as needed throughout treatment regimen.

## 2021-03-14 NOTE — BH NOTES
MHT NOTE: The pt was out in the milieu for the beginning of the shift sitting quietly to herself reading the newspaper. She is pleasant upon approach. The pt went to her room after lunch and appeared to be asleep for the rest of this shift. The pt attended breakfast, lunch, and groups. She complied with performing her ADL's, abiding by the unit rules, and utilizing the non-skid footwear that was provided for her safety. She did not experience any falls. The pt has not voiced current thoughts of SI or HI. She will continue to be monitored for behavior, location, and safety for the remaining duration of the shift.

## 2021-03-14 NOTE — PROGRESS NOTES
[address] [facility] [address]  Inpatient Progress Note     Date of Service: 03/14/21  Hospital Day: 2     Subjective/Interval History   03/14/21    Treatment Team Notes:  Notes reviewed and/or discussed and report that Fab Montes is a patient with a history of depression recently admitted to our facility with attention being invited to her history and physical exam the same as the dictated admission note which are self-explanatory. Patient interview: Fab Montes was interviewed by this writer today. Treatment with duloxetine was just started. Initial tolerance is appropriate. The patient did mention that she was able to sleep better last night, even though she did not request either trazodone or Vistaril as ordered. It is too soon to determine when the patient will be able to go home with however very possibly this will be a short admission with outpatient treatment referral.      Objective     Visit Vitals  BP (!) 141/84 (BP 1 Location: Right arm, BP Patient Position: Sitting)   Pulse 96   Temp 97.6 °F (36.4 °C)   Resp 17   Ht 5' 8\" (1.727 m)   Wt 86.6 kg (191 lb)   BMI 29.04 kg/m²     Blood pressure remains elevated, the patient is receiving hydrochlorothiazide daily. Will observe for now however if it remains elevated she may require another antihypertensive to be prescribed.     Recent Results (from the past 24 hour(s))   GLUCOSE, POC    Collection Time: 03/13/21  4:10 PM   Result Value Ref Range    Glucose (POC) 84 70 - 110 mg/dL   GLUCOSE, POC    Collection Time: 03/13/21  7:52 PM   Result Value Ref Range    Glucose (POC) 103 70 - 110 mg/dL   GLUCOSE, POC    Collection Time: 03/14/21  6:33 AM   Result Value Ref Range    Glucose (POC) 115 (H) 70 - 110 mg/dL   HEPATIC FUNCTION PANEL    Collection Time: 03/14/21  6:46 AM   Result Value Ref Range    Protein, total 7.4 6.4 - 8.2 g/dL    Albumin 3.6 3.4 - 5.0 g/dL    Globulin 3.8 2.0 - 4.0 g/dL    A-G Ratio 0.9 0.8 - 1.7      Bilirubin, total 0.9 0.2 - 1.0 MG/DL    Bilirubin, direct 0.2 0.0 - 0.2 MG/DL    Alk. phosphatase 70 45 - 117 U/L    AST (SGOT) 11 10 - 38 U/L    ALT (SGPT) 32 13 - 56 U/L   HEMOGLOBIN A1C W/O EAG    Collection Time: 03/14/21  6:46 AM   Result Value Ref Range    Hemoglobin A1c 5.7 (H) 4.2 - 5.6 %   LIPID PANEL    Collection Time: 03/14/21  6:46 AM   Result Value Ref Range    LIPID PROFILE          Cholesterol, total 190 <200 MG/DL    Triglyceride 112 <150 MG/DL    HDL Cholesterol 39 (L) 40 - 60 MG/DL    LDL, calculated 128.6 (H) 0 - 100 MG/DL    VLDL, calculated 22.4 MG/DL    CHOL/HDL Ratio 4.9 0 - 5.0     TSH 3RD GENERATION    Collection Time: 03/14/21  6:46 AM   Result Value Ref Range    TSH 0.57 0.36 - 3.74 uIU/mL     Above results noted. The patient's diabetes is well controlled with current dose of Metformin. Very possibly will be able to reduce her Accu-Cheks to twice daily. After a while once a day may only need to be required. Mental Status Examination     Appearance/Hygiene 61 y.o.  BLACK/ female  Hygiene: Fair   Behavior/Social Relatedness Appropriate   Musculoskeletal Gait/Station: appropriate  Tone (flaccid, cogwheeling, spastic): not assessed  Psychomotor (hyperkinetic, hypokinetic): calmer   Involuntary movements (tics, dyskinesias, akathisa, stereotypies): none   Speech   Rate, rhythm, volume, fluency and articulation are appropriate   Mood   depressed   Affect    appropriate to situation   Thought Process Linear and goal directed   Thought Content and Perceptual Disturbances Denies self-injurious behavior (SIB), suicidal ideation (SI), aggressive behavior or homicidal ideation (HI)    Denies auditory and visual hallucinations   Sensorium and Cognition  Grossly intact   Insight  fair   Judgment  fair        Assessment/Plan      Psychiatric Diagnoses:   Patient Active Problem List   Diagnosis Code    Arthritis of knee, right M17.11    Failed total knee replacement ([facility] [address] T84.018A, Z96.650    Suicidal ideation R45.851    Major depressive disorder, recurrent episode, severe with anxious distress ([facility] [address] F33.2       Medical Diagnoses: As above    Psychosocial and contextual factors: Same    Level of impairment/disability: Moderately severe    1. The patient's blood pressure may be elevated by current prescription for duloxetine. Will observe for a while, however the pros and cons to continue with duloxetine will have to be evaluated. 2.  Reviewed instructions, risks, benefits and side effects of medications  3.   Disposition/Discharge Date: self-care/home, hopefully in a couple of days with outpatient treatment referral.    Donita Cheng MD, [address] [place] [address]

## 2021-03-14 NOTE — GROUP NOTE
IP  GROUP DOCUMENTATION INDIVIDUAL Group Therapy Note Date: 3/14/2021 Group Start Time: 9243 Group End Time: 0225 Group Topic: Nursing [facility] ADULT CHEM DEP Dwayne Villalobos, RN 
 
IP  GROUP DOCUMENTATION GROUP Group Therapy Note: Patient's were educated on importance of creating a safety plan prior to discharge. Attendees: 5 Attendance: Did not attend Additional Notes:  Patient declined group due to not feeling well Mirna Parra

## 2021-03-14 NOTE — PROGRESS NOTES
Problem: Suicide  Goal: *STG: Remains safe in hospital  Outcome: Progressing Towards Goal  Goal: *STG/LTG: Complies with medication therapy  Outcome: Progressing Towards Goal     Problem: Suicide  Goal: *STG: Attends activities and groups  Outcome: Not Progressing Towards Goal    Alis New has eaten a portion of each meal, has not attended group, and remains withdrawn in her room. She is compliant with medications. Will continue to provide support as needed.

## 2021-03-14 NOTE — H&P
[facility] HISTORY AND PHYSICAL    Name:  Alejandro Bhat  MR#:   [id number]  :  1960  ACCOUNT #:  [de-identified]  ADMIT DATE:  2021      IDENTIFYING INFORMATION:  The patient is a 61-year-old female admitted to this facility on a voluntary basis on the above-mentioned date. BASIS FOR ADMISSION:  The patient indicated that she had been to her outpatient appointment with Dr. Theo Jimenes, an orthopedic surgeon in the area, whom had to help her with difficulties that she has had with both of her knees, but mostly her right knee, she described having required to have surgeries including a full knee replacement. Regardless, at the time of the appointment, Dr. Theo Jimenes became concerned, concerns have to do with what she described being her depression and how severe it was. The patient who is very active and described increased difficulties with her activity level and becoming, as a result, increasingly depressed, described the presence of suicidal thoughts. Very appropriately, Dr. Theo Jimenes referred the patient to [facility], a place where she required to have medical clearance prior to her being accepted for inpatient psychiatric care. After doing so, the case was discussed with undersigned with acceptance for admission to the adult open unit here at [address] [place] [address] happening shortly thereafter and so the basis for this admission. PSYCHIATRIC HISTORY:  The patient described that she has had problems with her knees for a while now; however, her right knee difficulties are worse than the one that she has had with her left. She had initially arthroscopic surgery to her right knee, she said, and then later on had a knee replacement; however, it appears, by her description, that a revision of the right knee replacement was required by Dr. Theo Jimenes who began to see the patient then.   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 In addition to becoming depressed as a result of the same, the patient also described that 3 years ago, she came home and found her son  from what appeared to be diabetic complications. Living alone, the patient who has several children mentioned that one of her daughters had been staying with her recently due to safety concerns. It is also to be mentioned that the patient's depression was considered to be rather severe. Feeling helpless and hopeless, she got to the point sometime prior to this admission of grabbing a knife and thinking that she was going to stab herself to death. Her depression is also characterized by increased anxiety levels and to some degree some obsessive thinking, but also the presence of vegetative signs in the form of her sleeping difficulties and some eating difficulties. She denies however having any psychotic symptoms associated to her symptoms of depression. MEDICAL HISTORY:  As above stated, problems with what appears to be arthritic changes on her knees with chronic pain. The patient has had a history of dyslipidemia, reflux esophagitis, and also hypertension. She is status post C-sections x4 and had a right knee arthroscopic surgery, a right knee replacement and revision of the replacement some time later. She is status post bilateral tubal ligation. ALLERGIES:  THE PATIENT DENIES ANY MEDICATIONS AND/OR FOOD RELATED ALLERGIES. REVIEW OF SYSTEMS:  From a musculoskeletal point of view, remarkable for history of bilateral knee pain requiring surgery to her right knee as above stated. From a psychiatric point of view, the presence of depression with suicidal ideations to the point of being out of control. Otherwise, the review of systems is negative.     Multiple labs were performed at the time of the patient's evaluation in the emergency department including a CBC with differential that showed completely normal test results. A urinalysis showed negative results with mild elevation of urobilinogen to 2.0. Blood chemistry showed a sodium of 140, potassium of 3.4, chloride of 107, blood sugar elevated to 121. BUN was 9, creatinine 0.80, estimated GFR above 60 mL/min. The patient had an A1c in 2020, was 4.9%, in 2019 was 6.7%. There is nothing in the patient's chart that is current. The last electrocardiogram was also mentioned to be in 2019 when she had basically a normal electrocardiogram.    ALCOHOL AND DRUG HISTORY:  Negative for alcohol, illicit substances, abusing over-the-counter medications, or prescription medications. PERSONAL HISTORY AND FAMILY HISTORY:  The patient described there is a history of depression in her family, specifically her mother whom it was described as having \"a couple of breakdowns. \"  One of her sisters also is described as depressed. The patient had a total of 5 sisters, one of them is  and 2 brothers, one of them is also . Herself, she had 2 daughters and 2 sons and again one of her sons  a few years ago from what appears to be complications of diabetes mellitus. The patient is the one who found him  in his room. The patient is still ; however, she has been  from her  for 20 years. Her children are very supportive of her since one of her daughters has moved in with her as I have above indicated. The patient described that working is not only for her to help economically, but also as a way to distract herself from thinking about her son and her symptoms of depression. MENTAL STATUS EXAM:  This is a female who looks her stated age. There is no evidence of alcohol or any other type of drug-related signs of intoxication or withdrawal symptoms.   The patient is coherent, shows quality of continuity of associations without evidence of flight of ideas, pressure of speech, ideas of reference or any influence or any hallucinatory process. She described herself as feeling helpless and hopeless, she is having problems with anhedonia and also vegetative signs in the form of her sleeping difficulties and decreased appetite. She also described recurrent suicidal thoughts with potential for control loss being noted some time prior to admission. There is no evidence of danger to others. Cognition is intact. Memory is intact. Insight and judgment is currently appropriate. CLINICAL IMPRESSION:  AXIS I:   Major depressive disorder, recurrent, without psychotic symptoms. AXIS II:  Noncontributory. AXIS III:  History of knee pain, right worse than left. Status post multiple right knee surgeries including arthroscopic surgery, knee replacement, and revision of the total knee. Hypertension by history. Reflux esophagitis by history. Status post multiple surgeries including status post C-sections x4. Status post right knee arthroscopic surgery, right knee replacement and right knee replacement revision. Status post bilateral tubal ligation. TREATMENT PLAN:  1. The patient was admitted to the adult program, will be seen daily and will be referred to the groups within context of the program.  2.  She will have an assigned inpatient  to help with coordinating information to the family and also coordinating outpatient care. 3.  The patient's medications were reviewed with her with detail specifically as to which antidepressant she is being able to be prescribed. Initially, we will consider prescription for citalopram; however, due to the patient's history of pain rather a switch to treating her with duloxetine (Cymbalta) initial dose of 30 mg to be started tomorrow morning. Hopefully, the Cymbalta is going to help not only with the patient's depression, but also the severity of her pain.   Otherwise we will maintain the same type of medications that she is being prescribed for the treatment of her hypertension and also for treatment of her diabetes. Treatment for her reflux esophagitis will be also maintained. 4.  For sleeping difficulties, trazodone has been prescribed as a p.r.n. and for her anxiety, Vistaril will be also prescribed 50 mg every 4-6 hours as needed. ESTIMATED LENGTH OF STAY AND PROGNOSIS:  ELOS is 5 days. Prognosis is good with treatment response and treatment compliance. Manda Blake MD, LFAPA      [id number]  D:  03/13/2021 10:40  T:  03/13/2021 13:59  JOB #:  [id number]    Behavioral Services  Medicare Certification Upon Admission    I certify that this patient's inpatient psychiatric hospital admission is medically necessary for:      [x] Treatment which could reasonably be expected to improve this patient's condition,       [] For diagnostic study;     AND     [x] The inpatient psychiatric services are provided while the individual is under the care of a physician and are included in the individualized plan of care. Estimated length of stay/service: 5 days. Plan for post-hospital care: OP f/u.     Electronically signed by @[facility]@ on 3/14/2021 at 8:59 AM

## 2021-03-15 LAB
GLUCOSE BLD STRIP.AUTO-MCNC: 121 MG/DL (ref 70–110)
GLUCOSE BLD STRIP.AUTO-MCNC: 87 MG/DL (ref 70–110)
GLUCOSE BLD STRIP.AUTO-MCNC: 94 MG/DL (ref 70–110)

## 2021-03-15 PROCEDURE — 74011250637 HC RX REV CODE- 250/637: Performed by: PSYCHIATRY & NEUROLOGY

## 2021-03-15 PROCEDURE — 99232 SBSQ HOSP IP/OBS MODERATE 35: CPT | Performed by: PSYCHIATRY & NEUROLOGY

## 2021-03-15 PROCEDURE — 65220000003 HC RM SEMIPRIVATE PSYCH

## 2021-03-15 PROCEDURE — 82962 GLUCOSE BLOOD TEST: CPT

## 2021-03-15 RX ORDER — DULOXETIN HYDROCHLORIDE 30 MG/1
30 CAPSULE, DELAYED RELEASE ORAL
Status: DISCONTINUED | OUTPATIENT
Start: 2021-03-15 | End: 2021-03-17 | Stop reason: HOSPADM

## 2021-03-15 RX ADMIN — HYDROCHLOROTHIAZIDE 25 MG: 25 TABLET ORAL at 08:31

## 2021-03-15 RX ADMIN — PANTOPRAZOLE SODIUM 40 MG: 40 TABLET, DELAYED RELEASE ORAL at 08:30

## 2021-03-15 RX ADMIN — TRAZODONE HYDROCHLORIDE 50 MG: 50 TABLET ORAL at 20:17

## 2021-03-15 RX ADMIN — MELOXICAM 7.5 MG: 7.5 TABLET ORAL at 08:31

## 2021-03-15 RX ADMIN — DULOXETINE HYDROCHLORIDE 30 MG: 30 CAPSULE, DELAYED RELEASE ORAL at 20:17

## 2021-03-15 RX ADMIN — ASPIRIN 325 MG ORAL TABLET 325 MG: 325 PILL ORAL at 08:30

## 2021-03-15 RX ADMIN — METFORMIN HYDROCHLORIDE 1000 MG: 500 TABLET ORAL at 08:32

## 2021-03-15 RX ADMIN — DULOXETINE HYDROCHLORIDE 30 MG: 30 CAPSULE, DELAYED RELEASE ORAL at 08:30

## 2021-03-15 RX ADMIN — ASPIRIN 325 MG ORAL TABLET 325 MG: 325 PILL ORAL at 20:17

## 2021-03-15 RX ADMIN — METFORMIN HYDROCHLORIDE 1000 MG: 500 TABLET ORAL at 16:18

## 2021-03-15 NOTE — BH NOTES
Rajiv Route has been withdrawn in her room this evening. When she came to have her sugar checked, she stated she hadn't eaten much today has her stomach felt \"upset\". Pt provided with two orange juices and asked to drink to maintain blood sugar. She is compliant with medications. Will continue to provide support as needed.

## 2021-03-15 NOTE — BSMART NOTE
ART THERAPY GROUP PROGRESS NOTE PATIENT SCHEDULED FOR GROUP AT: 370 ATTENDANCE: 3/4 PARTICIPATION LEVEL: Participates fully in the art process ATTENTION LEVEL : Able to focus on task FOCUS: Treatment goals SYMBOLIC & THEMATIC CONTENT AS NOTED IN IMAGERY: She was calm and compliant. She was engaged and invested in the task at hand. Her approach was organized. She was pulled from group art therapy to meet with her doctor and returned to group art therapy. She claimed that she can achieve her treatment goals by focusing \"on [herself]\" and getting re-involved with her [employer] and previous volunteer opportunities. She also discussed the loss of her son and how she has coped with her grief. Art Therapy Intern administered group art therapy.

## 2021-03-15 NOTE — PROGRESS NOTES
[address]  Inpatient Progress Note     Date of Service: 03/15/21  Hospital Day: 3     Subjective/Interval History   03/15/21    Treatment Team Notes:  Notes reviewed and/or discussed and report that Sabine Kuo is a patient with a history of depression, multifactorial, admitted to our facility this weekend. Please be referred to the dictated admission note and history and physical exam which are self-explanatory. Patient interview: Sabine Kuo was interviewed by this writer today. During our session today the patient continues to describe her being depressed, however she is also still being that she does not feel as helpless and hopeless as she was when she was admitted. She did describe during our session, the presence of drowsiness this morning. She believes that this is related to her current prescription for duloxetine which is being prescribed 30 mg every morning. So a switch of her duloxetine schedule to bedtime followed. Since she is taking a very low dose of duloxetine we will dispense duloxetine to the patient tonight, at the same dose of 30 mg. Further discussion regarding medications related with antidepressant treatment, the need to provide them with time to be able to work, (at least 4 to 6 weeks plus), and the need for the dose to be correct, brought up this morning. Almost upon admission, the patient has been bringing up her need to be discharged as she wants to go home to stay with her daughter who recently moved to stay with her to provide support. It is very possible that this will  happen in a day or 2. Otherwise she will require outpatient care which will be very glad to provide care at [facility] in the form of individual sessions the same as medications follow-up appointments.       Objective     Visit Vitals  /80   Pulse (!) 105   Temp 97 °F (36.1 °C)   Resp 17   Ht 5' 8\" (1.727 m)   Wt 86.6 kg (191 lb)   SpO2 98% BMI 29.04 kg/m²     Vitals are stable, mild increase of her heart rate noted above. Her heart rate has been lower before. We will continue to observe. Recent Results (from the past 24 hour(s))   GLUCOSE, POC    Collection Time: 03/14/21  4:11 PM   Result Value Ref Range    Glucose (POC) 79 70 - 110 mg/dL   GLUCOSE, POC    Collection Time: 03/14/21  8:49 PM   Result Value Ref Range    Glucose (POC) 81 70 - 110 mg/dL   GLUCOSE, POC    Collection Time: 03/15/21  6:41 AM   Result Value Ref Range    Glucose (POC) 87 70 - 110 mg/dL   GLUCOSE, POC    Collection Time: 03/15/21 10:55 AM   Result Value Ref Range    Glucose (POC) 121 (H) 70 - 110 mg/dL     Above results noted. We will reduce Accu-Cheks to only twice a day. Mental Status Examination     Appearance/Hygiene 61 y.o. BLACK/ female  Hygiene: Fair   Behavior/Social Relatedness Appropriate   Musculoskeletal Gait/Station: appropriate  Tone (flaccid, cogwheeling, spastic): not assessed  Psychomotor (hyperkinetic, hypokinetic): calm   Involuntary movements (tics, dyskinesias, akathisa, stereotypies): none   Speech   Rate, rhythm, volume, fluency and articulation are appropriate   Mood   described her depression is improving   Affect    appropriate to situation   Thought Process Linear and goal directed   Thought Content and Perceptual Disturbances  denies suicidal thoughts. No evidence of psychotic symptoms described or noted.    Sensorium and Cognition  Grossly intact   Insight  improving   Judgment  improving        Assessment/Plan      Psychiatric Diagnoses:   Patient Active Problem List   Diagnosis Code    Arthritis of knee, right M17.11    Failed total knee replacement (HCC) T84.018A, Z96.659    Suicidal ideation R45.851    Major depressive disorder, recurrent episode, severe with anxious distress ([facility] [address] F33.2       Medical Diagnoses: Same    Psychosocial and contextual factors: Same    Level of impairment/disability: Moderately severe 1. Duloxetine dose has been changed to bedtime only. Initial tolerance is fairly good with exception of the patient describing the presence of drowsiness associated to the treatment with the antidepressant. By changing it to nighttime we hope that the patient will be able not only to sleep better, but also will become more active during the day. 2.  Reviewed instructions, risks, benefits and side effects of medications  3.   Disposition/Discharge Date: self-care/home, possibly in the couple of days with outpatient follow-up at [facility], in addition to continuing with outpatient treatment with Dr. Aiyana Wheeler at [address], [place], [address] [place] [address]  Psychiatry

## 2021-03-15 NOTE — BSMART NOTE
OCCUPATIONAL THERAPY PROGRESS NOTE Group Time:  1287 Attendance: The patient attended full group. Participation: The patient participated fully in the activity. Attention: The patient was able to focus on the activity. Interaction: The patient occasionally  interacts with others. Identified goal/change for self to say \"no\" to people when she is asked to do things and ends up cancelling her own plans, set better boundaries.

## 2021-03-15 NOTE — BSMART NOTE
[facility] Biopsychosocial Assessment Current Level of Psychosocial Functioning  
 
[x]Independent 
[]Dependent []Minimal Assist 
 
 
Comments:   
 
Psychosocial High Risk Factors (check all that apply) []Unable to obtain meds                                                              
[x]Chronic illness/pain - knee pain []Substance abuse  
[]Lack of Family Support []Financial stress []Isolation []Inadequate [employer] [x]Suicide ideation []Not taking medications []Victim of crime []Developmental Delay 
[]Unable to manage personal needs []Age 72 or older  
[]  Homeless []F[place] transportation []Readmission within 30 days [x]Unemployment for now due to knee injury since 2020 [x]Traumatic Event - found son  x3 yrs ago Psychiatric Advanced Directive: none Family to involve in treatment: daughters Sexual Orientation:  heterosexual 
 
Patient Strengths: motivated, wants help, supportive family Patient Barriers: poor sleep & eating habits, isolates, family hx of depression, diabetic, no motivation, physical health, Covid-19 CD education provided: in groups & activities Safety plan: contract with nursing staff CMHC/MH history: limited outpt tx Plan of Care: 
medication management, group/individual therapies, family meetings, psycho -education, treatment team meetings to assist with stabilization Initial Discharge Plan:  Probably [address] Clinical Summary: This x63 yr old AA female admitted after increased suicidal ideation, unable to work since 2020 due to knee surgery and having found her son  about x3 yrs ago. Increased periods of depression & anxiety, with some possible AH of her  mother. She has supportive daughters. No prior mental health services. Lives alone. Poor daily schedule.  
 
CLINICAL IMPRESSION: 
AXIS I:   Major depressive disorder, recurrent, without psychotic symptoms. AXIS II:  Noncontributory. AXIS III:  History of knee pain, right worse than left. Status post multiple right knee surgeries including arthroscopic surgery, knee replacement, and revision of the total knee. Hypertension by history. Reflux esophagitis by history. Status post multiple surgeries including status post C-sections x4. Status post right knee arthroscopic surgery, right knee replacement and right knee replacement revision. Status post bilateral tubal ligation. Intervention:  Reviewed with pt structure of tx team as well as [facility] adult unit & related tx expectations. Pt agreed to cooperate. Nothing else remarkable to add to staff assessments. Did also explain use of CBT tx and value of groups / activities to help provide structure, relief & support. Pt verbalizes commitment to comply. Dr & tx team updated.

## 2021-03-15 NOTE — PROGRESS NOTES
Problem: Suicide  Goal: *STG: Remains safe in hospital  Outcome: Progressing Towards Goal  Goal: *STG: Seeks staff when feelings of self harm or harm towards others arise  Outcome: Progressing Towards Goal  Goal: *STG/LTG: Complies with medication therapy  Outcome: Progressing Towards Goal     Pt presents with dull affect, depressed mood. Pt has been sociable on the unit. Pt states, \"I feel better today. I just feel a little drowsy. \" Pt is participative in groups and is adherent with unit guidelines. Pt denies SI/HI at this time. Pt is medication compliant. Will continue to monitor.

## 2021-03-15 NOTE — BSMART NOTE
SOCIAL WORK GROUP THERAPY PROGRESS NOTE Group Time:  10:45am    
 
Group Topic:  Coping Skills    C D Issues Group Participation:   
 
Pt moderately involved during group discussion but remained attentive. Still dysphoric. Listened but no self disclosure. \"Seven Steps\" for taking responsibility for our Happiness was reviewed including commitment to change, self-care, setting limits, goal setting & letting go. Agreed with peers about general symptoms of depression & anxiety.

## 2021-03-16 LAB
GLUCOSE BLD STRIP.AUTO-MCNC: 85 MG/DL (ref 70–110)
GLUCOSE BLD STRIP.AUTO-MCNC: 93 MG/DL (ref 70–110)
GLUCOSE BLD STRIP.AUTO-MCNC: 93 MG/DL (ref 70–110)

## 2021-03-16 PROCEDURE — 65220000003 HC RM SEMIPRIVATE PSYCH

## 2021-03-16 PROCEDURE — 74011250637 HC RX REV CODE- 250/637: Performed by: PSYCHIATRY & NEUROLOGY

## 2021-03-16 PROCEDURE — 99232 SBSQ HOSP IP/OBS MODERATE 35: CPT | Performed by: PSYCHIATRY & NEUROLOGY

## 2021-03-16 PROCEDURE — 82962 GLUCOSE BLOOD TEST: CPT

## 2021-03-16 RX ADMIN — ASPIRIN 325 MG ORAL TABLET 325 MG: 325 PILL ORAL at 08:09

## 2021-03-16 RX ADMIN — HYDROCHLOROTHIAZIDE 25 MG: 25 TABLET ORAL at 08:08

## 2021-03-16 RX ADMIN — ASPIRIN 325 MG ORAL TABLET 325 MG: 325 PILL ORAL at 20:21

## 2021-03-16 RX ADMIN — DULOXETINE HYDROCHLORIDE 30 MG: 30 CAPSULE, DELAYED RELEASE ORAL at 20:21

## 2021-03-16 RX ADMIN — METFORMIN HYDROCHLORIDE 1000 MG: 500 TABLET ORAL at 08:08

## 2021-03-16 RX ADMIN — METFORMIN HYDROCHLORIDE 1000 MG: 500 TABLET ORAL at 16:25

## 2021-03-16 RX ADMIN — PANTOPRAZOLE SODIUM 40 MG: 40 TABLET, DELAYED RELEASE ORAL at 08:09

## 2021-03-16 RX ADMIN — MELOXICAM 7.5 MG: 7.5 TABLET ORAL at 08:09

## 2021-03-16 NOTE — BSMART NOTE
ART THERAPY GROUP PROGRESS NOTE PATIENT SCHEDULED FOR GROUP AT: 950 ATTENDANCE: 3/4 PARTICIPATION LEVEL:  Participates fully in the art process ATTENTION LEVEL : Able to focus on task FOCUS: Grounding SYMBOLIC & THEMATIC CONTENT AS NOTED IN IMAGERY: She was calm, compliant, and invested in the task at hand. She appeared tired and claimed that she \"still felt a little drowsy from the medicine. \"Her approach to task was planned-out and purposeful. She was called out to meet with her doctor for about 1/4 of group. She claimed that she is rather thankful for her family support during group discussion.

## 2021-03-16 NOTE — DISCHARGE SUMMARY
[address]  Inpatient Progress Note     Date of Service: 03/16/21  Hospital Day: 4     Subjective/Interval History   03/16/21    Treatment Team Notes:  Notes reviewed and/or discussed and report that An Aj is a patient with a history of a depressive disorder, suicidal upon admission, however showing positive treatment response. Patient interview: An Aj was interviewed by this writer today. During our session today the patient described her depression is beginning to do better. She appears to be more stable regarding her anxiety and is currently tolerating her prescription for duloxetine fairly well. She did describe mild drowsiness this morning however we are hopeful that the drowsiness the patient described is going to be temporary. It is to be mentioned that the current dose of duloxetine is not therapeutic. She will require dose increased to probably 60 mg every night however this could be done after the patient is discharged tomorrow. The patient is currently denying any thoughts of self-harm and there has been no evidence of thoughts of harming others, psychotic symptoms, or cognitive impairment. The patient is also agreeable to go home and to see us as an outpatient, the same as to continue with the care provided by Dr. Ella Hebert and also with her primary care physician Dr. Abelino Wise.       Objective     Visit Vitals  /78 (BP 1 Location: Left upper arm, BP Patient Position: At rest)   Pulse (!) 103   Temp 98.6 °F (37 °C)   Resp 20   Ht 5' 8\" (1.727 m)   Wt 86.6 kg (191 lb)   SpO2 98%   BMI 29.04 kg/m²     Vitals are stable    Recent Results (from the past 24 hour(s))   GLUCOSE, POC    Collection Time: 03/15/21  4:03 PM   Result Value Ref Range    Glucose (POC) 94 70 - 110 mg/dL   GLUCOSE, POC    Collection Time: 03/16/21  6:33 AM   Result Value Ref Range    Glucose (POC) 93 70 - 110 mg/dL   GLUCOSE, POC    Collection Time: 03/16/21 11:09 AM   Result Value Ref Range    Glucose (POC) 93 70 - 110 mg/dL     Above results noted. Mental Status Examination     Appearance/Hygiene 61 y.o. BLACK/ female  Hygiene: Fair   Behavior/Social Relatedness Appropriate   Musculoskeletal Gait/Station: appropriate  Tone (flaccid, cogwheeling, spastic): not assessed  Psychomotor (hyperkinetic, hypokinetic): calm   Involuntary movements (tics, dyskinesias, akathisa, stereotypies): none   Speech   Rate, rhythm, volume, fluency and articulation are appropriate   Mood   depression is improving   Affect    appropriate to situation   Thought Process Linear and goal directed   Thought Content and Perceptual Disturbances Denies self-injurious behavior (SIB), suicidal ideation (SI), aggressive behavior or homicidal ideation (HI)    Denies auditory and visual hallucinations   Sensorium and Cognition  Grossly intact   Insight  improved   Judgment  improved        Assessment/Plan      Psychiatric Diagnoses:   Patient Active Problem List   Diagnosis Code    Arthritis of knee, right M17.11    Failed total knee replacement ([facility] T84.018A, Z96.659    Suicidal ideation R45.851    Major depressive disorder, recurrent episode, severe with anxious distress ([facility] F33.2       Medical Diagnoses: Same    Psychosocial and contextual factors: Same    Level of impairment/disability: Moderate    1. we are planning to discharge the patient tomorrow as I had above-stated. She is agreeable to be followed psychiatrically at [address] [place] [address] psychiatric Associates with a referral to Francy Andrade, [address] [place] [address] for therapy and medications follow-up appointments with the undersigned. Her daughter is staying at home with her and will be there for a while. That will provide the patient with the support she requires. 2.  Reviewed instructions, risks, benefits and side effects of medications  3. Disposition/Discharge Date: self-care/home, tomorrow.     Roxana Roberts MD, [facility]

## 2021-03-16 NOTE — BH NOTES
Pt presents with bright affect, depressed mood. Pt has been sociable with her peers on the unit, watching television in the day area for much of the afternoon. After dinner, pt has been resting in her room stating, \"This rain is making me feel sleepy today. \" Pt is being treated per glucose monitoring protocol, but did not require sliding scale coverage. Pt denies SI/HI at this time. Pt is medication compliant. Will continue to monitor.

## 2021-03-16 NOTE — BSMART NOTE
SW Contact Summary: This x63 yr old AA female admitted after increased suicidal ideation, unable to work since 2020 due to knee surgery and having found her son  about x3 yrs ago. Increased periods of depression & anxiety, with some possible AH of her  mother. She has supportive daughters. No prior mental health services. Pt reports feeling brighter but still lethargic. Denies suicidal ideation. Gaining insight into CBT principles. Has started attending more groups. Intervention: pt is gaining insight into how manage moods better. She's agreed to continue outpt tx at [facility] with Dr Anupama Baron RN,LPC (see fyi for details). MAY NEED MEDICAID CAB. Dr & tx team updated

## 2021-03-16 NOTE — GROUP NOTE
[facility]  GROUP DOCUMENTATION INDIVIDUAL Group Therapy Note Date: 3/16/2021 Group Start Time: 0800 Group End Time: 7278 Group Topic: Nursing [address] 1 ADULT CHEM DEP Kathy QUEEN  GROUP DOCUMENTATION GROUP Group Therapy Note Attendees: 5 Attendance: Attended Patient's Goal:   Unit Guidelines Interventions/techniques: Informed Follows Directions: Followed directions Interactions: Interacted appropriately Mental Status: Calm Behavior/appearance: Cooperative Goals Achieved: Able to manage/cope with feelings Terri Cummings

## 2021-03-16 NOTE — GROUP NOTE
[facility] GROUP DOCUMENTATION INDIVIDUAL Group Therapy Note Date: 3/15/2021 Group Start Time: 2000 Group End Time: 2030 Group Topic: Nursing [facility] 1 ADULT CHEM DEP Mark Hayes RN 
 
[facility] GROUP DOCUMENTATION GROUP Group Therapy Note Attendees: 8 Attendance: Attended Patient's Goal:  Understanding medication as prescribed and daily reflection. Interventions/techniques: Informed, Validated, Provide feedback and Reinforced Follows Directions: Followed directions Interactions: Interacted appropriately Mental Status: Flat Behavior/appearance: Cooperative Goals Achieved: Able to reflect/comment on own behavior and Able to receive feedback Additional Notes:  Pt isolated to room. Denied discomfort. Medication compliant. Pt's sleep pattern was assessed and she reported difficulty staying asleep. PRN medication offered and accepted to assist with sleep. Patient was able to reflect on her day and reported, \"I had a good day. How about you? \"  Patient denied SI/HI or AVH at current. Appeared pleasant but sad. Will continue to monitor and encourage more socialization in community area.   
 
Roberto Medina RN

## 2021-03-16 NOTE — PROGRESS NOTES
Problem: Suicide  Goal: *STG: Remains safe in hospital  Outcome: Progressing Towards Goal  Goal: *STG/LTG: Complies with medication therapy  Outcome: Progressing Towards Goal     Problem: Falls - Risk of  Goal: *Absence of Falls  Description: Document Ronaldo Fall Risk and appropriate interventions in the flowsheet every shift throughout hospitalization, and will be free of falls throughout hospitalization. Outcome: Progressing Towards Goal  Note: Fall Risk Interventions:            Medication Interventions: Teach patient to arise slowly       Pt presents with dull affect, depressed mood. Pt has been sociable on the unit. Pt states, \"I feel much better today. \" Pt is participative in groups and is adherent with unit guidelines. Pt denies SI/HI at this time. Pt is being treated per glucose monitoring protocol, and has not required sliding scale insulin this morning. Pt is medication compliant. Will continue to monitor.

## 2021-03-17 VITALS
BODY MASS INDEX: 28.95 KG/M2 | RESPIRATION RATE: 20 BRPM | SYSTOLIC BLOOD PRESSURE: 130 MMHG | OXYGEN SATURATION: 98 % | HEART RATE: 102 BPM | HEIGHT: 68 IN | DIASTOLIC BLOOD PRESSURE: 90 MMHG | TEMPERATURE: 97.2 F | WEIGHT: 191 LBS

## 2021-03-17 LAB — GLUCOSE BLD STRIP.AUTO-MCNC: 83 MG/DL (ref 70–110)

## 2021-03-17 PROCEDURE — 82962 GLUCOSE BLOOD TEST: CPT

## 2021-03-17 PROCEDURE — 90471 IMMUNIZATION ADMIN: CPT

## 2021-03-17 PROCEDURE — 74011250637 HC RX REV CODE- 250/637: Performed by: PSYCHIATRY & NEUROLOGY

## 2021-03-17 PROCEDURE — 74011250636 HC RX REV CODE- 250/636: Performed by: PSYCHIATRY & NEUROLOGY

## 2021-03-17 PROCEDURE — 90686 IIV4 VACC NO PRSV 0.5 ML IM: CPT | Performed by: PSYCHIATRY & NEUROLOGY

## 2021-03-17 RX ORDER — TRAZODONE HYDROCHLORIDE 50 MG/1
50 TABLET ORAL
Qty: 15 TAB | Refills: 1 | Status: SHIPPED | OUTPATIENT
Start: 2021-03-17

## 2021-03-17 RX ORDER — METFORMIN HYDROCHLORIDE 1000 MG/1
1000 TABLET ORAL 2 TIMES DAILY WITH MEALS
Qty: 60 TAB | Refills: 0 | Status: SHIPPED | OUTPATIENT
Start: 2021-03-17

## 2021-03-17 RX ORDER — DULOXETIN HYDROCHLORIDE 30 MG/1
30 CAPSULE, DELAYED RELEASE ORAL 2 TIMES DAILY
Qty: 60 CAP | Refills: 0 | Status: SHIPPED | OUTPATIENT
Start: 2021-03-17

## 2021-03-17 RX ORDER — PANTOPRAZOLE SODIUM 40 MG/1
40 TABLET, DELAYED RELEASE ORAL
Qty: 30 TAB | Refills: 0 | Status: SHIPPED | OUTPATIENT
Start: 2021-03-18

## 2021-03-17 RX ORDER — HYDROCHLOROTHIAZIDE 25 MG/1
25 TABLET ORAL DAILY
Qty: 30 TAB | Refills: 0 | Status: SHIPPED | OUTPATIENT
Start: 2021-03-18

## 2021-03-17 RX ADMIN — ASPIRIN 325 MG ORAL TABLET 325 MG: 325 PILL ORAL at 08:40

## 2021-03-17 RX ADMIN — PANTOPRAZOLE SODIUM 40 MG: 40 TABLET, DELAYED RELEASE ORAL at 06:31

## 2021-03-17 RX ADMIN — MELOXICAM 7.5 MG: 7.5 TABLET ORAL at 08:40

## 2021-03-17 RX ADMIN — METFORMIN HYDROCHLORIDE 1000 MG: 500 TABLET ORAL at 08:40

## 2021-03-17 RX ADMIN — HYDROCHLOROTHIAZIDE 25 MG: 25 TABLET ORAL at 08:40

## 2021-03-17 RX ADMIN — INFLUENZA VIRUS VACCINE 0.5 ML: 15; 15; 15; 15 SUSPENSION INTRAMUSCULAR at 11:46

## 2021-03-17 NOTE — GROUP NOTE
[facility] GROUP DOCUMENTATION INDIVIDUAL Group Therapy Note Date: 3/16/2021 Group Start Time: 80 Group End Time: 2015 Group Topic: Nursing [facility] 1 ADULT CHEM DEP Syed Gramajo RN 
 
[facility] GROUP DOCUMENTATION GROUP Group Therapy Note Attendees: 9 Attendance: Attended Patient's Goal:  Understanding medication as prescribed and daily reflection. Interventions/techniques: Informed, Validated, Provide feedback and Reinforced Follows Directions: Followed directions Interactions: Interacted appropriately Mental Status: Calm Behavior/appearance: Cooperative Goals Achieved: Able to reflect/comment on own behavior and Able to receive feedback Additional Notes:  Pt has been isolated to room. Patient has been compliant with mediation. PRN medication offered for sleep and pt refused and stated, \"I don't want that Trazodone. It made me nauseous all day today. \"  Pt reported that she was doing better, but just nauseous. Will continue to monitor and support as needed.   
 
Jennifer Chris RN

## 2021-03-17 NOTE — BH NOTES
Discharge Note                Upon discharge, patient presented as Stable and denies Anxious and Depressed. Patient received discharge instructions and verbalized understanding. Writer also offered patient the flu immunization and education, to which the patient said Yes and so the flu immunization was administered. All patient belongings have been returned:  Dental Appliance:    Vision: Visual Aid: None  Hearing Aid:    Jewelry: Jewelry: Earrings, Necklace  Clothing: Clothing: Footwear, Pants, Shirt, Undergarments  Other Valuables: Other Valuables: Purse, Cell Phone  Valuables sent to safe:    Patient armband removed and shredded, and was escorted out of facility, ambulatory, with staff.

## 2021-03-17 NOTE — BSMART NOTE
Pt.'s case was discussed this a.m  Pt. Will be [place] today. SW contacted [facility] to obtain the pt.s' aftercare appointment x 3. Pt. Has a medication appointment with Dr. Waleska Sanford on  3/31/21 @ 2:30   and will be provided a therapy appointment with Carmen Reyes RN,LPC at a later time. DC PLAN/SW Contact:  Covering SW met with pt to discuss dc plan. Pt. Expressed she was feeling better. Pt denied ideations and hallucinations. Sw had pt to reflect on safety plan. Pt informed Sw she plan to return to her home at [place]. Pt. Reports her daughter will be staying at her home for a couple of weeks to provide support. Covering SW provided pt with encouraging words and feedback. Pt. Will be picked up by family member at [place].  
 
 
Eliane Gallardo MA, LMHP-R

## 2021-03-17 NOTE — SUICIDE SAFETY PLAN
SAFETY PLAN    A suicide Safety Plan is a document that supports someone when they are having thoughts of suicide. Warning Signs that indicate a suicidal crisis may be developing: What (situations, thoughts, feelings, body sensations, behaviors, etc.) do you experience that lets you know you are beginning to think about suicide? 1. Things in my head  2. Anxiety    Internal Coping Strategies:  What things can I do (relaxation techniques, hobbies, physical activities, etc.) to take my mind off my problems without contacting another person? 1. Arts and Crafts  2. Sewing  3. Baking    People and social settings that provide distraction: Who can I call or where can I go to distract me? 1. Name: Varsha Massey  Phone: [phone]    People whom I can ask for help: Who can I call when I need help - for example, friends, family, clergy, someone else? 1. Name: Krysta Bojorquez                Phone: [phone]  2. Name: Vasquez Shah  Phone: [phone]  3. Name: Frandy Slater  Phone: [phone]    Professionals or [address] [place] [address] I can contact during a crisis: Who can I call for help - for example, my doctor, my psychiatrist, my psychologist, a mental health provider, a suicide hotline? 1. Clinician Name: Dr. Kalin Carey   Phone: [phone]    3. Suicide Prevention Lifeline: 5-069-686-TALK (2394)    4. [address] [place] [address] Emergency Services -  for example, [facility] suicide hotline, [facility] Hotline: [place]    Making the environment safe: How can I make my environment (house/apartment/living space) safer? For example, can I remove guns, medications, and other items? 1. Medicine  2.  Guns

## 2021-03-18 ENCOUNTER — TELEPHONE (OUTPATIENT)
Dept: ADDICTION MEDICINE | Age: 61
End: 2021-03-18

## 2021-03-18 NOTE — TELEPHONE ENCOUNTER
This writer spoke with patient for follow-up call at [phone]. The patient states she is doing good. The patient does confirm that she received prescriptions upon discharge and that her daughter is picking up the prescriptions for her. Patient also confirms the plan to follow-up with [facility] (HAILEY VASQUEZ). Patient also wishes to thank her nurse Mariah Mendez as she was great.

## 2021-08-17 ENCOUNTER — APPOINTMENT (OUTPATIENT)
Dept: GENERAL RADIOLOGY | Age: 61
End: 2021-08-17
Attending: EMERGENCY MEDICINE
Payer: MEDICAID

## 2021-08-17 ENCOUNTER — HOSPITAL ENCOUNTER (EMERGENCY)
Age: 61
Discharge: HOME | End: 2021-08-17
Attending: EMERGENCY MEDICINE
Payer: MEDICAID

## 2021-08-17 VITALS
TEMPERATURE: 99.1 F | RESPIRATION RATE: 13 BRPM | WEIGHT: 185 LBS | DIASTOLIC BLOOD PRESSURE: 56 MMHG | OXYGEN SATURATION: 97 % | HEART RATE: 79 BPM | SYSTOLIC BLOOD PRESSURE: 105 MMHG | BODY MASS INDEX: 29.73 KG/M2 | HEIGHT: 66 IN

## 2021-08-17 DIAGNOSIS — J06.9 UPPER RESPIRATORY TRACT INFECTION, UNSPECIFIED TYPE: Primary | ICD-10-CM

## 2021-08-17 DIAGNOSIS — J18.9 COMMUNITY ACQUIRED PNEUMONIA, UNSPECIFIED LATERALITY: ICD-10-CM

## 2021-08-17 DIAGNOSIS — Z20.822 SUSPECTED COVID-19 VIRUS INFECTION: ICD-10-CM

## 2021-08-17 LAB — SARS-COV-2, COV2: NORMAL

## 2021-08-17 PROCEDURE — U0003 INFECTIOUS AGENT DETECTION BY NUCLEIC ACID (DNA OR RNA); SEVERE ACUTE RESPIRATORY SYNDROME CORONAVIRUS 2 (SARS-COV-2) (CORONAVIRUS DISEASE [COVID-19]), AMPLIFIED PROBE TECHNIQUE, MAKING USE OF HIGH THROUGHPUT TECHNOLOGIES AS DESCRIBED BY CMS-2020-01-R: HCPCS

## 2021-08-17 PROCEDURE — 71045 X-RAY EXAM CHEST 1 VIEW: CPT

## 2021-08-17 PROCEDURE — 74011250637 HC RX REV CODE- 250/637: Performed by: EMERGENCY MEDICINE

## 2021-08-17 PROCEDURE — 99283 EMERGENCY DEPT VISIT LOW MDM: CPT

## 2021-08-17 RX ORDER — DOXYCYCLINE HYCLATE 100 MG
100 TABLET ORAL 2 TIMES DAILY
Qty: 14 TABLET | Refills: 0 | Status: SHIPPED | OUTPATIENT
Start: 2021-08-17 | End: 2021-08-24

## 2021-08-17 RX ORDER — ONDANSETRON 4 MG/1
4 TABLET, ORALLY DISINTEGRATING ORAL
Qty: 12 TABLET | Refills: 0 | Status: SHIPPED | OUTPATIENT
Start: 2021-08-17

## 2021-08-17 RX ORDER — ONDANSETRON 4 MG/1
4 TABLET, ORALLY DISINTEGRATING ORAL
Status: COMPLETED | OUTPATIENT
Start: 2021-08-17 | End: 2021-08-17

## 2021-08-17 RX ADMIN — ONDANSETRON 4 MG: 4 TABLET, ORALLY DISINTEGRATING ORAL at 11:01

## 2021-08-17 NOTE — ED PROVIDER NOTES
49-year-old female history of diabetes, hyperlipidemia, hypertension, arthritis presents for evaluation of URI symptoms. Onset 4 days ago with nasal congestion, scratchy throat, hacking cough, loss of appetite, loss of sense of taste. Patient has not been immunized against COVID-19. No fever reported. Patient has been nauseated without vomiting. No diarrhea. Past Medical History:   Diagnosis Date    Anxiety     Arthritis     Depression     Diabetes ([address]     Elevated cholesterol     no meds     GERD (gastroesophageal reflux disease)     Hypertension        Past Surgical History:   Procedure Laterality Date    HX  SECTION      x 4    HX KNEE ARTHROSCOPY Right     HX KNEE REPLACEMENT Right 2018    HX TUBAL LIGATION           History reviewed. No pertinent family history. Social History     Socioeconomic History    Marital status: LEGALLY      Spouse name: Not on file    Number of children: Not on file    Years of education: Not on file    Highest education level: Not on file   Occupational History    Not on file   Tobacco Use    Smoking status: Never Smoker    Smokeless tobacco: Never Used   Substance and Sexual Activity    Alcohol use: No    Drug use: No    Sexual activity: Not on file   Other Topics Concern    Not on file   Social History Narrative    Not on file     Social Determinants of Health     Financial Resource Strain:     Difficulty of Paying Living Expenses:    Food Insecurity:     Worried About Running Out of Food in the Last Year:     [address] in the Last Year:    Transportation Needs:     Lack of Transportation (Medical):      Lack of Transportation (Non-Medical):    Physical Activity:     Days of Exercise per Week:     Minutes of Exercise per Session:    Stress:     Feeling of Stress :    Social Connections:     Frequency of Communication with Friends and Family:     Frequency of Social Gatherings with Friends and Family:     Attends Religion Services:     Active Member of Clubs or Organizations:     Attends Club or Organization Meetings:     Marital Status:    Intimate Partner Violence:     Fear of Current or Ex-Partner:     Emotionally Abused:     Physically Abused:     Sexually Abused: ALLERGIES: Patient has no known allergies. Review of Systems   Constitutional: Positive for fatigue. HENT: Positive for congestion, rhinorrhea and sinus pressure. Respiratory: Positive for cough. Gastrointestinal: Positive for nausea. All other systems reviewed and are negative. Vitals:    08/17/21 0901 08/17/21 0945   BP: (!) 133/58 (!) 105/56   Pulse: 87 79   Resp: 15 13   Temp: 99.1 °F (37.3 °C)    SpO2: 97% 97%   Weight: 83.9 kg (185 lb)    Height: 5' 6\" (1.676 m)             Physical Exam  Vitals and nursing note reviewed. Constitutional:       General: She is not in acute distress. Appearance: She is well-developed. HENT:      Head: Normocephalic and atraumatic. Eyes:      General: No scleral icterus. Cardiovascular:      Rate and Rhythm: Normal rate and regular rhythm. Pulmonary:      Effort: Pulmonary effort is normal.      Breath sounds: Normal breath sounds. No wheezing, rhonchi or rales. Abdominal:      Tenderness: There is no abdominal tenderness. Skin:     General: Skin is warm and dry. Neurological:      Mental Status: She is alert and oriented to person, place, and time. XR CHEST PORT   Final Result      New bilateral opacities, suggestive of multifocal pneumonia. MDM  Number of Diagnoses or Management Options  Community acquired pneumonia, unspecified laterality  Suspected COVID-19 virus infection  Upper respiratory tract infection, unspecified type  Diagnosis management comments: Impression: URI symptoms consider routine cough cold organisms like parainfluenza virus, RSV, adenovirus. Symptoms also consistent with Covid. Normal oxygenation and clear lungs.   Covid swab sent for testing. Precautions reviewed. Chest x-ray reviewed. Bilateral interstitial pattern noted consistent with Covid pneumonia. Will cover with doxycycline for atypicals. Counseling provided. Patient does not meet sepsis criteria, is afebrile, has normal respiratory rate, and normal O2 saturation. Peers appropriate candidate for outpatient treatment at this time. Likely COVID-19 infection. Procedures    Diagnosis:   1. Upper respiratory tract infection, unspecified type    2. Community acquired pneumonia, unspecified laterality    3. Suspected COVID-19 virus infection          Disposition: home    Follow-up Information     Follow up With Specialties Details Why Contact Info    Marii Montez MD Internal Medicine Schedule an appointment as soon as possible for a visit in 3 days for recheck of ongoing symptoms [address] [place] [address]      [address] [facility] EMERGENCY DEPT Emergency Medicine  As needed, If symptoms worsen [facility]  [phone]          Patient's Medications   Start Taking    DOXYCYCLINE (VIBRA-TABS) 100 MG TABLET    Take 1 Tablet by mouth two (2) times a day for 7 days. ONDANSETRON (ZOFRAN ODT) 4 MG DISINTEGRATING TABLET    1 Tablet by SubLINGual route every six (6) hours as needed for Nausea. Continue Taking    ASPIRIN DELAYED-RELEASE 325 MG TABLET    Take 1 Tab by mouth two (2) times a day. DULOXETINE (CYMBALTA) 30 MG CAPSULE    Take 1 Cap by mouth two (2) times a day. Indications: major depressive disorder, pain    FERROUS SULFATE 325 MG (65 MG IRON) TABLET    Take 1 Tab by mouth two (2) times daily (with meals). HYDROCHLOROTHIAZIDE (HYDRODIURIL) 25 MG TABLET    Take 1 Tab by mouth daily. Indications: high blood pressure    MELOXICAM (MOBIC) 15 MG TABLET    Take 1 Tab by mouth daily. METFORMIN (GLUCOPHAGE) 1,000 MG TABLET    Take 1 Tab by mouth two (2) times daily (with meals).  Indications: type 2 diabetes mellitus    PANTOPRAZOLE (PROTONIX) 40 MG TABLET    Take 1 Tab by mouth Daily (before breakfast). Indications: stomach ulcer from aspirin/ibuprofen-like drugs prevention    TRAZODONE (DESYREL) 50 MG TABLET    Take 1 Tab by mouth nightly as needed for Sleep.  Indications: insomnia associated with depression    VITAMIN D2 50,000 UNIT CAPSULE    take 1 capsule by mouth every week   These Medications have changed    No medications on file   Stop Taking    No medications on file

## 2021-08-17 NOTE — DISCHARGE INSTRUCTIONS
Symptoms are highly suggestive of COVID-19 infection. Chest x-ray demonstrates bilateral infiltrates typical for this disease. Cannot completely rule out possible bacterial infection at this time. Doxycycline prescribed to treat potential bacterial pathogens pending COVID-19 swab results. Zofran prescribed for nausea. Plenty of liquids. Follow-up with your primary care physician in 3 to 5 days. Signs of severe community-acquired pneumonia or COVID-19 infection include shortness of breath at rest, persistent high fever, or worsening cough productive of rust colored sputum or mary blood. Tylenol for fever and body aches. Isolation precautions at home pending results of COVID-19 test. You may access these results via [address] on your home computer.

## 2021-08-17 NOTE — ED NOTES
Pt arrived to ER from home s/p possible exposure to Covid 19. Pt states sx started approx 4 days ago with nausea and no appetite. Pt states 3 days ago she started having a mild cough. Pt has since developed chills/sweats. Pt denies any SOB or CP. Pt is diabetic, last checked BS 2 days ago and reports \"it was normal\".     Pt is not vaccinated for Covid 19

## 2021-08-18 ENCOUNTER — PATIENT OUTREACH (OUTPATIENT)
Dept: CASE MANAGEMENT | Age: 61
End: 2021-08-18

## 2021-08-18 NOTE — PROGRESS NOTES
.Date/Time:  8/18/2021 11:45 AM   Call within 2 business days of discharge: Yes   Attempted to reach patient    by telephone. Left HIPPA compliant message requesting a return call. Will attempt to reach patient again. Patient seen in [facility] ED 8/17/ X-ray bilateral pneumonia COVID tested pending results.

## 2021-08-19 ENCOUNTER — PATIENT OUTREACH (OUTPATIENT)
Dept: CASE MANAGEMENT | Age: 61
End: 2021-08-19

## 2021-08-19 LAB — SARS-COV-2, COV2NT: DETECTED

## 2021-08-20 ENCOUNTER — PATIENT OUTREACH (OUTPATIENT)
Dept: CASE MANAGEMENT | Age: 61
End: 2021-08-20

## 2021-08-20 NOTE — PROGRESS NOTES
ACM 3rd attempt at out reach to make sure patient is awear of POS-COVID test. No return call. Unable to contact episode closed.

## 2021-09-02 ENCOUNTER — HOSPITAL ENCOUNTER (EMERGENCY)
Dept: ULTRASOUND IMAGING | Age: 61
Discharge: HOME | End: 2021-09-02
Attending: NURSE PRACTITIONER
Payer: MEDICAID

## 2021-09-02 ENCOUNTER — APPOINTMENT (OUTPATIENT)
Dept: GENERAL RADIOLOGY | Age: 61
End: 2021-09-02
Attending: PHYSICIAN ASSISTANT
Payer: MEDICAID

## 2021-09-02 ENCOUNTER — HOSPITAL ENCOUNTER (EMERGENCY)
Age: 61
Discharge: HOME | End: 2021-09-02
Attending: STUDENT IN AN ORGANIZED HEALTH CARE EDUCATION/TRAINING PROGRAM
Payer: MEDICAID

## 2021-09-02 ENCOUNTER — APPOINTMENT (OUTPATIENT)
Dept: CT IMAGING | Age: 61
End: 2021-09-02
Attending: NURSE PRACTITIONER
Payer: MEDICAID

## 2021-09-02 VITALS
TEMPERATURE: 98 F | HEART RATE: 64 BPM | RESPIRATION RATE: 18 BRPM | OXYGEN SATURATION: 95 % | SYSTOLIC BLOOD PRESSURE: 146 MMHG | DIASTOLIC BLOOD PRESSURE: 83 MMHG

## 2021-09-02 DIAGNOSIS — R11.2 NON-INTRACTABLE VOMITING WITH NAUSEA, UNSPECIFIED VOMITING TYPE: ICD-10-CM

## 2021-09-02 DIAGNOSIS — R55 SYNCOPE AND COLLAPSE: Primary | ICD-10-CM

## 2021-09-02 DIAGNOSIS — R74.8 ELEVATED LIVER ENZYMES: ICD-10-CM

## 2021-09-02 DIAGNOSIS — E87.6 HYPOKALEMIA: ICD-10-CM

## 2021-09-02 LAB
ALBUMIN SERPL-MCNC: 3.4 G/DL (ref 3.4–5)
ALBUMIN/GLOB SERPL: 0.7 {RATIO} (ref 0.8–1.7)
ALP SERPL-CCNC: 78 U/L (ref 45–117)
ALT SERPL-CCNC: 240 U/L (ref 13–56)
ANION GAP SERPL CALC-SCNC: 10 MMOL/L (ref 3–18)
APPEARANCE UR: ABNORMAL
AST SERPL-CCNC: 95 U/L (ref 10–38)
BACTERIA URNS QL MICRO: NEGATIVE /HPF
BASOPHILS # BLD: 0.1 K/UL (ref 0–0.1)
BASOPHILS NFR BLD: 1 % (ref 0–2)
BILIRUB SERPL-MCNC: 2.5 MG/DL (ref 0.2–1)
BILIRUB UR QL: ABNORMAL
BUN SERPL-MCNC: 9 MG/DL (ref 7–18)
BUN/CREAT SERPL: 11 (ref 12–20)
CALCIUM SERPL-MCNC: 9.4 MG/DL (ref 8.5–10.1)
CHLORIDE SERPL-SCNC: 102 MMOL/L (ref 100–111)
CO2 SERPL-SCNC: 28 MMOL/L (ref 21–32)
COLOR UR: ABNORMAL
CREAT SERPL-MCNC: 0.84 MG/DL (ref 0.6–1.3)
DIFFERENTIAL METHOD BLD: ABNORMAL
EOSINOPHIL # BLD: 0.1 K/UL (ref 0–0.4)
EOSINOPHIL NFR BLD: 1 % (ref 0–5)
EPITH CASTS URNS QL MICRO: NORMAL /LPF (ref 0–5)
ERYTHROCYTE [DISTWIDTH] IN BLOOD BY AUTOMATED COUNT: 14.1 % (ref 11.6–14.5)
GLOBULIN SER CALC-MCNC: 5 G/DL (ref 2–4)
GLUCOSE BLD STRIP.AUTO-MCNC: 95 MG/DL (ref 70–110)
GLUCOSE SERPL-MCNC: 112 MG/DL (ref 74–99)
GLUCOSE UR STRIP.AUTO-MCNC: NEGATIVE MG/DL
HCT VFR BLD AUTO: 46.8 % (ref 35–45)
HGB BLD-MCNC: 15.1 G/DL (ref 12–16)
HGB UR QL STRIP: NEGATIVE
KETONES UR QL STRIP.AUTO: 40 MG/DL
LEUKOCYTE ESTERASE UR QL STRIP.AUTO: NEGATIVE
LIPASE SERPL-CCNC: 193 U/L (ref 73–393)
LYMPHOCYTES # BLD: 1.4 K/UL (ref 0.9–3.6)
LYMPHOCYTES NFR BLD: 16 % (ref 21–52)
MAGNESIUM SERPL-MCNC: 2.4 MG/DL (ref 1.6–2.6)
MCH RBC QN AUTO: 27.1 PG (ref 24–34)
MCHC RBC AUTO-ENTMCNC: 32.3 G/DL (ref 31–37)
MCV RBC AUTO: 83.9 FL (ref 78–100)
MONOCYTES # BLD: 0.9 K/UL (ref 0.05–1.2)
MONOCYTES NFR BLD: 10 % (ref 3–10)
NEUTS SEG # BLD: 6.4 K/UL (ref 1.8–8)
NEUTS SEG NFR BLD: 72 % (ref 40–73)
NITRITE UR QL STRIP.AUTO: NEGATIVE
PH UR STRIP: 6.5 [PH] (ref 5–8)
PLATELET # BLD AUTO: 545 K/UL (ref 135–420)
PMV BLD AUTO: 9.4 FL (ref 9.2–11.8)
POTASSIUM SERPL-SCNC: 3.2 MMOL/L (ref 3.5–5.5)
PROT SERPL-MCNC: 8.4 G/DL (ref 6.4–8.2)
PROT UR STRIP-MCNC: ABNORMAL MG/DL
RBC # BLD AUTO: 5.58 M/UL (ref 4.2–5.3)
RBC #/AREA URNS HPF: NEGATIVE /HPF (ref 0–5)
SODIUM SERPL-SCNC: 140 MMOL/L (ref 136–145)
SP GR UR REFRACTOMETRY: >1.03 (ref 1–1.03)
TROPONIN I SERPL-MCNC: <0.02 NG/ML (ref 0–0.04)
TROPONIN I SERPL-MCNC: <0.02 NG/ML (ref 0–0.04)
UROBILINOGEN UR QL STRIP.AUTO: 2 EU/DL (ref 0.2–1)
WBC # BLD AUTO: 8.9 K/UL (ref 4.6–13.2)
WBC URNS QL MICRO: NORMAL /HPF (ref 0–5)

## 2021-09-02 PROCEDURE — 76705 ECHO EXAM OF ABDOMEN: CPT

## 2021-09-02 PROCEDURE — 84484 ASSAY OF TROPONIN QUANT: CPT

## 2021-09-02 PROCEDURE — 71046 X-RAY EXAM CHEST 2 VIEWS: CPT

## 2021-09-02 PROCEDURE — 80053 COMPREHEN METABOLIC PANEL: CPT

## 2021-09-02 PROCEDURE — 74011000636 HC RX REV CODE- 636: Performed by: STUDENT IN AN ORGANIZED HEALTH CARE EDUCATION/TRAINING PROGRAM

## 2021-09-02 PROCEDURE — 74011250637 HC RX REV CODE- 250/637: Performed by: NURSE PRACTITIONER

## 2021-09-02 PROCEDURE — 74011250636 HC RX REV CODE- 250/636: Performed by: PHYSICIAN ASSISTANT

## 2021-09-02 PROCEDURE — 85025 COMPLETE CBC W/AUTO DIFF WBC: CPT

## 2021-09-02 PROCEDURE — 82962 GLUCOSE BLOOD TEST: CPT

## 2021-09-02 PROCEDURE — 96361 HYDRATE IV INFUSION ADD-ON: CPT

## 2021-09-02 PROCEDURE — 96374 THER/PROPH/DIAG INJ IV PUSH: CPT

## 2021-09-02 PROCEDURE — 83690 ASSAY OF LIPASE: CPT

## 2021-09-02 PROCEDURE — 81001 URINALYSIS AUTO W/SCOPE: CPT

## 2021-09-02 PROCEDURE — 93005 ELECTROCARDIOGRAM TRACING: CPT

## 2021-09-02 PROCEDURE — 96372 THER/PROPH/DIAG INJ SC/IM: CPT

## 2021-09-02 PROCEDURE — 83735 ASSAY OF MAGNESIUM: CPT

## 2021-09-02 PROCEDURE — 74177 CT ABD & PELVIS W/CONTRAST: CPT

## 2021-09-02 PROCEDURE — 99284 EMERGENCY DEPT VISIT MOD MDM: CPT

## 2021-09-02 PROCEDURE — 71275 CT ANGIOGRAPHY CHEST: CPT

## 2021-09-02 RX ORDER — PROMETHAZINE HYDROCHLORIDE 25 MG/1
25 SUPPOSITORY RECTAL
Qty: 12 SUPPOSITORY | Refills: 0 | Status: SHIPPED | OUTPATIENT
Start: 2021-09-02 | End: 2021-09-09

## 2021-09-02 RX ORDER — POTASSIUM CHLORIDE 1500 MG/1
20 TABLET, FILM COATED, EXTENDED RELEASE ORAL 2 TIMES DAILY
Qty: 8 TABLET | Refills: 0 | Status: SHIPPED | OUTPATIENT
Start: 2021-09-02

## 2021-09-02 RX ORDER — PROMETHAZINE HYDROCHLORIDE 25 MG/ML
25 INJECTION, SOLUTION INTRAMUSCULAR; INTRAVENOUS
Status: COMPLETED | OUTPATIENT
Start: 2021-09-02 | End: 2021-09-02

## 2021-09-02 RX ORDER — ONDANSETRON 2 MG/ML
4 INJECTION INTRAMUSCULAR; INTRAVENOUS
Status: COMPLETED | OUTPATIENT
Start: 2021-09-02 | End: 2021-09-02

## 2021-09-02 RX ORDER — POTASSIUM CHLORIDE 20 MEQ/1
40 TABLET, EXTENDED RELEASE ORAL
Status: COMPLETED | OUTPATIENT
Start: 2021-09-02 | End: 2021-09-02

## 2021-09-02 RX ADMIN — SODIUM CHLORIDE 1000 ML: 900 INJECTION, SOLUTION INTRAVENOUS at 18:59

## 2021-09-02 RX ADMIN — SODIUM CHLORIDE 1000 ML: 900 INJECTION, SOLUTION INTRAVENOUS at 13:35

## 2021-09-02 RX ADMIN — ONDANSETRON 4 MG: 2 INJECTION INTRAMUSCULAR; INTRAVENOUS at 13:34

## 2021-09-02 RX ADMIN — PROMETHAZINE HYDROCHLORIDE 25 MG: 25 INJECTION INTRAMUSCULAR; INTRAVENOUS at 19:16

## 2021-09-02 RX ADMIN — POTASSIUM CHLORIDE 40 MEQ: 1500 TABLET, EXTENDED RELEASE ORAL at 19:01

## 2021-09-02 RX ADMIN — IOPAMIDOL 77 ML: 755 INJECTION, SOLUTION INTRAVENOUS at 14:37

## 2021-09-02 NOTE — ED NOTES
Patient in results waiting, IV infusion completed, Vital signs as documented. Patient still complaining of nausea, states she has been unable to eat for 10 days. Provider also in to re-evaluate at this time and to discuss test results.

## 2021-09-02 NOTE — ED NOTES
6:00 PM Assumed care of the pt at this time. Discussed with TONY Doe concerning patient Ivey Spurling, standard discussion of reason for visit, HPI, ROS, PE, and current results available. Recommendation for obtaining pending gallbladder US and bedside re-evaluation to dispo the pt. Mary Anderson PA-C     6:54 PM ultrasound shows hemangioma in the left lobe of the liver, the central meningioma seen on CT is not well visualized on this exam, cholelithiasis without evidence of cholecystitis. I have seen and reevaluated the patient at bedside. Discussed all these results with the patient bedside. Patient states she still feels very nauseous and her nausea is significantly increased every time she tries to eat or drink anything. She was given Zofran and 1 bag of IV fluids. We will plan to order a second bag of fluids as well as IM Phenergan. Mary Leach PA-C     8:36 PM pt sx are somewhat improving. She still has about 700 mL IV fluids left. Will plan to d/c with phenergan suppositories and potassium chloride. Close PCP follow-up. Pt agrees with this plan. Mary Leach PA-C     Disposition: d/c    Dictation disclaimer:  Please note that this dictation was completed with [employer], the computer voice recognition software. Quite often unanticipated grammatical, syntax, homophones, and other interpretive errors are inadvertently transcribed by the computer software. Please disregard these errors. Please excuse any errors that have escaped final proofreading.

## 2021-09-02 NOTE — DISCHARGE INSTRUCTIONS
[facility] Activation    Thank you for requesting access to [facility]. Please follow the instructions below to securely access and download your online medical record. [facility] allows you to send messages to your doctor, view your test results, renew your prescriptions, schedule appointments, and more. How Do I Sign Up? In your internet browser, go to [email or web address]  Click on the First Time User? Click Here link in the Sign In box. You will be redirect to the New Member Sign Up page. Enter your [facility] Access Code exactly as it appears below. You will not need to use this code after youve completed the sign-up process. If you do not sign up before the expiration date, you must request a new code. [facility] Access Code: [unfilled] (This is the date your [facility] access code will )    Enter the last four digits of your Social Security Number (xxxx) and Date of Birth (mm/dd/yyyy) as indicated and click Submit. You will be taken to the next sign-up page. Create a [facility] ID. This will be your [facility] login ID and cannot be changed, so think of one that is secure and easy to remember. Create a [facility] password. You can change your password at any time. Enter your Password Reset Question and Answer. This can be used at a later time if you forget your password. Enter your e-mail address. You will receive e-mail notification when new information is available in [address] Click Sign Up. You can now view and download portions of your medical record. Click the [place] link to download a portable copy of your medical information. Additional Information    If you have questions, please visit the Frequently Asked Questions section of the [employer] website at [email or web address] Remember, [facility] is NOT to be used for urgent needs. For medical emergencies, dial 911.

## 2021-09-02 NOTE — ED NOTES
Syncope, nausea, weakness. I performed a brief evaluation, including history and physical, of the patient here in triage and I have determined that pt will need further treatment and evaluation from the main side ER physician. I have placed initial orders to help in expediting patients care.      September 02, 2021 at 11:25 AM - MARY Garcia        Visit Vitals  Pulse 94   Temp 98 °F (36.7 °C)   Resp 16   SpO2 96%

## 2021-09-02 NOTE — ED PROVIDER NOTES
EMERGENCY DEPARTMENT HISTORY AND PHYSICAL EXAM    12:11 PM      Date: 9/2/2021  Patient Name: Julissa Green    History of Presenting Illness     Chief Complaint   Patient presents with    Syncope       History Provided By: Patient    Additional History (Context): Julissa Green is a 64 y.o. female with past medical history significant for anxiety, depression, diabetes, hypertension, GERD, and high cholesterol who presents with complaints of syncope that happened today prior to arrival and shortness of breath that started 2 weeks ago when she was diagnosed with COVID-19 on August 17 2021. She states she initially had a cough but that has resolved completely. She denies any chest pain, diaphoresis, she states for the last week she has had no appetite and had significant nausea after ingestion of any solids or liquids. She has had dry heaving but no vomiting. She states this morning when she got out of bed she was going to the bathroom urgently as she had a need to have a bowel movement. She states she felt very weak and lightheaded and lowered herself to the ground and briefly lost consciousness, she is unsure but thinks she was out for approximately 1 minute. States her granddaughter found her at which point she had an episode of fecal incontinence with diarrhea. No Covid vaccination previously. When she is diagnosed with Covid on August 17 she was discharged with a prescription for doxycycline which she has been taking but due to nausea still has some pills left. She denies fever, chills, urinary complaints, headache, visual changes, dizziness, or paresthesia. Patient denies smoking cigarettes.     PCP: Sade Wilkes MD    Current Facility-Administered Medications   Medication Dose Route Frequency Provider Last Rate Last Admin    potassium chloride (K-DUR, KLOR-CON) SR tablet 40 mEq  40 mEq Oral NOW Adams Gottron, FNP         Current Outpatient Medications   Medication Sig Dispense Refill    ondansetron (Zofran ODT) 4 mg disintegrating tablet 1 Tablet by SubLINGual route every six (6) hours as needed for Nausea. 12 Tablet 0    hydroCHLOROthiazide (HYDRODIURIL) 25 mg tablet Take 1 Tab by mouth daily. Indications: high blood pressure 30 Tab 0    metFORMIN (GLUCOPHAGE) 1,000 mg tablet Take 1 Tab by mouth two (2) times daily (with meals). Indications: type 2 diabetes mellitus 60 Tab 0    traZODone (DESYREL) 50 mg tablet Take 1 Tab by mouth nightly as needed for Sleep. Indications: insomnia associated with depression 15 Tab 1    DULoxetine (CYMBALTA) 30 mg capsule Take 1 Cap by mouth two (2) times a day. Indications: major depressive disorder, pain 60 Cap 0    pantoprazole (PROTONIX) 40 mg tablet Take 1 Tab by mouth Daily (before breakfast). Indications: stomach ulcer from aspirin/ibuprofen-like drugs prevention 30 Tab 0    meloxicam (MOBIC) 15 mg tablet Take 1 Tab by mouth daily. 30 Tab 2    aspirin delayed-release 325 mg tablet Take 1 Tab by mouth two (2) times a day. 60 Tab 1    ferrous sulfate 325 mg (65 mg iron) tablet Take 1 Tab by mouth two (2) times daily (with meals). 60 Tab 1    VITAMIN D2 50,000 unit capsule take 1 capsule by mouth every week  0       Past History     Past Medical History:  Past Medical History:   Diagnosis Date    Anxiety     Arthritis     Depression     Diabetes ([address]     Elevated cholesterol     no meds     GERD (gastroesophageal reflux disease)     Hypertension        Past Surgical History:  Past Surgical History:   Procedure Laterality Date    HX  SECTION      x 4    HX KNEE ARTHROSCOPY Right     HX KNEE REPLACEMENT Right 2018    HX TUBAL LIGATION         Family History:  No family history on file.     Social History:  Social History     Tobacco Use    Smoking status: Never Smoker    Smokeless tobacco: Never Used   Substance Use Topics    Alcohol use: No    Drug use: No       Allergies:  No Known Allergies      Review of Systems       Review of Systems Constitutional: Positive for appetite change and fatigue. Negative for chills and fever. HENT: Negative. Negative for congestion, ear pain and rhinorrhea. Eyes: Negative. Negative for pain and redness. Respiratory: Positive for shortness of breath. Negative for cough. Cardiovascular: Negative. Negative for chest pain, palpitations and leg swelling. Gastrointestinal: Positive for diarrhea, nausea and vomiting. Negative for abdominal pain and constipation. Genitourinary: Negative. Negative for dysuria, frequency, hematuria and urgency. Musculoskeletal: Negative. Negative for back pain, gait problem, joint swelling and neck pain. Skin: Negative. Negative for rash and wound. Neurological: Positive for weakness. Negative for dizziness, seizures, speech difficulty, light-headedness and headaches. Hematological: Negative for adenopathy. Does not bruise/bleed easily. All other systems reviewed and are negative. Physical Exam     Visit Vitals  BP (!) 146/83   Pulse 64   Temp 98 °F (36.7 °C)   Resp 18   SpO2 95%         Physical Exam  Vitals and nursing note reviewed. Constitutional:       General: She is not in acute distress. Appearance: Normal appearance. She is not ill-appearing, toxic-appearing or diaphoretic. HENT:      Head: Normocephalic and atraumatic. Nose: Nose normal.   Eyes:      General: Lids are normal. Vision grossly intact. Conjunctiva/sclera: Conjunctivae normal.   Cardiovascular:      Rate and Rhythm: Normal rate and regular rhythm. Pulses: Normal pulses. Heart sounds: Normal heart sounds. No murmur heard. Pulmonary:      Effort: Pulmonary effort is normal. No respiratory distress. Breath sounds: Normal breath sounds. No stridor. No wheezing, rhonchi or rales. Chest:      Chest wall: No tenderness. Musculoskeletal:         General: Normal range of motion.       Cervical back: Full passive range of motion without pain, normal range of motion and neck supple. Skin:     General: Skin is warm and dry. Capillary Refill: Capillary refill takes less than 2 seconds. Neurological:      General: No focal deficit present. Mental Status: She is alert and oriented to person, place, and time. Psychiatric:         Mood and Affect: Mood normal.         Behavior: Behavior normal. Behavior is cooperative. Diagnostic Study Results     Labs -  Recent Results (from the past 12 hour(s))   EKG, 12 LEAD, INITIAL    Collection Time: 09/02/21 10:28 AM   Result Value Ref Range    Ventricular Rate 79 BPM    Atrial Rate 79 BPM    P-R Interval 134 ms    QRS Duration 148 ms    Q-T Interval 458 ms    QTC Calculation (Bezet) 525 ms    Calculated P Axis 46 degrees    Calculated R Axis -27 degrees    Calculated T Axis -94 degrees    Diagnosis       Normal sinus rhythm  Right bundle branch block  T wave abnormality, consider inferolateral ischemia  Abnormal ECG  When compared with ECG of 13-MAR-2021 13:04,  Right bundle branch block has replaced Incomplete right bundle branch block     CBC WITH AUTOMATED DIFF    Collection Time: 09/02/21 11:29 AM   Result Value Ref Range    WBC 8.9 4.6 - 13.2 K/uL    RBC 5.58 (H) 4.20 - 5.30 M/uL    HGB 15.1 12.0 - 16.0 g/dL    HCT 46.8 (H) 35.0 - 45.0 %    MCV 83.9 78.0 - 100.0 FL    MCH 27.1 24.0 - 34.0 PG    MCHC 32.3 31.0 - 37.0 g/dL    RDW 14.1 11.6 - 14.5 %    PLATELET 636 (H) 149 - 420 K/uL    MPV 9.4 9.2 - 11.8 FL    NEUTROPHILS 72 40 - 73 %    LYMPHOCYTES 16 (L) 21 - 52 %    MONOCYTES 10 3 - 10 %    EOSINOPHILS 1 0 - 5 %    BASOPHILS 1 0 - 2 %    ABS. NEUTROPHILS 6.4 1.8 - 8.0 K/UL    ABS. LYMPHOCYTES 1.4 0.9 - 3.6 K/UL    ABS. MONOCYTES 0.9 0.05 - 1.2 K/UL    ABS. EOSINOPHILS 0.1 0.0 - 0.4 K/UL    ABS.  BASOPHILS 0.1 0.0 - 0.1 K/UL    DF AUTOMATED     METABOLIC PANEL, COMPREHENSIVE    Collection Time: 09/02/21 11:29 AM   Result Value Ref Range    Sodium 140 136 - 145 mmol/L    Potassium 3.2 (L) 3.5 - 5.5 mmol/L    Chloride 102 100 - 111 mmol/L    CO2 28 21 - 32 mmol/L    Anion gap 10 3.0 - 18 mmol/L    Glucose 112 (H) 74 - 99 mg/dL    BUN 9 7.0 - 18 MG/DL    Creatinine 0.84 0.6 - 1.3 MG/DL    BUN/Creatinine ratio 11 (L) 12 - 20      GFR est AA >60 >60 ml/min/1.73m2    GFR est non-AA >60 >60 ml/min/1.73m2    Calcium 9.4 8.5 - 10.1 MG/DL    Bilirubin, total 2.5 (H) 0.2 - 1.0 MG/DL    ALT (SGPT) 240 (H) 13 - 56 U/L    AST (SGOT) 95 (H) 10 - 38 U/L    Alk.  phosphatase 78 45 - 117 U/L    Protein, total 8.4 (H) 6.4 - 8.2 g/dL    Albumin 3.4 3.4 - 5.0 g/dL    Globulin 5.0 (H) 2.0 - 4.0 g/dL    A-G Ratio 0.7 (L) 0.8 - 1.7     TROPONIN I    Collection Time: 09/02/21 11:29 AM   Result Value Ref Range    Troponin-I, QT <0.02 0.0 - 0.045 NG/ML   MAGNESIUM    Collection Time: 09/02/21 11:29 AM   Result Value Ref Range    Magnesium 2.4 1.6 - 2.6 mg/dL   LIPASE    Collection Time: 09/02/21 11:29 AM   Result Value Ref Range    Lipase 193 73 - 393 U/L   GLUCOSE, POC    Collection Time: 09/02/21  1:40 PM   Result Value Ref Range    Glucose (POC) 95 70 - 110 mg/dL   TROPONIN I    Collection Time: 09/02/21  3:25 PM   Result Value Ref Range    Troponin-I, QT <0.02 0.0 - 0.045 NG/ML   EKG, 12 LEAD, SUBSEQUENT    Collection Time: 09/02/21  3:25 PM   Result Value Ref Range    Ventricular Rate 82 BPM    Atrial Rate 82 BPM    P-R Interval 118 ms    QRS Duration 134 ms    Q-T Interval 520 ms    QTC Calculation (Bezet) 607 ms    Calculated P Axis 39 degrees    Calculated R Axis -25 degrees    Calculated T Axis -6 degrees    Diagnosis       Normal sinus rhythm  Right bundle branch block  T wave abnormality, consider inferolateral ischemia  Abnormal ECG  When compared with ECG of 02-SEP-2021 10:28,  QT has lengthened     URINALYSIS W/ RFLX MICROSCOPIC    Collection Time: 09/02/21  3:30 PM   Result Value Ref Range    Color DARK YELLOW      Appearance CLOUDY      Specific gravity >1.030 (H) 1.005 - 1.030    pH (UA) 6.5 5.0 - 8.0      Protein TRACE (A) NEG mg/dL    Glucose Negative NEG mg/dL    Ketone 40 (A) NEG mg/dL    Bilirubin MODERATE (A) NEG      Blood Negative NEG      Urobilinogen 2.0 (H) 0.2 - 1.0 EU/dL    Nitrites Negative NEG      Leukocyte Esterase Negative NEG     URINE MICROSCOPIC ONLY    Collection Time: 09/02/21  3:30 PM   Result Value Ref Range    WBC 0 to 1 0 - 5 /hpf    RBC Negative 0 - 5 /hpf    Epithelial cells 4+ 0 - 5 /lpf    Bacteria Negative NEG /hpf       Radiologic Studies -   CTA CHEST W OR W WO CONT   Final Result      1. No convincing CT evidence of pulmonary embolism. 2.  Extensive streaky interstitial opacities throughout bilateral lung fields   which could represent interstitial infiltration, chronic interstitial disease or   atelectasis. Recommend clinical correlation and follow-up CT for interval   change. 3. Mild mediastinal and bilateral hilar adenopathy which could be mild   inflammatory, infectious or reactive process. 4. Mild cardiomegaly. Thank you for your referral.         CT ABD PELV W CONT   Final Result      1. No acute osseous abnormality to explain patient's symptom. No bowel   dilatation, obstruction or inflammation. 2. Hepatic steatosis. 2 hepatic lesions with nodular peripheral enhancement,   likely cavernous hemangiomas. 3. Small cortical cyst in right kidney and adjacent several nonobstructive   calculi. Tiny infarct in the left kidney. 4. Cholelithiasis. Thank you for this referral.       XR CHEST PA LAT   Final Result      Persistent streaky densities noted bilaterally. Findings may represent   atelectasis or infiltrate. US ABD LTD    (Results Pending)         Medical Decision Making   I am the first provider for this patient. I reviewed available nursing notes, past medical history, past surgical history, family history and social history. Vital Signs-Reviewed the patient's vital signs.     Records Reviewed: Nursing Notes and Old Medical Records (Time of Review: 12:11 PM)    Pulse Oximetry Analysis - 95% on RA- normal     EKG: Normal sinus rhythm take rate of 79 bpm.  New right bundle branch block present nonspecific T wave abnormalities. ED Course: Progress Notes, Reevaluation, and Consults:  12:11 PM  Initial assessment performed. The patients presenting problems have been discussed, and they/their family are in agreement with the care plan formulated and outlined with them. I have encouraged them to ask questions as they arise throughout their visit. 4:15PM A 12-lead EKG was obtained with without evidence of acute ischemia or infarction and no evidence of dysrhythmia. We obtained a troponin in the evaluation of this patient, which was noted to be undetectable. Plain films of the chest were obtained showing persistent streaky densities due to Covid but no acute changes. CTA of the chest was done showing no PE and CT of the abdomen showing incidental hepatic steatosis, 2 hepatic lesions, and cholelithiasis. UA show some dehydration with the presence of bilirubin and no evidence of infection. CBC shows no leukocytosis, anemia, shift, or bands. CMP shows mild hypokalemia with potassium of 3.2 which was replaced orally. Mag is normal.  There is significant elevation in the liver enzymes with T bili of 2.5, ALT of 240, and AST 95. Lipase is within normal limits. Will obtain an ultrasound of the right upper quadrant to rule out any biliary obstructive pathology. 6:01 PM : Pt care transferred to [place], [place]- ED provider. History of patient complaint(s), available diagnostic reports and current treatment plan has been discussed thoroughly. Bedside rounding on patient occured : no . Intended disposition of patient : TBD  Pending diagnostics reports and/or labs (please list): US    Provider Notes (Medical Decision Making):     Patient is a 51-year-old female with recent diagnosis of COVID-19 on August 17, 2021.   Since that time she has been taking doxycycline for Covid pneumonia. She states over the last week she has had worsening decreased appetite, nausea, sour taste in the mouth, generalized weakness. She states her cough is completely resolved and her shortness of breath is at baseline. She denies any chest pain. She has no lower extremity pain or swelling. Syncopal episode that happened today did not involve any head trauma. Suspect that her syncope was due to a vasovagal reaction as she had an urgent need to have a bowel movement and ended up having fecal incontinence as a result. Abdomen is soft and nontender although she does describe diffuse subjective pain throughout. No rebound or guarding on exam.  She has no CVAT. Otherwise her neurologic exam is completely nonfocal with no motor or sensory deficits. Vitals are stable and she is afebrile. No signs of sepsis. Will obtain appropriate studies to evaluate patient's complaints and treat symptomatically. Will disposition after reassessment assuming no clinical change or worsening and appropriate response to symptomatic treatment. Diagnosis     Clinical Impression:   1. Syncope and collapse    2. Hypokalemia    3. Elevated liver enzymes    4. Non-intractable vomiting with nausea, unspecified vomiting type        Disposition: pending       Follow-up Information     Follow up With Specialties Details Why Contact Info    Sophie Byrd MD Internal Medicine Schedule an appointment as soon as possible for a visit  Follow-up from the Emergency Department [address] [place] [address] [address] [phone] [facility] EMERGENCY DEPT Emergency Medicine  As needed, If symptoms worsen [address]  [phone]           Current Discharge Medication List            Dictation disclaimer:  Please note that this dictation was completed with [employer], the [employer] voice recognition software.   Quite often unanticipated grammatical, syntax, homophones, and other interpretive errors are inadvertently transcribed by the computer software. Please disregard these errors. Please excuse any errors that have escaped final proofreading.

## 2021-09-03 LAB
ATRIAL RATE: 79 BPM
ATRIAL RATE: 82 BPM
CALCULATED P AXIS, ECG09: 39 DEGREES
CALCULATED P AXIS, ECG09: 46 DEGREES
CALCULATED R AXIS, ECG10: -25 DEGREES
CALCULATED R AXIS, ECG10: -27 DEGREES
CALCULATED T AXIS, ECG11: -6 DEGREES
CALCULATED T AXIS, ECG11: -94 DEGREES
DIAGNOSIS, 93000: NORMAL
DIAGNOSIS, 93000: NORMAL
P-R INTERVAL, ECG05: 118 MS
P-R INTERVAL, ECG05: 134 MS
Q-T INTERVAL, ECG07: 458 MS
Q-T INTERVAL, ECG07: 520 MS
QRS DURATION, ECG06: 134 MS
QRS DURATION, ECG06: 148 MS
QTC CALCULATION (BEZET), ECG08: 525 MS
QTC CALCULATION (BEZET), ECG08: 607 MS
VENTRICULAR RATE, ECG03: 79 BPM
VENTRICULAR RATE, ECG03: 82 BPM

## 2022-03-17 ENCOUNTER — TRANSCRIBE ORDER (OUTPATIENT)
Dept: SCHEDULING | Age: 62
End: 2022-03-17

## 2022-03-17 DIAGNOSIS — Z12.31 VISIT FOR SCREENING MAMMOGRAM: Primary | ICD-10-CM

## 2022-03-18 PROBLEM — Z96.659 FAILED TOTAL KNEE REPLACEMENT (HCC): Status: ACTIVE | Noted: 2019-10-23

## 2022-03-18 PROBLEM — T84.018A FAILED TOTAL KNEE REPLACEMENT (HCC): Status: ACTIVE | Noted: 2019-10-23

## 2022-03-19 PROBLEM — F33.2 MAJOR DEPRESSIVE DISORDER, RECURRENT EPISODE, SEVERE WITH ANXIOUS DISTRESS (HCC): Status: ACTIVE | Noted: 2021-03-12

## 2022-03-19 PROBLEM — M17.11 ARTHRITIS OF KNEE, RIGHT: Status: ACTIVE | Noted: 2018-06-06

## 2022-03-20 PROBLEM — R45.851 SUICIDAL IDEATION: Status: ACTIVE | Noted: 2021-03-12

## 2022-03-24 ENCOUNTER — HOSPITAL ENCOUNTER (OUTPATIENT)
Dept: MAMMOGRAPHY | Age: 62
Discharge: HOME | End: 2022-03-24
Attending: INTERNAL MEDICINE
Payer: MEDICAID

## 2022-03-24 DIAGNOSIS — Z12.31 VISIT FOR SCREENING MAMMOGRAM: ICD-10-CM

## 2022-03-24 PROCEDURE — 77063 BREAST TOMOSYNTHESIS BI: CPT

## 2022-04-01 ENCOUNTER — HOSPITAL ENCOUNTER (OUTPATIENT)
Dept: LAB | Age: 62
Discharge: HOME | End: 2022-04-01

## 2022-04-01 LAB — XX-LABCORP SPECIMEN COL,LCBCF: NORMAL

## 2022-04-01 PROCEDURE — 99001 SPECIMEN HANDLING PT-LAB: CPT

## 2022-05-05 ENCOUNTER — TRANSCRIBE ORDER (OUTPATIENT)
Dept: SCHEDULING | Age: 62
End: 2022-05-05

## 2022-05-05 DIAGNOSIS — R92.8 ABNORMAL MAMMOGRAM: Primary | ICD-10-CM

## 2022-05-16 ENCOUNTER — HOSPITAL ENCOUNTER (OUTPATIENT)
Dept: MAMMOGRAPHY | Age: 62
Discharge: HOME | End: 2022-05-16
Attending: INTERNAL MEDICINE
Payer: MEDICAID

## 2022-05-16 ENCOUNTER — HOSPITAL ENCOUNTER (OUTPATIENT)
Dept: ULTRASOUND IMAGING | Age: 62
Discharge: HOME | End: 2022-05-16
Attending: INTERNAL MEDICINE
Payer: MEDICAID

## 2022-05-16 DIAGNOSIS — R92.8 ABNORMAL MAMMOGRAM: ICD-10-CM

## 2022-05-16 PROCEDURE — 77061 BREAST TOMOSYNTHESIS UNI: CPT

## 2023-04-03 ENCOUNTER — TRANSCRIBE ORDERS (OUTPATIENT)
Facility: HOSPITAL | Age: 63
End: 2023-04-03

## 2023-04-03 DIAGNOSIS — Z12.31 VISIT FOR SCREENING MAMMOGRAM: Primary | ICD-10-CM

## 2023-05-15 ENCOUNTER — HOSPITAL ENCOUNTER (OUTPATIENT)
Facility: HOSPITAL | Age: 63
Discharge: HOME | End: 2023-05-18
Payer: MEDICAID

## 2023-05-15 DIAGNOSIS — Z12.31 VISIT FOR SCREENING MAMMOGRAM: ICD-10-CM

## 2023-05-15 PROCEDURE — 77063 BREAST TOMOSYNTHESIS BI: CPT

## 2023-07-27 ENCOUNTER — TELEPHONE (OUTPATIENT)
Age: 63
End: 2023-07-27

## 2023-07-27 DIAGNOSIS — Z96.651 HISTORY OF TOTAL KNEE REPLACEMENT, RIGHT: ICD-10-CM

## 2023-07-27 DIAGNOSIS — T84.018S FAILURE OF TOTAL KNEE REPLACEMENT, SEQUELA: Primary | ICD-10-CM

## 2023-07-27 DIAGNOSIS — Z96.659 FAILURE OF TOTAL KNEE REPLACEMENT, SEQUELA: Primary | ICD-10-CM

## 2023-07-27 NOTE — TELEPHONE ENCOUNTER
Flaca from [facility] is requesting antibiotic protocol for patient. She had surgeries 2018 & 2019. Please advise Flaca at [phone], her fax # is [phone].

## 2023-07-31 NOTE — TELEPHONE ENCOUNTER
Patient called to follow up on antibiotic request below - she has her dental appointment scheduled for tomorrow and [facility] [place] 500 mg Amoxicillan but she was told we are prescribing a different medication and she's not sure if she needs both. Prefers [facility] [address] for any prescriptions.

## 2023-08-01 NOTE — TELEPHONE ENCOUNTER
Patient called back regarding previous messages. Her dentist appt is today at noon. She also had questions on how to take the medication. Please advise & contact her back at [phone] as soon as possible.

## 2023-08-16 RX ORDER — AMOXICILLIN 500 MG/1
CAPSULE ORAL
Qty: 4 CAPSULE | Refills: 3 | Status: SHIPPED | OUTPATIENT
Start: 2023-08-16

## 2023-08-16 NOTE — TELEPHONE ENCOUNTER
Patient is still waiting on a phone call back and medication to be sent to the pharmacy       Please call and advise patient   [phone]

## 2024-02-20 NOTE — BH NOTES
Patient appeared to have slept 6 hrs. Muscle Strain WHAT YOU NEED TO KNOW:    A muscle strain is a twist, pull, or tear of a muscle or tendon. A tendon is a strong elastic tissue that connects a muscle to a bone. Signs of a strained muscle include bruising and swelling over the area, pain with movement, and loss of strength.    DISCHARGE INSTRUCTIONS:    Seek care immediately or call 911 if:    You suddenly cannot feel or move your injured muscle.    Contact your healthcare provider if:    Your pain and swelling worsen or do not go away.    You have questions or concerns about your condition or care.  Medicines:    NSAIDs, such as ibuprofen, help decrease swelling, pain, and fever. This medicine is available with or without a doctor's order. NSAIDs can cause stomach bleeding or kidney problems in certain people. If you take blood thinner medicine, always ask your healthcare provider if NSAIDs are safe for you. Always read the medicine label and follow directions.    Muscle relaxers help decrease pain and muscle spasms.    Take your medicine as directed. Contact your healthcare provider if you think your medicine is not helping or if you have side effects. Tell him or her if you are allergic to any medicine. Keep a list of the medicines, vitamins, and herbs you take. Include the amounts, and when and why you take them. Bring the list or the pill bottles to follow-up visits. Carry your medicine list with you in case of an emergency.  Follow up with your healthcare provider as directed: Your healthcare provider may suggest that you have a follow-up visit before you go back to your usual activity. Write down your questions so you remember to ask them during your visits.    Self-care:    3 to 7 days after the injury: Use Rest, Ice, Compression, and Elevation (RICE) to help stop bruising and decrease pain and swelling.  Rest: Rest your muscle to allow your injury to heal. When the pain decreases, begin normal, slow movements. For mild and moderate muscle strains, you should rest your muscles for about 2 days. However, if you have a severe muscle strain, you should rest for 10 to 14 days. You may need to use crutches to walk if your muscle strain is in your legs or lower body.    Ice: Put an ice pack on the injured area. Put a towel between the ice pack and your skin. Do not put the ice pack directly on your skin. You can use a package of frozen peas instead of an ice pack.    Compression: You may need to wrap an elastic bandage around the area to decrease swelling. It should be tight enough for you to feel support. Do not wrap it too tightly.    Elevation: Keep the injured muscle raised above your heart if possible. For example, if you have a strain of your lower leg muscle, lie down and prop your leg up on pillows. This helps decrease pain and swelling.    3 to 21 days after the injury: Start to slowly and regularly exercise your strained muscle. This will help it heal. If you feel pain, decrease how hard you are exercising.    1 to 6 weeks after the injury: Stretch the injured muscle. Hold the stretch for about 30 seconds. Do this 4 times a day. You may stretch the muscle until you feel a slight pull. Stop stretching if you feel pain.    2 weeks to 6 months after the injury: The goal of this phase is to return to the activity you were doing before the injury happened, without hurting the muscle again.    3 weeks to 6 months after the injury: Keep stretching and strengthening your muscles to avoid injury. Slowly increase the time and distance that you exercise. You may still have signs and symptoms of muscle strain 6 months after the injury, even if you do things to help it heal. In this case, you may need surgery on the muscle.  Prevent muscle strains:    Always wear proper shoes when you play sports: Replace your old running shoes with new ones often if you are a runner. Use special shoe inserts or arch supports to correct leg or foot problems. Ask your healthcare provider for more information on shoe supports.    Do warm up and cool down exercises: Do stretching exercises before you work out or do sports activities. These exercises will help loosen and decrease stress on your muscles. Cool down and stretch after your workout. Do not stop and rest after a workout without cooling down first.    Keep your muscles strong with strength training exercises: Exercises such as weight lifting and stretching exercises help keep your muscles flexible and strong. A physical therapist or  may help you with these exercises.    Slowly start your exercise or sports training program: Follow your healthcare provider's advice on when to start exercising. Slowly increase time, distance, and how often you train. Sudden increases in how often you train may cause you to injure your muscle again.    Distensión muscular LO QUE NECESITA SABER:    Radha distensión muscular es radha torcedura, tirón o desgarre de un músculo o tendón. Un tendón es un tejido elástico jason que conecta un músculo a un hueso. Los signos de un músculo distendido incluyen moretones e inflamación en el área, dolor con el movimiento y pérdida de la fuerza:    INSTRUCCIONES SOBRE EL BRITNI HOSPITALARIA:    Busque atención médica de inmediato o llame al 911 si:    Usted repentinamente no siente o no puede  el músculo lesionado.    Comuníquese con lr médico si:    Lr dolor o inflamación empeoran o no desaparecen.    Usted tiene preguntas o inquietudes acerca de lr condición o cuidado.  Medicamentos:    Los AMELIE,wan el ibuprofeno, ayudan a disminuir la inflamación, el dolor y la fiebre. Ela medicamento está disponible con o sin radha receta médica. Los AMELIE pueden causar sangrado estomacal o problemas renales en ciertas personas. Si usted oziel un medicamento anticoagulante, siempre pregúntele a lr médico si los AMELIE son seguros para usted. Siempre liberty la etiqueta de ela medicamento y siga las instrucciones.    Relajantes muscularesayudan a reducir dolor y espasmos musculares.    [place] alyse medicamentos wan se le haya indicado.Consulte con lr médico si usted teri que lr medicamento no le está ayudando o si presenta efectos secundarios. Infórmele si es alérgico a cualquier medicamento. Mantenga radha lista actualizada de los medicamentos, las vitaminas y los productos herbales que oziel. Incluya los siguientes datos de los medicamentos: cantidad, frecuencia y motivo de administración. Traiga con usted la lista o los envases de las píldoras a alyse citas de seguimiento. Lleve la lista de los medicamentos con usted en jorge de radha emergencia.  Acuda a alyse consultas de control con lr médico según le indicaron.Lr médico podría sugerirle que programe radha sandeep antes de regresar a alyse actividades normales. Anote alyse preguntas para que se acuerde de hacerlas marcos alyse visitas.    Cuidados personales:    De 3 a 7 días después de la lesión:Utilice [place], Hielo, Compresión y Elevación (DHCE) para ayudar a detener los moretones y disminuir el dolor y la inflamación.  Descanse:Descanse el músculo para permitir que la lesión sane. Cuando disminuya el dolor, comience a realizar movimientos normales y lentos. Para distensiones musculares leves y moderadas, usted debe descansar los músculos por lo menos 2 días. Sin embargo, si usted tiene radha distensión muscular severa, el descanso debe ser de 10 a 14 ana. Es posible que usted necesite muletas para caminar si la distensión muscular está en las piernas o en la parte inferior del cuerpo.    Hielo:Coloque radha bolsa de hielo en el área lesionada. Coloque radha toalla entre el paquete de hielo y la piel. No coloque la bolsa de hielo directamente sobre la piel. Usted puede usar un paquete de chícharos congelados en vez de usar radha bolsa de hielo.    Compresión:Es posible que usted necesite envolver un vendaje elástico alrededor del área para reducir la inflamación. Estas deben estar lo suficientemente apretadas wan para sentir sostén. No lo apriete demasiado.    Elevación:Mantenga el músculo lesionado arriba del nivel del corazón si es posible. Por ejemplo, si usted tiene radha distensión en la parte inferior de la pierna, acuéstese y apoye la pierna sobre almohadas. [place] ayuda a disminuir el dolor y la inflamación.    De 3 a 21 días después de la lesión:Comience lenta y regularmente a ejercitar el músculo con la distensión. [place] ayudará a que sane. Si siente dolor, disminuya la fuerza del ejercicio.    De 1 a 6 semanas después de la lesión:Estire el músculo lesionado. Estírelo alrededor de 30 segundos. Lissette esto 4 veces al día. Usted puede estirar el músculo hasta que sienta un jalón pequeño. Suspenda el estiramiento del músculo si siente dolor.    De 2 semanas a 6 meses después de la lesión:La meta de esta fase es que usted regrese a la actividad que estaba haciendo antes que ocurriera la lesión sin lastimar el músculo.    De 3 semanas a 6 meses después de la lesión:Continúe estirando y fortaleciendo alyse músculos para evitar radha lesión. Aumente el tiempo y la distancia del ejercicio lentamente. Usted aún podría tener signos y síntomas de distensión muscular 6 meses después de la lesión, incluso si usted realiza actividades para ayudar a sanarla. En ela jorge, es posible que usted necesite cirugía en el músculo.  Evite radha distensión muscular:    Siempre use el calzado apropiado cuando practique deportes: Reemplace frecuentemente alyse zapatos viejos de correr por unos nuevos si usted es un link. Use unas plantillas o soportes del arco para el calzado para corregir problemas con la pierna o el pie. Consulte con lr médico para más información sobre soportes de calzado.    Lissette ejercicios de calentamiento y enfriamiento: Lissette ejercicios de estiramiento antes de hacer ejercicio o actividades deportivas. Estos ejercicios ayudan a soltar y disminuir el estrés en alyse músculos. Después de alyse ejercicios, enfríe alyse músculos y estírese. No suspenda el ejercicio y descanse después sin haberse enfriado antes.    Mantenga los músculos neal al entrenar con ejercicios de fuerza: Ejercicios wan levantar pesas y de estiramiento ayudan a mantener alyse músculos flexibles y neal. Un fisioterapeuta o un entrenador podrían ayudarlo con estos ejercicios.    Comience un programa de entrenamiento de ejercicios o de deportes lentamente: Siga el consejo de lr médico de cuándo empezar a ejercitarse. Aumente el tiempo, la distancia y la frecuencia de lr entrenamiento poco a poco. Aumentar la frecuencia del ejercicio repentinamente podría provocarle radha lesión muscular nuevamente.

## 2024-05-06 ENCOUNTER — TRANSCRIBE ORDERS (OUTPATIENT)
Facility: HOSPITAL | Age: 64
End: 2024-05-06

## 2024-05-06 DIAGNOSIS — Z12.31 VISIT FOR SCREENING MAMMOGRAM: Primary | ICD-10-CM

## 2024-05-22 ENCOUNTER — HOSPITAL ENCOUNTER (OUTPATIENT)
Facility: HOSPITAL | Age: 64
Discharge: HOME | End: 2024-05-25
Payer: MEDICARE

## 2024-05-22 VITALS — BODY MASS INDEX: 29.89 KG/M2 | WEIGHT: 186 LBS | HEIGHT: 66 IN

## 2024-05-22 DIAGNOSIS — Z12.31 VISIT FOR SCREENING MAMMOGRAM: ICD-10-CM

## 2024-05-22 PROCEDURE — 77063 BREAST TOMOSYNTHESIS BI: CPT

## 2024-07-03 ENCOUNTER — HOSPITAL ENCOUNTER (OUTPATIENT)
Facility: HOSPITAL | Age: 64
Discharge: HOME | End: 2024-07-06
Payer: MEDICARE

## 2024-07-03 DIAGNOSIS — R92.8 ABNORMAL MAMMOGRAM: ICD-10-CM

## 2024-07-03 DIAGNOSIS — N64.89 BREAST ASYMMETRY: ICD-10-CM

## 2024-07-03 PROCEDURE — G0279 TOMOSYNTHESIS, MAMMO: HCPCS

## 2025-03-09 NOTE — PROGRESS NOTES
PT DAILY TREATMENT NOTE     Patient Name: Lesley Staples  Date:2018  : 1960  [x]  Patient  Verified  Payor: Remedios Shook / Plan[place] / Product Type: PPO /    In time:830  Out time:9:16  Total Treatment Time (min): 55  Visit #: 9 of 12    Treatment Area: Right knee pain [M25.561]    SUBJECTIVE  Pain Level (0-10 scale): 3/10  Any medication changes, allergies to medications, adverse drug reactions, diagnosis change, or new procedure performed?: [x] No    [] Yes (see summary sheet for update)  Subjective functional status/changes:   [x] No changes reported    OBJECTIVE  Modality rationale: decrease edema, decrease inflammation and decrease pain to improve the patients ability to improve ADL ease. Min Type Additional Details   10 [x]  Vasopneumatic Device Pressure:       [x] lo [] med [] hi   Temperature: [x] lo [] med [] hi   [] Skin assessment post-treatment:  []intact []redness- no adverse reaction    []redness - adverse reaction:     28 min Therapeutic Exercise:  [x] See flow sheet :   Rationale: increase ROM and increase strength to improve the patients ability to improve ADL ease. 8 min Manual Therapy:  PROM - scar massage, patellar mobs    Rationale: decrease pain, increase ROM and increase tissue extensibility to improve ADL ease. With   [] TE   [] TA   [] neuro   [] other: Patient Education: [x] Review HEP    [] Progressed/Changed HEP based on:   [] positioning   [] body mechanics   [] transfers   [] heat/ice application    [] other:      Other Objective/Functional Measures:     Pain Level (0-10 scale) post treatment: 3/10    ASSESSMENT/Changes in Function: pt has met most goals and ready for discharge. []  See Plan of Care  []  See progress note/recertification  [x]  See Discharge Summary         Progress towards goals / Updated goals:  Short Term Goals: To be accomplished in 2 weeks:                         3.  The patient will be independent with HEP to maximize therapeutic benefit.met per patient report (6/28/2018)                         2. The patient will improve right knee extension to 0 degrees to maximize ambulation efficiency. met (7/2/2018)  Long Term Goals: To be accomplished in 4 weeks:                         1. The patient will improve FOTO score to 60 to maximize ADL ease. - FOTO 63%. 7/6/2018                         2. The patient will improve right knee flexion to 120 degrees to maximize ease of ambulation efficiency. - Met, 122 degrees. 7/13/2018                         3. The patient will maximize strength of right quad to 5/5 MMT to maximize stability during ambulation. -met (7/16/2018)                         4. The patient will ambulate void of AD without compensations to maximize ambulation efficiency.  Slight antalgia appreciated, near [facility] 7/18/2018    PLAN  []  Upgrade activities as tolerated     []  Continue plan of care  []  Update interventions per flow sheet       [x]  Discharge due to:_  []  Other:_      Flavia Graf, ANNALISA 7/19/2018  9:39 AM    Future Appointments  Date Time Provider Jean Paul Louis   7/19/2018 9:45 AM MARY Ochoa Pasha 69 No

## (undated) DEVICE — PAD,ABDOMINAL,8"X10",ST,LF: Brand: MEDLINE

## (undated) DEVICE — NEEDLE SPNL 22GA L3.5IN BLK HUB S STL REG WALL FIT STYL W/

## (undated) DEVICE — GOWN,REINFORCED,POLY,AURORA,XXLARGE,STR: Brand: MEDLINE

## (undated) DEVICE — PACK SURG BSHR TOT KNEE LF

## (undated) DEVICE — (D)PACK ICE DISP -- DISC BY MFR

## (undated) DEVICE — 3M™ BAIR PAWS FLEX™ WARMING GOWN, STANDARD, 20 PER CASE 81003: Brand: BAIR PAWS™

## (undated) DEVICE — JP 3-SPRING RES W/15FR PVC DRAIN/TR: Brand: CARDINAL HEALTH

## (undated) DEVICE — 3M™ TEGADERM™ TRANSPARENT FILM DRESSING FRAME STYLE, 1626W, 4 IN X 4-3/4 IN (10 CM X 12 CM), 50/CT 4CT/CASE: Brand: 3M™ TEGADERM™

## (undated) DEVICE — BIPOLAR SEALER 23-112-1 AQM 6.0: Brand: AQUAMANTYS ®

## (undated) DEVICE — SUTURE VCRL SZ 3-0 L27IN ABSRB UD L36MM CT-1 1/2 CIR J258H

## (undated) DEVICE — INTENDED FOR TISSUE SEPARATION, AND OTHER PROCEDURES THAT REQUIRE A SHARP SURGICAL BLADE TO PUNCTURE OR CUT.: Brand: BARD-PARKER ®  SAFETY SCALPED

## (undated) DEVICE — DISPOSABLE TOURNIQUET CUFF SINGLE BLADDER, DUAL PORT AND QUICK CONNECT CONNECTOR: Brand: COLOR CUFF

## (undated) DEVICE — Z DISCONTINUED USE 2744636  DRESSING AQUACEL 14 IN ALG W3.5XL14IN POLYUR FLM CVR W/ HYDRCOLL

## (undated) DEVICE — BLANKET WRM AD W50XL85.8IN PACU FULL BODY FORC AIR

## (undated) DEVICE — BUR SURG 10 FLUT 10 MM RND CARBIDE UPWR

## (undated) DEVICE — DRSG PATCH ANTIMIC 1INX4.0MM -- CONVERT TO ITEM 356053

## (undated) DEVICE — SHEET, DRAPE, SPLIT, STERILE: Brand: MEDLINE

## (undated) DEVICE — ZIMMER® STERILE DISPOSABLE TOURNIQUET CUFF WITH PROTECTIVE SLEEVE AND PLC, SINGLE PORT, SINGLE BLADDER, 24 IN. (61 CM)

## (undated) DEVICE — 4-PORT MANIFOLD: Brand: NEPTUNE 2

## (undated) DEVICE — SOFT SILICONE HYDROCELLULAR SACRUM DRESSING WITH LOCK AWAY LAYER: Brand: ALLEVYN LIFE SACRUM (LARGE) PACK OF 10

## (undated) DEVICE — T5 HOOD WITH PEEL AWAY FACE SHIELD

## (undated) DEVICE — FLEX ADVANTAGE 3000CC: Brand: FLEX ADVANTAGE

## (undated) DEVICE — SUTURE VCRL SZ 2 L27IN ABSRB VLT L65MM TP-1 1/2 CIR J649G

## (undated) DEVICE — LIGHT HANDLE: Brand: DEVON

## (undated) DEVICE — X-RAY SPONGES,12 PLY: Brand: DERMACEA

## (undated) DEVICE — NEEDLE HYPO 21GA L1.5IN INTRAMUSCULAR S STL LATCH BVL UP

## (undated) DEVICE — COVER LT HNDL BLU STRL -- MEDICHOICE

## (undated) DEVICE — BUR SURG DIA2MM 6MM FLUT LIME S STL CARB RND UPWR

## (undated) DEVICE — INTENDED FOR TISSUE SEPARATION, AND OTHER PROCEDURES THAT REQUIRE A SHARP SURGICAL BLADE TO PUNCTURE OR CUT.: Brand: BARD-PARKER SAFETY BLADES SIZE 10, STERILE

## (undated) DEVICE — SOLUTION IV 1000ML 0.9% SOD CHL

## (undated) DEVICE — BANDAGE COMPR W6INXL3YD EXSANGUATION 1 PLY ESMARCH

## (undated) DEVICE — Device

## (undated) DEVICE — SKIN MARKER,REGULAR TIP WITH RULER AND LABELS: Brand: DEVON

## (undated) DEVICE — PADDING CAST SOF-ROL 6INX4YD --

## (undated) DEVICE — PADDING CAST W6INXL4YD ST COT COHESIVE HND TEARABLE SPEC

## (undated) DEVICE — KIT CLN UP BON SECOURS MARYV

## (undated) DEVICE — SYRINGE NDL 23GA 3ML L1IN TURQ PLAS NDL S STL SHLD HYPO BVL

## (undated) DEVICE — 2108 SERIES SAGITTAL BLADE (24.8 X 1.24 X 80.1MM)

## (undated) DEVICE — SUTURE MCRYL SZ 2-0 L36IN ABSRB UD L36MM CT-1 1/2 CIR Y945H

## (undated) DEVICE — GARMENT COMPR L FOR 13-16IN FT INTMIT SGL BLDR HEM FORC II

## (undated) DEVICE — REM POLYHESIVE ADULT PATIENT RETURN ELECTRODE: Brand: VALLEYLAB

## (undated) DEVICE — SLIM BODY SKIN STAPLER: Brand: APPOSE ULC

## (undated) DEVICE — BLADE RMFG DBL RECIP DBL SD --

## (undated) DEVICE — SYR LR LCK 1ML GRAD NSAF 30ML --

## (undated) DEVICE — STRYKER PERFORMANCE SERIES SAGITTAL BLADE: Brand: STRYKER PERFORMANCE SERIES

## (undated) DEVICE — AIRLIFE™ ADULT CUSHION NASAL CANNULA 14 FOOT (4.3) CRUSH-RESISTANT OXYGEN TUBING, AND U/CONNECT-IT ADAPTER: Brand: AIRLIFE™

## (undated) DEVICE — HANDPIECE SET WITH HIGH FLOW TIP AND SUCTION TUBE: Brand: INTERPULSE

## (undated) DEVICE — WATERPROOF, BACTERIA PROOF DRESSING WITH ABSORBENT SEE THROUGH PAD: Brand: OPSITE POST-OP VISIBLE 30X10CM CTN 20

## (undated) DEVICE — PREP SKN CHLRAPRP 26ML TNT -- CONVERT TO ITEM 373320

## (undated) DEVICE — STOCKING COMPR L L29-31IN REG 19MMHG ANK 9-10IN CALF

## (undated) DEVICE — SPIROMETER INCENT 2500ML W ONE W VLV

## (undated) DEVICE — STOCKING ANTIMBLSM KNEE XL REG --

## (undated) DEVICE — UNIT THER SEMI CLS LOOP RECIRC SYS W/ UNIV WRP ON PD ICEMAN

## (undated) DEVICE — Z DISCONTINUED BY MEDLINE USE 2711682 TRAY SKIN PREP DRY W/ PREM GLV

## (undated) DEVICE — IMMOBILIZER KNEE PREMIER PRO TRI PNL 24INCH FOAM TIETEX PAT

## (undated) DEVICE — SOLUTION IRRIG 3000ML LAC R FLX CONT

## (undated) DEVICE — SUTURE VCRL SZ 1 L27IN ABSRB VLT CTX L48MM 1 2 CIR SGL ARMED J365H

## (undated) DEVICE — SUTURE VCRL SZ 0 L27IN ABSRB UD L36MM CT-1 1/2 CIR J260H

## (undated) DEVICE — (D)PREP SKN CHLRAPRP APPL 26ML -- CONVERT TO ITEM 371833

## (undated) DEVICE — 3M™ STERI-DRAPE™ INSTRUMENT POUCH 1018: Brand: STERI-DRAPE™

## (undated) DEVICE — ELASTIC BANDAGE 6": Brand: CARDINAL HEALTH

## (undated) DEVICE — SMARTSLEEVE SURGICAL GOWN, 3XL LONG: Brand: CONVERTORS

## (undated) DEVICE — GUIDEPIN ORTH THRD HI PERF HD SIG

## (undated) DEVICE — PIN DRL QUIK HI PERF FOR SIG SYS

## (undated) DEVICE — NEEDLE HYPO 18GA L1.5IN PNK S STL HUB POLYPR SHLD REG BVL